# Patient Record
Sex: MALE | Race: WHITE | Employment: OTHER | URBAN - METROPOLITAN AREA
[De-identification: names, ages, dates, MRNs, and addresses within clinical notes are randomized per-mention and may not be internally consistent; named-entity substitution may affect disease eponyms.]

---

## 2017-04-07 ENCOUNTER — HOSPITAL ENCOUNTER (OUTPATIENT)
Facility: CLINIC | Age: 72
End: 2017-04-07
Attending: OPHTHALMOLOGY | Admitting: OPHTHALMOLOGY
Payer: MEDICARE

## 2017-04-18 RX ORDER — ATENOLOL AND CHLORTHALIDONE TABLET 100; 25 MG/1; MG/1
1 TABLET ORAL EVERY EVENING
COMMUNITY
End: 2020-01-22

## 2017-04-19 ENCOUNTER — SURGERY (OUTPATIENT)
Age: 72
End: 2017-04-19

## 2017-04-19 ENCOUNTER — APPOINTMENT (OUTPATIENT)
Dept: ADMISSION | Facility: CLINIC | Age: 72
End: 2017-04-19
Attending: OPHTHALMOLOGY
Payer: COMMERCIAL

## 2017-04-19 ENCOUNTER — HOSPITAL ENCOUNTER (OUTPATIENT)
Facility: CLINIC | Age: 72
Discharge: HOME | End: 2017-04-19
Attending: OPHTHALMOLOGY | Admitting: OPHTHALMOLOGY
Payer: MEDICARE

## 2017-04-19 ENCOUNTER — ANESTHESIA (OUTPATIENT)
Dept: SURGERY | Facility: CLINIC | Age: 72
End: 2017-04-19
Payer: MEDICARE

## 2017-04-19 ENCOUNTER — ANESTHESIA EVENT (OUTPATIENT)
Dept: SURGERY | Facility: CLINIC | Age: 72
End: 2017-04-19
Payer: MEDICARE

## 2017-04-19 VITALS
TEMPERATURE: 97.1 F | OXYGEN SATURATION: 97 % | WEIGHT: 221 LBS | DIASTOLIC BLOOD PRESSURE: 95 MMHG | SYSTOLIC BLOOD PRESSURE: 139 MMHG | RESPIRATION RATE: 16 BRPM | BODY MASS INDEX: 32.73 KG/M2 | HEIGHT: 69 IN

## 2017-04-19 LAB — GLUCOSE BLDC GLUCOMTR-MCNC: 116 MG/DL (ref 70–99)

## 2017-04-19 PROCEDURE — 25000125 ZZHC RX 250: Performed by: OPHTHALMOLOGY

## 2017-04-19 PROCEDURE — 36000135 ZZH KERATOTOMY ARCUATE W FEMTOSECOND LASER/IMAGING FOR ATIOL: Performed by: OPHTHALMOLOGY

## 2017-04-19 PROCEDURE — 25000128 H RX IP 250 OP 636: Performed by: NURSE ANESTHETIST, CERTIFIED REGISTERED

## 2017-04-19 PROCEDURE — 25000125 ZZHC RX 250: Performed by: NURSE ANESTHETIST, CERTIFIED REGISTERED

## 2017-04-19 PROCEDURE — 25000128 H RX IP 250 OP 636: Performed by: OPHTHALMOLOGY

## 2017-04-19 PROCEDURE — 36000101 ZZH EYE SURGERY LEVEL 3 1ST 30 MIN: Performed by: OPHTHALMOLOGY

## 2017-04-19 PROCEDURE — 71000028 ZZH EYE RECOVERY PHASE 2 EACH 15 MINS: Performed by: OPHTHALMOLOGY

## 2017-04-19 PROCEDURE — 25800025 ZZH RX 258: Performed by: ANESTHESIOLOGY

## 2017-04-19 PROCEDURE — 40000170 ZZH STATISTIC PRE-PROCEDURE ASSESSMENT II: Performed by: OPHTHALMOLOGY

## 2017-04-19 PROCEDURE — 27210794 ZZH OR GENERAL SUPPLY STERILE: Performed by: OPHTHALMOLOGY

## 2017-04-19 PROCEDURE — 37000008 ZZH ANESTHESIA TECHNICAL FEE, 1ST 30 MIN: Performed by: OPHTHALMOLOGY

## 2017-04-19 PROCEDURE — 25000132 ZZH RX MED GY IP 250 OP 250 PS 637: Mod: GY | Performed by: OPHTHALMOLOGY

## 2017-04-19 PROCEDURE — V2632 POST CHMBR INTRAOCULAR LENS: HCPCS | Performed by: OPHTHALMOLOGY

## 2017-04-19 PROCEDURE — 82962 GLUCOSE BLOOD TEST: CPT

## 2017-04-19 DEVICE — EYE IMP IOL AMO PCL TECNIS ZCB00 22.0: Type: IMPLANTABLE DEVICE | Site: EYE | Status: FUNCTIONAL

## 2017-04-19 RX ORDER — PROPARACAINE HYDROCHLORIDE 5 MG/ML
1 SOLUTION/ DROPS OPHTHALMIC ONCE
Status: COMPLETED | OUTPATIENT
Start: 2017-04-19 | End: 2017-04-19

## 2017-04-19 RX ORDER — ONDANSETRON 2 MG/ML
INJECTION INTRAMUSCULAR; INTRAVENOUS PRN
Status: DISCONTINUED | OUTPATIENT
Start: 2017-04-19 | End: 2017-04-19

## 2017-04-19 RX ORDER — LIDOCAINE HYDROCHLORIDE 10 MG/ML
INJECTION, SOLUTION EPIDURAL; INFILTRATION; INTRACAUDAL; PERINEURAL PRN
Status: DISCONTINUED | OUTPATIENT
Start: 2017-04-19 | End: 2017-04-19 | Stop reason: HOSPADM

## 2017-04-19 RX ORDER — BALANCED SALT SOLUTION 6.4; .75; .48; .3; 3.9; 1.7 MG/ML; MG/ML; MG/ML; MG/ML; MG/ML; MG/ML
SOLUTION OPHTHALMIC PRN
Status: DISCONTINUED | OUTPATIENT
Start: 2017-04-19 | End: 2017-04-19 | Stop reason: HOSPADM

## 2017-04-19 RX ORDER — PHENYLEPHRINE HYDROCHLORIDE 25 MG/ML
1 SOLUTION/ DROPS OPHTHALMIC
Status: COMPLETED | OUTPATIENT
Start: 2017-04-19 | End: 2017-04-19

## 2017-04-19 RX ORDER — MOXIFLOXACIN 5 MG/ML
SOLUTION/ DROPS OPHTHALMIC PRN
Status: DISCONTINUED | OUTPATIENT
Start: 2017-04-19 | End: 2017-04-19 | Stop reason: HOSPADM

## 2017-04-19 RX ORDER — TROPICAMIDE 10 MG/ML
1 SOLUTION/ DROPS OPHTHALMIC
Status: COMPLETED | OUTPATIENT
Start: 2017-04-19 | End: 2017-04-19

## 2017-04-19 RX ORDER — PREDNISOLONE ACETATE 1 %
SUSPENSION, DROPS(FINAL DOSAGE FORM)(ML) OPHTHALMIC (EYE) PRN
Status: DISCONTINUED | OUTPATIENT
Start: 2017-04-19 | End: 2017-04-19 | Stop reason: HOSPADM

## 2017-04-19 RX ORDER — SODIUM CHLORIDE, SODIUM LACTATE, POTASSIUM CHLORIDE, CALCIUM CHLORIDE 600; 310; 30; 20 MG/100ML; MG/100ML; MG/100ML; MG/100ML
500 INJECTION, SOLUTION INTRAVENOUS CONTINUOUS
Status: DISCONTINUED | OUTPATIENT
Start: 2017-04-19 | End: 2017-04-19 | Stop reason: HOSPADM

## 2017-04-19 RX ORDER — PROPARACAINE HYDROCHLORIDE 5 MG/ML
SOLUTION/ DROPS OPHTHALMIC PRN
Status: DISCONTINUED | OUTPATIENT
Start: 2017-04-19 | End: 2017-04-19 | Stop reason: HOSPADM

## 2017-04-19 RX ORDER — DICLOFENAC SODIUM 1 MG/ML
1 SOLUTION/ DROPS OPHTHALMIC
Status: COMPLETED | OUTPATIENT
Start: 2017-04-19 | End: 2017-04-19

## 2017-04-19 RX ORDER — LIDOCAINE 40 MG/G
CREAM TOPICAL
Status: DISCONTINUED | OUTPATIENT
Start: 2017-04-19 | End: 2017-04-19 | Stop reason: HOSPADM

## 2017-04-19 RX ORDER — MOXIFLOXACIN 5 MG/ML
1 SOLUTION/ DROPS OPHTHALMIC
Status: COMPLETED | OUTPATIENT
Start: 2017-04-19 | End: 2017-04-19

## 2017-04-19 RX ADMIN — ONDANSETRON 4 MG: 2 INJECTION INTRAMUSCULAR; INTRAVENOUS at 08:06

## 2017-04-19 RX ADMIN — MOXIFLOXACIN HYDROCHLORIDE 1 DROP: 5 SOLUTION/ DROPS OPHTHALMIC at 07:09

## 2017-04-19 RX ADMIN — EPINEPHRINE 500 ML: 1 INJECTION, SOLUTION, CONCENTRATE INTRAVENOUS at 08:10

## 2017-04-19 RX ADMIN — PHENYLEPHRINE HYDROCHLORIDE 1 DROP: 2.5 SOLUTION/ DROPS OPHTHALMIC at 07:09

## 2017-04-19 RX ADMIN — DICLOFENAC SODIUM 1 DROP: 1 SOLUTION/ DROPS OPHTHALMIC at 06:50

## 2017-04-19 RX ADMIN — BALANCED SALT SOLUTION 15 ML: 6.4; .75; .48; .3; 3.9; 1.7 SOLUTION OPHTHALMIC at 07:45

## 2017-04-19 RX ADMIN — SODIUM CHLORIDE, SODIUM LACTATE, POTASSIUM CHLORIDE, CALCIUM CHLORIDE: 600; 310; 30; 20 INJECTION, SOLUTION INTRAVENOUS at 07:57

## 2017-04-19 RX ADMIN — PHENYLEPHRINE HYDROCHLORIDE 1 DROP: 2.5 SOLUTION/ DROPS OPHTHALMIC at 06:49

## 2017-04-19 RX ADMIN — MIDAZOLAM HYDROCHLORIDE 1 MG: 1 INJECTION, SOLUTION INTRAMUSCULAR; INTRAVENOUS at 07:57

## 2017-04-19 RX ADMIN — LIDOCAINE HYDROCHLORIDE 1 ML: 10 INJECTION, SOLUTION EPIDURAL; INFILTRATION; INTRACAUDAL; PERINEURAL at 08:11

## 2017-04-19 RX ADMIN — MOXIFLOXACIN HYDROCHLORIDE 1 DROP: 5 SOLUTION/ DROPS OPHTHALMIC at 07:01

## 2017-04-19 RX ADMIN — LIDOCAINE HYDROCHLORIDE 0.5 ML: 35 GEL OPHTHALMIC at 07:52

## 2017-04-19 RX ADMIN — TROPICAMIDE 1 DROP: 10 SOLUTION/ DROPS OPHTHALMIC at 06:50

## 2017-04-19 RX ADMIN — Medication 1 ML: at 08:10

## 2017-04-19 RX ADMIN — PROPARACAINE HYDROCHLORIDE 2 DROP: 5 SOLUTION/ DROPS OPHTHALMIC at 07:45

## 2017-04-19 RX ADMIN — TROPICAMIDE 1 DROP: 10 SOLUTION/ DROPS OPHTHALMIC at 07:01

## 2017-04-19 RX ADMIN — PHENYLEPHRINE HYDROCHLORIDE 1 DROP: 2.5 SOLUTION/ DROPS OPHTHALMIC at 07:01

## 2017-04-19 RX ADMIN — MIDAZOLAM HYDROCHLORIDE 1 MG: 1 INJECTION, SOLUTION INTRAMUSCULAR; INTRAVENOUS at 08:07

## 2017-04-19 RX ADMIN — MOXIFLOXACIN HYDROCHLORIDE 1 DROP: 5 SOLUTION/ DROPS OPHTHALMIC at 06:50

## 2017-04-19 RX ADMIN — DICLOFENAC SODIUM 1 DROP: 1 SOLUTION/ DROPS OPHTHALMIC at 07:01

## 2017-04-19 RX ADMIN — MOXIFLOXACIN HYDROCHLORIDE 1 DROP: 5 SOLUTION/ DROPS OPHTHALMIC at 08:11

## 2017-04-19 RX ADMIN — PROPARACAINE HYDROCHLORIDE 1 DROP: 5 SOLUTION/ DROPS OPHTHALMIC at 06:50

## 2017-04-19 RX ADMIN — DICLOFENAC SODIUM 1 DROP: 1 SOLUTION/ DROPS OPHTHALMIC at 07:09

## 2017-04-19 RX ADMIN — TROPICAMIDE 1 DROP: 10 SOLUTION/ DROPS OPHTHALMIC at 07:09

## 2017-04-19 RX ADMIN — BALANCED SALT SOLUTION 15 ML: 6.4; .75; .48; .3; 3.9; 1.7 SOLUTION OPHTHALMIC at 08:10

## 2017-04-19 RX ADMIN — Medication 2 DROP: at 08:11

## 2017-04-19 RX ADMIN — LIDOCAINE HYDROCHLORIDE 0.5 ML: 35 GEL OPHTHALMIC at 08:10

## 2017-04-19 RX ADMIN — DEXMEDETOMIDINE 8 MCG: 100 INJECTION, SOLUTION, CONCENTRATE INTRAVENOUS at 08:00

## 2017-04-19 ASSESSMENT — ENCOUNTER SYMPTOMS
SEIZURES: 0
ORTHOPNEA: 0

## 2017-04-19 NOTE — ANESTHESIA CARE TRANSFER NOTE
Patient: Ramon Winter    Procedure(s):   COMPLEX FEMTOLASER ASSISTED LEFT EYE PHACOEMULSIFICATION CLEAR CORNEA WITH STANDARD INTRAOCULAR LENS IMPLANT    - Wound Class: I-Clean    Diagnosis: CATARACT  Diagnosis Additional Information: No value filed.    Anesthesia Type:   MAC     Note:  Airway :Room Air  Patient transferred to:PACU  Comments: To recovery, vss      Vitals: (Last set prior to Anesthesia Care Transfer)    CRNA VITALS  4/19/2017 0749 - 4/19/2017 0823      4/19/2017             NIBP: 116/71    NIBP Mean: 89                Electronically Signed By: JINNY Banks CRNA  April 19, 2017  8:23 AM

## 2017-04-19 NOTE — IP AVS SNAPSHOT
MRN:[id number]                      After Visit Summary   4/19/2017    Ramon Winter    MRN: [id number]           Thank you!     Thank you for choosing [facility] for your care. Our goal is always to provide you with excellent care. Hearing back from our patients is one way we can continue to improve our services. Please take a few minutes to complete the written survey that you may receive in the mail after you visit with us. Thank you!        Patient Information     Date Of Birth          1945        About your hospital stay     You were admitted on:  April 19, 2017 You last received care in the:  [facility]    You were discharged on:  April 19, 2017       Who to Call     For medical emergencies, please call 911.  For non-urgent questions about your medical care, please call your primary care provider or clinic, [phone]  For questions related to your surgery, please call your surgery clinic        Attending Provider     Provider Riley Patterson MD Ophthalmology       Primary Care Provider Office Phone # Fax #    Joshua Gonsalez [phone]       [facility] [address] [facility] [address]        Further instructions from your care team       Dr. Riley Whatley  [facility]  [phone]  Post Operative Cataract Instructions    If you have a clear eye shield on, you should wear the eye shield or glasses to protect the eye on the day of surgery and begin eye drops today as directed.    The clear eye shield should be worn overnight and brought to the clinic at your post op visit.    Do not rub the operated eye.    Light sensitivity may be noticed. Sunglasses may be worn for comfort.    Some discomfort and irritation may be noticed. Acetaminophen (Tylenol) or Ibuprofen (Advil) may be taken for discomfort. If pain persists please call Dr. Whatley's office.    Keep the operated eye dry. You may wash your  "hair, bathe or shower, but keep the operated eye closed while doing so. No swimming pools, hot tubs, saunas, etc. for 10 days.    Use medication exactly as prescribed by your doctor. You may restart your regular home medications.     Bring all eye medications with you to the clinic on your first post operative visit.    Call Dr. Whatley's office if any of the following should occur:      Any sudden vision changes    Nausea or severe headache    Increase in pain not controlled      Or signs of infection (pus, increasing redness or tenderness)      [facility] Anesthesia [facility] Care [facility] Discharge  Instructions  Anesthesia (Eye Care Center)   Adult Discharge Instructions    For 24 hours after surgery    1. Get plenty of rest.  Make arrangements to have a responsible adult stay with you for at least 6 hours after you leave the hospital.  2. Do not drive or use heavy equipment for 24 hours.    3. Do not drink alcohol for 24 hours.  4. Do not sign legal documents or make important decisions for 24 hours.  5. Avoid strenuous or risky activities. You may feel lightheaded.  If so, sit for a few minutes before standing.  Have someone help you get up.   6. Conscious sedation patients may resume a regular diet..  7. Any questions of medical nature, call your physician.      11/8/16    Pending Results     No orders found from 4/17/2017 to 4/20/2017.            Admission Information     Date & Time Provider Department Dept. Phone    4/19/2017 Riley Whatley MD [facility] [phone]      Your Vitals Were     Blood Pressure Temperature Respirations Height Weight Pulse Oximetry    142/93 97.1  F (36.2  C) (Temporal) 16 1.753 m (5' 9\") 100.2 kg (221 lb) 99%    BMI (Body Mass Index)                   32.64 kg/m2           MyChart Information     [employer] lets you send messages to your doctor, view your test results, renew your prescriptions, schedule appointments and more. To sign up, go to " "[email or web address] . Click on \"Log in\" on the left side of the screen, which will take you to the Welcome page. Then click on \"Sign up Now\" on the right side of the page.     You will be asked to enter the access code listed below, as well as some personal information. Please follow the directions to create your username and password.     Your access code is: [email or web address]  Expires: 2017  8:27 AM     Your access code will  in 90 days. If you need help or a new code, please call your [facility] clinic or [phone].        Care EveryWhere ID     This is your Care EveryWhere ID. This could be used by other organizations to access your [facility] medical records  [id number]           Review of your medicines      UNREVIEWED medicines. Ask your doctor about these medicines        Dose / Directions    ALLOPURINOL PO        Refills:  0       ASPIRIN PO        Dose:  81 mg   Take 81 mg by mouth   Refills:  0       atenolol-chlorthalidone 100-25 MG per tablet   Commonly known as:  TENORETIC        Dose:  1 tablet   Take 1 tablet by mouth daily   Refills:  0       CRESTOR PO        Dose:  40 mg   Take 40 mg by mouth daily   Refills:  0       GLIPIZIDE PO        Dose:  5 mg   Take 5 mg by mouth every morning (before breakfast)   Refills:  0       LISINOPRIL PO        Dose:  10 mg   Take 10 mg by mouth daily   Refills:  0       METFORMIN HCL PO        Dose:  1000 mg   Take 1,000 mg by mouth 2 times daily (with meals)   Refills:  0       OMEPRAZOLE PO        Dose:  20 mg   Take 20 mg by mouth   Refills:  0       VENLAFAXINE HCL PO        Dose:  75 mg   Take 75 mg by mouth 2 times daily   Refills:  0                Protect others around you: Learn how to safely use, store and throw away your medicines at www.disposemymeds.org.             Medication List: This is a list of all your medications and when to take them. Check marks below indicate your daily home schedule. Keep this list as a reference.    "   Medications           Morning Afternoon Evening Bedtime As Needed    ALLOPURINOL PO                                ASPIRIN PO   Take 81 mg by mouth                                atenolol-chlorthalidone 100-25 MG per tablet   Commonly known as:  TENORETIC   Take 1 tablet by mouth daily                                CRESTOR PO   Take 40 mg by mouth daily                                GLIPIZIDE PO   Take 5 mg by mouth every morning (before breakfast)                                LISINOPRIL PO   Take 10 mg by mouth daily                                METFORMIN HCL PO   Take 1,000 mg by mouth 2 times daily (with meals)                                OMEPRAZOLE PO   Take 20 mg by mouth                                VENLAFAXINE HCL PO   Take 75 mg by mouth 2 times daily

## 2017-04-19 NOTE — ANESTHESIA POSTPROCEDURE EVALUATION
Patient: Ramon Winter    Procedure(s):   COMPLEX FEMTOLASER ASSISTED LEFT EYE PHACOEMULSIFICATION CLEAR CORNEA WITH STANDARD INTRAOCULAR LENS IMPLANT    - Wound Class: I-Clean    Diagnosis:CATARACT  Diagnosis Additional Information: No value filed.    Anesthesia Type:  MAC    Note:  Anesthesia Post Evaluation    Patient location during evaluation: PACU  Patient participation: Able to fully participate in evaluation  Level of consciousness: awake  Pain management: adequate  Airway patency: patent  Cardiovascular status: acceptable  Respiratory status: acceptable  Hydration status: acceptable  PONV: none     Anesthetic complications: None          Last vitals:  Vitals:    04/19/17 0656 04/19/17 0821 04/19/17 0832   BP: (!) 142/93 139/83 (!) 139/95   Resp: 16 16 16   Temp: 36.2  C (97.1  F)     SpO2: 99% 97% 97%         Electronically Signed By: Kirstin Bolivar  April 19, 2017  11:13 AM

## 2017-04-19 NOTE — IP AVS SNAPSHOT
[facility]    [address]    [place] [address]    Phone:  [phone]    Fax:  [phone]                                       After Visit Summary   4/19/2017    Ramon Winter    MRN: [id number]           After Visit Summary Signature Page     I have received my discharge instructions, and my questions have been answered. I have discussed any challenges I see with this plan with the nurse or doctor.    ..........................................................................................................................................  Patient/Patient Representative Signature      ..........................................................................................................................................  Patient Representative Print Name and Relationship to Patient    ..................................................               ................................................  Date                                            Time    ..........................................................................................................................................  Reviewed by Signature/Title    ...................................................              ..............................................  Date                                                            Time

## 2017-04-19 NOTE — OP NOTE
Pre-Operative Diagnosis: 1. Visually Significant Cataract, left eye           2. Corneal astigmatism, left eye           3. History of flomax use.    Post-Operative Diagnosis: Same    Procedure:    1. Complicated Femtosecond laser assisted Cataract Extraction with Intraocular Lens placement, left eye.   2. Femtosecond laser assisted Arcuate keratotomy X 1, left eye              3. 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    Anesthesia: MAC, Topical.    Estimated blood loss: None.    Implant: DIOR model [id number] 22.0 Diopter lens.    Complication: None.    Description of Procedure:  After informed consent was obtained, and operative site was marked, the patient was brought to the laser suite where femtosecond laser-assisted capsulotomy, wound creation, arcuate incision as determined by the DIOR LRI calculator, and lens fragmentation were performed without complication.  The patient was then was brought to the operative room and prepped and draped in the standard ophthalmic fashion.  A paracentesis was opened.  Lidocaine 1% non preserved was injected into the anterior chamber.  The anterior chamber was filled with viscoelastic. The beveled incision was opened bluntly.  The pupil at this point was quite small, and I decided to use a 6.25 mm malyugin ring.  This was carefully inserted after inflating the sulcus with viscoelastic without difficulty.  A forceps was used to remove the capsulorhexis lenticule without attachments.  The lens was hydrodissected from the capsular bag until it rotated freely.  The lens was phacoemulsified in the stop-and-chop method.  The cortex was aspirated out of the eye.  The above-named lens was injected into the capsular bag after it was re-inflated with viscoelastic.  The malyugin ring was carefully removed without complication. The viscoelastic was aspirated out of the eye.  The wounds were hydrated and found to be water-tight.  Moxifloxacin was instilled  into the anterior chamber.  A drop of gatifloxacin 0.5%, prednisolone acetate 1%, and Ketorolac were placed in the eye.  The eye was shielded.  The arcuate incision was not opened and will be as necessary in the clinic postoperatively depending on astigmatism    The patient tolerated the procedure well, and left the operative in stable and satisfactory condition, and will follow up tomorrow in the eye clinic.

## 2017-04-19 NOTE — DISCHARGE INSTRUCTIONS
Dr. Riley Whatley  [facility]  [phone]  Post Operative Cataract Instructions    If you have a clear eye shield on, you should wear the eye shield or glasses to protect the eye on the day of surgery and begin eye drops today as directed.    The clear eye shield should be worn overnight and brought to the clinic at your post op visit.    Do not rub the operated eye.    Light sensitivity may be noticed. Sunglasses may be worn for comfort.    Some discomfort and irritation may be noticed. Acetaminophen (Tylenol) or Ibuprofen (Advil) may be taken for discomfort. If pain persists please call Dr. Whatley's office.    Keep the operated eye dry. You may wash your hair, bathe or shower, but keep the operated eye closed while doing so. No swimming pools, hot tubs, saunas, etc. for 10 days.    Use medication exactly as prescribed by your doctor. You may restart your regular home medications.     Bring all eye medications with you to the clinic on your first post operative visit.    Call Dr. Whatley's office if any of the following should occur:      Any sudden vision changes    Nausea or severe headache    Increase in pain not controlled      Or signs of infection (pus, increasing redness or tenderness)      [facility] Anesthesia [facility] Care Center Discharge  Instructions  Anesthesia (Eye Care Center)   Adult Discharge Instructions    For 24 hours after surgery    1. Get plenty of rest.  Make arrangements to have a responsible adult stay with you for at least 6 hours after you leave the hospital.  2. Do not drive or use heavy equipment for 24 hours.    3. Do not drink alcohol for 24 hours.  4. Do not sign legal documents or make important decisions for 24 hours.  5. Avoid strenuous or risky activities. You may feel lightheaded.  If so, sit for a few minutes before standing.  Have someone help you get up.   6. Conscious sedation patients may resume a regular diet..  7. Any questions of medical nature, call  your physician.      11/8/16

## 2017-04-19 NOTE — ANESTHESIA PREPROCEDURE EVALUATION
"Procedure: Procedure(s):  PHACOEMULSIFICATION CLEAR CORNEA WITH STANDARD INTRAOCULAR LENS IMPLANT  Preop diagnosis: CATARACT  Allergies   Allergen Reactions     Lisinopril      Cough       Patient Active Problem List   Diagnosis     Rotator cuff (capsule) sprain     Pain in joint, shoulder region     Other postprocedural status(V45.89)     Past Medical History:   Diagnosis Date     Diabetes (H)     dm 2     GERD (gastroesophageal reflux disease)      High cholesterol      Hyperlipidemia      Hypertension      Osteoarthritis      Other chronic pain     In knees and back     Past Surgical History:   Procedure Laterality Date     ENT SURGERY      tonsilectomy     ORTHOPEDIC SURGERY      right knee     ORTHOPEDIC SURGERY      right shoulder       No current facility-administered medications on file prior to encounter.   Current Outpatient Prescriptions on File Prior to Encounter:  METFORMIN HCL PO Take 1,000 mg by mouth 2 times daily (with meals)    LISINOPRIL PO Take 10 mg by mouth daily    ALLOPURINOL PO    Rosuvastatin Calcium (CRESTOR PO) Take 40 mg by mouth daily    GLIPIZIDE PO Take 5 mg by mouth every morning (before breakfast)    OMEPRAZOLE PO Take 20 mg by mouth    ASPIRIN PO Take 81 mg by mouth   VENLAFAXINE HCL PO Take 75 mg by mouth 2 times daily      BP (!) 142/93  Temp 36.2  C (97.1  F) (Temporal)  Resp 16  Ht 1.753 m (5' 9\")  Wt 100.2 kg (221 lb)  SpO2 99%  BMI 32.64 kg/m2    FS glucose 116  EKG - SB, nl axis     Anesthesia Evaluation     . Pt has had prior anesthetic.     No history of anesthetic complications          ROS/MED HX    ENT/Pulmonary:      (-) sleep apnea and recent URI   Neurologic: Comment: Restless leg syndrome     (-) seizures, CVA and migraines   Cardiovascular:     (+) Dyslipidemia, hypertension----. : . . . :. .      (-) CHF and orthopnea/PND   METS/Exercise Tolerance:     Hematologic:  - neg hematologic  ROS       Musculoskeletal: Comment: gout  (+) arthritis, , , -     "   GI/Hepatic:     (+) GERD       Renal/Genitourinary:     (+) chronic renal disease, type: CRI, Nephrolithiasis , BPH,       Endo:     (+) type II DM .      Psychiatric:  - neg psychiatric ROS       Infectious Disease:         Malignancy:         Other:    (+) H/O Chronic Pain,                   Physical Exam  Normal systems: cardiovascular, pulmonary and dental    Airway   Mallampati: II  TM distance: >3 FB  Neck ROM: full    Dental     Cardiovascular   Rhythm and rate: regular and normal      Pulmonary    breath sounds clear to auscultation                    Anesthesia Plan      History & Physical Review  History and physical reviewed and following examination; no interval change.    ASA Status:  2 .    NPO Status:  > 8 hours    Plan for MAC Reason for MAC:  Procedure to face, neck, head or breast         Postoperative Care  Postoperative pain management:  IV analgesics.      Consents  Anesthetic plan, risks, benefits and alternatives discussed with:  Patient..                          .

## 2017-08-02 ENCOUNTER — HOSPITAL ENCOUNTER (OUTPATIENT)
Facility: CLINIC | Age: 72
Discharge: HOME | End: 2017-08-02
Attending: INTERNAL MEDICINE | Admitting: INTERNAL MEDICINE
Payer: MEDICARE

## 2017-08-02 VITALS
WEIGHT: 221 LBS | OXYGEN SATURATION: 96 % | SYSTOLIC BLOOD PRESSURE: 114 MMHG | HEIGHT: 69 IN | RESPIRATION RATE: 12 BRPM | DIASTOLIC BLOOD PRESSURE: 70 MMHG | BODY MASS INDEX: 32.73 KG/M2

## 2017-08-02 LAB — UPPER GI ENDOSCOPY: NORMAL

## 2017-08-02 PROCEDURE — 82962 GLUCOSE BLOOD TEST: CPT

## 2017-08-02 PROCEDURE — 88305 TISSUE EXAM BY PATHOLOGIST: CPT | Performed by: INTERNAL MEDICINE

## 2017-08-02 PROCEDURE — 25000125 ZZHC RX 250: Performed by: INTERNAL MEDICINE

## 2017-08-02 PROCEDURE — 88305 TISSUE EXAM BY PATHOLOGIST: CPT | Mod: 26 | Performed by: INTERNAL MEDICINE

## 2017-08-02 PROCEDURE — 88377 M/PHMTRC ALYS ISHQUANT/SEMIQ: CPT

## 2017-08-02 PROCEDURE — 00000159 ZZHCL STATISTIC H-SEND OUTS PREP: Performed by: INTERNAL MEDICINE

## 2017-08-02 PROCEDURE — 43239 EGD BIOPSY SINGLE/MULTIPLE: CPT | Performed by: INTERNAL MEDICINE

## 2017-08-02 PROCEDURE — G0500 MOD SEDAT ENDO SERVICE >5YRS: HCPCS | Performed by: INTERNAL MEDICINE

## 2017-08-02 PROCEDURE — 25000128 H RX IP 250 OP 636: Performed by: INTERNAL MEDICINE

## 2017-08-02 PROCEDURE — 00000158 ZZHCL STATISTIC H-FISH PROCESS B/S: Performed by: INTERNAL MEDICINE

## 2017-08-02 RX ORDER — FENTANYL CITRATE 50 UG/ML
INJECTION, SOLUTION INTRAMUSCULAR; INTRAVENOUS PRN
Status: DISCONTINUED | OUTPATIENT
Start: 2017-08-02 | End: 2017-08-02 | Stop reason: HOSPADM

## 2017-08-02 RX ORDER — ONDANSETRON 4 MG/1
4 TABLET, ORALLY DISINTEGRATING ORAL EVERY 6 HOURS PRN
Status: DISCONTINUED | OUTPATIENT
Start: 2017-08-02 | End: 2017-08-02 | Stop reason: HOSPADM

## 2017-08-02 RX ORDER — LIDOCAINE 40 MG/G
CREAM TOPICAL
Status: DISCONTINUED | OUTPATIENT
Start: 2017-08-02 | End: 2017-08-02 | Stop reason: HOSPADM

## 2017-08-02 RX ORDER — ONDANSETRON 2 MG/ML
4 INJECTION INTRAMUSCULAR; INTRAVENOUS EVERY 6 HOURS PRN
Status: DISCONTINUED | OUTPATIENT
Start: 2017-08-02 | End: 2017-08-02 | Stop reason: HOSPADM

## 2017-08-02 RX ORDER — ONDANSETRON 2 MG/ML
4 INJECTION INTRAMUSCULAR; INTRAVENOUS
Status: DISCONTINUED | OUTPATIENT
Start: 2017-08-02 | End: 2017-08-02 | Stop reason: HOSPADM

## 2017-08-02 RX ORDER — NALOXONE HYDROCHLORIDE 0.4 MG/ML
.1-.4 INJECTION, SOLUTION INTRAMUSCULAR; INTRAVENOUS; SUBCUTANEOUS
Status: DISCONTINUED | OUTPATIENT
Start: 2017-08-02 | End: 2017-08-02 | Stop reason: HOSPADM

## 2017-08-02 RX ORDER — FLUMAZENIL 0.1 MG/ML
0.2 INJECTION, SOLUTION INTRAVENOUS
Status: DISCONTINUED | OUTPATIENT
Start: 2017-08-02 | End: 2017-08-02 | Stop reason: HOSPADM

## 2017-08-02 NOTE — H&P
Pre-Endoscopy History and Physical     Ramon Winter MRN# [id number]   YOB: 1945 Age: 71 year old     Date of Procedure: 8/2/2017  Primary care provider: Joshua Gonsalez  Type of Endoscopy: Gastroscopy with possible biopsy, possible dilation  Reason for Procedure: dysphagia  Type of Anesthesia Anticipated: Conscious Sedation    HPI:    Ramon is a 71 year old male who will be undergoing the above procedure.      A history and physical has been performed. The patient's medications and allergies have been reviewed. The risks and benefits of the procedure and the sedation options and risks were discussed with the patient.  All questions were answered and informed consent was obtained.      He denies a personal or family history of anesthesia complications or bleeding disorders.     Patient Active Problem List   Diagnosis     Rotator cuff (capsule) sprain     Pain in joint, shoulder region     Post-proc states NEC        Past Medical History:   Diagnosis Date     Diabetes (H)     dm 2     GERD (gastroesophageal reflux disease)      High cholesterol      Hyperlipidemia      Hypertension      Osteoarthritis      Other chronic pain     In knees and back        Past Surgical History:   Procedure Laterality Date     ENT SURGERY      tonsilectomy     KERATOTOMY ARCUATE WITH FEMTOSECOND LASER/IMAGING FOR ATIOL Left 4/19/2017    Procedure: KERATOTOMY ARCUATE WITH FEMTOSECOND LASER/IMAGING FOR ATIOL;  LEFT EYE FEMTOSECOND LASER CAPSULOTOMY, LENS FRAGMENTATION, ARCUATE INCISIONS, CATARACT INCISIONS  ;  Surgeon: Riley Whatley MD;  Location: [facility]     ORTHOPEDIC SURGERY      right knee     ORTHOPEDIC SURGERY      right shoulder     PHACOEMULSIFICATION CLEAR CORNEA WITH STANDARD INTRAOCULAR LENS IMPLANT Left 4/19/2017    Procedure: PHACOEMULSIFICATION CLEAR CORNEA WITH STANDARD INTRAOCULAR LENS IMPLANT;   COMPLEX FEMTOLASER ASSISTED LEFT EYE PHACOEMULSIFICATION CLEAR CORNEA WITH STANDARD INTRAOCULAR LENS  "IMPLANT   ;  Surgeon: Riley Whatley MD;  Location[facility]       Social History   Substance Use Topics     Smoking status: Former Smoker     Types: Cigarettes     Smokeless tobacco: Never Used     Alcohol use No       History reviewed. No pertinent family history.    Prior to Admission medications    Medication Sig Start Date End Date Taking? Authorizing Provider   atenolol-chlorthalidone (TENORETIC) 100-25 MG per tablet Take 1 tablet by mouth daily   Yes Reported, Patient   METFORMIN HCL PO Take 1,000 mg by mouth 2 times daily (with meals)    Yes Reported, Patient   LISINOPRIL PO Take 10 mg by mouth daily    Yes Reported, Patient   ALLOPURINOL PO    Yes Reported, Patient   Rosuvastatin Calcium (CRESTOR PO) Take 40 mg by mouth daily    Yes Reported, Patient   GLIPIZIDE PO Take 5 mg by mouth every morning (before breakfast)    Yes Reported, Patient   VENLAFAXINE HCL PO Take 75 mg by mouth 2 times daily    Yes Reported, Patient   OMEPRAZOLE PO Take 20 mg by mouth    Yes Reported, Patient   ASPIRIN PO Take 81 mg by mouth   Yes Reported, Patient       Allergies   Allergen Reactions     Lisinopril      Cough          REVIEW OF SYSTEMS:   5 point ROS negative except as noted above in HPI, including Gen., Resp., CV, GI &  system review.    PHYSICAL EXAM:   There were no vitals taken for this visit. Estimated body mass index is 32.64 kg/(m^2) as calculated from the following:    Height as of 4/19/17: 1.753 m (5' 9\").    Weight as of 4/19/17: 100.2 kg (221 lb).   GENERAL APPEARANCE: alert, and oriented  MENTAL STATUS: alert  AIRWAY EXAM: Mallampatti Class I (visualization of the soft palate, fauces, uvula, anterior and posterior pillars)  RESP: lungs clear to auscultation - no rales, rhonchi or wheezes  CV: regular rates and rhythm  DIAGNOSTICS:    Not indicated    IMPRESSION   ASA Class 2 - Mild systemic disease    PLAN:   Plan for Gastroscopy with possible biopsy, possible dilation. We discussed the risks, benefits and " alternatives and the patient wished to proceed.    The above has been forwarded to the consulting provider.      Signed Electronically by: Jeffery Keene  August 2, 2017

## 2017-08-02 NOTE — LETTER
Thank you for choosing [facility]. You are scheduled for the following service.   Date:   8-2-17      Procedure: UPPER ENDOSCOPY-EGD  Doctor: Jassi           Arrival Time:  200    *check in at Emergency/Endoscopy desk*  Procedure Time: 230     Location:   [facility]        Endoscopy Department, First Floor (Enter through ER Doors) *         [address] [place], [place] [address]      [phone] or [phone] () to reschedule        PRE-PROCEDURE CHECKLIST    If you have diabetes, ask your regular doctor for diet and medication restrictions.  If you take any antiplatelet or anticoagulant medications (such as Coumadin, Lovenox, Plavix, etc.) and have not already discussed this, please call your primary physician for advice on holding this medication.  If you take Aspirin, you may continue to do so.  If you are or may be pregnant, please discuss the risks and benefits of this procedure with your doctor.  You must arrange for a ride for the day of your exam. If you fail to arrange transportation with a responsible adult, your procedure will need to be cancelled and rescheduled. Taxi, bus and medical transport are not acceptable unless you have a responsible adult that you know & trust with you. Please arrange for this  to be able to pick you up in our department, approximately one hour after your scheduled procedure, if they are not able to stay with you.      Canceling or rescheduling   If you must cancel or reschedule your appointment, please call [phone] as soon as possible.      Upper Endoscopy or Esophagogastroduodenoscopy (EGD) is a test performed to evaluate symptoms of persistent abdominal pain, nausea, vomiting and difficulty swallowing. It may also be used to treat various conditions of the upper gastrointestinal tract, such as bleeding, narrowing or abnormal growths.     What happens during an upper endoscopy?  On the day of your  procedure, plan to spend up to one and a half hour after your arrival at the endoscopy center. The exam itself takes about 5 to 10 minutes.    Before the exam:  - You will change into a gown.   - Your medical history and medication list will be reviewed with you, unless it has already been done over the phone.   - A nurse will insert an intravenous (IV) line into your hand or arm.  - The doctor will talk to you and give you a consent form to sign.    During the exam:  - Medicine will be given through the IV line to help you relax and feel comfortable.   - Your heart rate and oxygen levels will be monitored. If your blood pressure is low, you may be given fluids through the IV line.   - The doctor will insert a flexible, hollow tube, called an endoscope, into your mouth and will advance it slowly through the esophagus, stomach and duodenum (the first part of your small intestine).   - You may have a feeling of pressure or fullness.   - If you have difficulty swallowing, and the doctor finds a narrowing in your esophagus, it may be possible for the area to be expanded-dilated during the exam.   - If abnormal tissue is found, the doctor may remove it through the endoscope (biopsy it) for closer examination. The tissue removal is painless.    After the exam:  - Any tissue samples removed during the exam will be sent to a lab for evaluation. It may take 5 to 7 working days for you to be notified of the results  - The doctor will prepare a full report for the physician who referred you for the upper endoscopy.   - The doctor will talk with you about the initial results of your exam.   - You may feel bloated after the procedure. That is normal and should not last long.   - Your throat may feel sore for a short time.   - Following the exam, you may resume your normal diet. Avoid alcohol until the next day.   - You may resume your regular activities the day after the procedure.   - Medication given during the exam will  prohibit you from driving for the rest of the day.  - A nurse will provide you with complete discharge instructions before you leave the endoscopy center. Be sure to ask the nurse for specific instructions if you take blood thinners such as Aspirin , Coumadin , Lovenox , Plavix , etc.       PREPARATION    To ensure a successful exam, please follow all instructions carefully.      The night before your exam:    STOP eating solid foods at 11:45 pm.     Clear liquids are okay to drink (examples: Gatorade , apple juice, clear broth, etc.).     DO NOT drink red liquids or alcoholic beverages.    The day of your exam:    STOP drinking clear liquids 4 hours before your exam.     You may take your usual medications with 4 oz. of water, but it needs to be at least 4 hours prior to your procedure.    When you leave for the procedure:    Bring a list of all of your current medications, including any allergy or over-the-counter medications, unless you have already reviewed that with an Endoscopy RN over the phone.     Bring a photo ID as well as up-to-date insurance information, such as your insurance card and any referral forms that might be required by your payer.       DIRECTIONS TO THE ENDOSCOPY DEPARTMENT    From the north ([facility])  Take 35W south, exit on [address] . Get into the left hand matt, turn left (east), go one-half mile to [address] and turn left. Go north to the first stoplight, take a right on [address] and follow it to the Emergency entrance.  From the south ([facility])  Take 35N to the 35E split and exit on [address] . On [address] , turn left (west) to [address]. Turn right (north) on [address]. Go north to the first stoplight, take a right on [address] and follow it to the Emergency entrance.  From the east via 35E ([facility])  Take 35E south to [place] [id number] exit. Turn right on [address] . Go west to [address]. Turn right (north) on [address]. Go to the first stoplight, take a right and follow on [address] to the Emergency entrance.  From the east via [address] ([place])  Take [address] to [address]. Turn left (south) on [address] to [address]. Turn left (east) on [address] and follow it to the Emergency entrance.  From the west via [address] ([place], [place])  Take [address] east to [address]. Turn right (south) on [address] to [address]. Turn left (east) on [address] and follow it to the Emergency entrance.

## 2017-08-03 LAB — GLUCOSE BLDC GLUCOMTR-MCNC: 142 MG/DL (ref 70–99)

## 2017-08-12 LAB — COPATH REPORT: NORMAL

## 2017-08-15 LAB — COPATH REPORT: NORMAL

## 2017-08-16 NOTE — H&P (VIEW-ONLY)
Pre-Endoscopy History and Physical     Ramon Winter MRN# [id number]   YOB: 1945 Age: 71 year old     Date of Procedure: 8/2/2017  Primary care provider: Joshua Gonsalez  Type of Endoscopy: Gastroscopy with possible biopsy, possible dilation  Reason for Procedure: dysphagia  Type of Anesthesia Anticipated: Conscious Sedation    HPI:    Ramon is a 71 year old male who will be undergoing the above procedure.      A history and physical has been performed. The patient's medications and allergies have been reviewed. The risks and benefits of the procedure and the sedation options and risks were discussed with the patient.  All questions were answered and informed consent was obtained.      He denies a personal or family history of anesthesia complications or bleeding disorders.     Patient Active Problem List   Diagnosis     Rotator cuff (capsule) sprain     Pain in joint, shoulder region     Post-proc states NEC        Past Medical History:   Diagnosis Date     Diabetes (H)     dm 2     GERD (gastroesophageal reflux disease)      High cholesterol      Hyperlipidemia      Hypertension      Osteoarthritis      Other chronic pain     In knees and back        Past Surgical History:   Procedure Laterality Date     ENT SURGERY      tonsilectomy     KERATOTOMY ARCUATE WITH FEMTOSECOND LASER/IMAGING FOR ATIOL Left 4/19/2017    Procedure: KERATOTOMY ARCUATE WITH FEMTOSECOND LASER/IMAGING FOR ATIOL;  LEFT EYE FEMTOSECOND LASER CAPSULOTOMY, LENS FRAGMENTATION, ARCUATE INCISIONS, CATARACT INCISIONS  ;  Surgeon: Riley Whatley MD;  Location: [facility]     ORTHOPEDIC SURGERY      right knee     ORTHOPEDIC SURGERY      right shoulder     PHACOEMULSIFICATION CLEAR CORNEA WITH STANDARD INTRAOCULAR LENS IMPLANT Left 4/19/2017    Procedure: PHACOEMULSIFICATION CLEAR CORNEA WITH STANDARD INTRAOCULAR LENS IMPLANT;   COMPLEX FEMTOLASER ASSISTED LEFT EYE PHACOEMULSIFICATION CLEAR CORNEA WITH STANDARD INTRAOCULAR LENS  "IMPLANT   ;  Surgeon: Riley Whatley MD;  Location: [facility]       Social History   Substance Use Topics     Smoking status: Former Smoker     Types: Cigarettes     Smokeless tobacco: Never Used     Alcohol use No       History reviewed. No pertinent family history.    Prior to Admission medications    Medication Sig Start Date End Date Taking? Authorizing Provider   atenolol-chlorthalidone (TENORETIC) 100-25 MG per tablet Take 1 tablet by mouth daily   Yes Reported, Patient   METFORMIN HCL PO Take 1,000 mg by mouth 2 times daily (with meals)    Yes Reported, Patient   LISINOPRIL PO Take 10 mg by mouth daily    Yes Reported, Patient   ALLOPURINOL PO    Yes Reported, Patient   Rosuvastatin Calcium (CRESTOR PO) Take 40 mg by mouth daily    Yes Reported, Patient   GLIPIZIDE PO Take 5 mg by mouth every morning (before breakfast)    Yes Reported, Patient   VENLAFAXINE HCL PO Take 75 mg by mouth 2 times daily    Yes Reported, Patient   OMEPRAZOLE PO Take 20 mg by mouth    Yes Reported, Patient   ASPIRIN PO Take 81 mg by mouth   Yes Reported, Patient       Allergies   Allergen Reactions     Lisinopril      Cough          REVIEW OF SYSTEMS:   5 point ROS negative except as noted above in HPI, including Gen., Resp., CV, GI &  system review.    PHYSICAL EXAM:   There were no vitals taken for this visit. Estimated body mass index is 32.64 kg/(m^2) as calculated from the following:    Height as of 4/19/17: 1.753 m (5' 9\").    Weight as of 4/19/17: 100.2 kg (221 lb).   GENERAL APPEARANCE: alert, and oriented  MENTAL STATUS: alert  AIRWAY EXAM: Mallampatti Class I (visualization of the soft palate, fauces, uvula, anterior and posterior pillars)  RESP: lungs clear to auscultation - no rales, rhonchi or wheezes  CV: regular rates and rhythm  DIAGNOSTICS:    Not indicated    IMPRESSION   ASA Class 2 - Mild systemic disease    PLAN:   Plan for Gastroscopy with possible biopsy, possible dilation. We discussed the risks, benefits and " alternatives and the patient wished to proceed.    The above has been forwarded to the consulting provider.      Signed Electronically by: Jeffery Keene  August 2, 2017

## 2017-08-17 ENCOUNTER — ANESTHESIA EVENT (OUTPATIENT)
Dept: SURGERY | Facility: CLINIC | Age: 72
End: 2017-08-17
Payer: MEDICARE

## 2017-08-17 ENCOUNTER — ANESTHESIA (OUTPATIENT)
Dept: SURGERY | Facility: CLINIC | Age: 72
End: 2017-08-17
Payer: MEDICARE

## 2017-08-17 ENCOUNTER — HOSPITAL ENCOUNTER (OUTPATIENT)
Facility: CLINIC | Age: 72
Discharge: HOME | End: 2017-08-17
Attending: INTERNAL MEDICINE | Admitting: INTERNAL MEDICINE
Payer: MEDICARE

## 2017-08-17 ENCOUNTER — SURGERY (OUTPATIENT)
Age: 72
End: 2017-08-17

## 2017-08-17 VITALS
TEMPERATURE: 97 F | OXYGEN SATURATION: 96 % | DIASTOLIC BLOOD PRESSURE: 85 MMHG | SYSTOLIC BLOOD PRESSURE: 118 MMHG | WEIGHT: 210.2 LBS | RESPIRATION RATE: 12 BRPM | BODY MASS INDEX: 31.04 KG/M2

## 2017-08-17 LAB
CREAT SERPL-MCNC: 1.86 MG/DL (ref 0.66–1.25)
GFR SERPL CREATININE-BSD FRML MDRD: 36 ML/MIN/1.7M2
GLUCOSE BLDC GLUCOMTR-MCNC: 115 MG/DL (ref 70–99)
GLUCOSE BLDC GLUCOMTR-MCNC: 132 MG/DL (ref 70–99)
GLUCOSE SERPL-MCNC: 142 MG/DL (ref 70–99)
POTASSIUM SERPL-SCNC: 4.3 MMOL/L (ref 3.4–5.3)
UPPER EUS: NORMAL

## 2017-08-17 PROCEDURE — 82947 ASSAY GLUCOSE BLOOD QUANT: CPT | Mod: 91 | Performed by: ANESTHESIOLOGY

## 2017-08-17 PROCEDURE — 88305 TISSUE EXAM BY PATHOLOGIST: CPT | Mod: 26 | Performed by: INTERNAL MEDICINE

## 2017-08-17 PROCEDURE — 37000009 ZZH ANESTHESIA TECHNICAL FEE, EACH ADDTL 15 MIN: Performed by: INTERNAL MEDICINE

## 2017-08-17 PROCEDURE — 71000027 ZZH RECOVERY PHASE 2 EACH 15 MINS: Performed by: INTERNAL MEDICINE

## 2017-08-17 PROCEDURE — 36000056 ZZH SURGERY LEVEL 3 1ST 30 MIN: Performed by: INTERNAL MEDICINE

## 2017-08-17 PROCEDURE — 88173 CYTOPATH EVAL FNA REPORT: CPT | Performed by: INTERNAL MEDICINE

## 2017-08-17 PROCEDURE — 37000008 ZZH ANESTHESIA TECHNICAL FEE, 1ST 30 MIN: Performed by: INTERNAL MEDICINE

## 2017-08-17 PROCEDURE — 88172 CYTP DX EVAL FNA 1ST EA SITE: CPT | Mod: 26 | Performed by: INTERNAL MEDICINE

## 2017-08-17 PROCEDURE — 84132 ASSAY OF SERUM POTASSIUM: CPT | Performed by: ANESTHESIOLOGY

## 2017-08-17 PROCEDURE — 25000128 H RX IP 250 OP 636: Performed by: ANESTHESIOLOGY

## 2017-08-17 PROCEDURE — 40000306 ZZH STATISTIC PRE PROC ASSESS II: Performed by: INTERNAL MEDICINE

## 2017-08-17 PROCEDURE — 82962 GLUCOSE BLOOD TEST: CPT | Mod: 91

## 2017-08-17 PROCEDURE — 88172 CYTP DX EVAL FNA 1ST EA SITE: CPT | Performed by: INTERNAL MEDICINE

## 2017-08-17 PROCEDURE — 00000155 ZZHCL STATISTIC H-CELL BLOCK W/STAIN: Performed by: INTERNAL MEDICINE

## 2017-08-17 PROCEDURE — 25000128 H RX IP 250 OP 636: Performed by: NURSE ANESTHETIST, CERTIFIED REGISTERED

## 2017-08-17 PROCEDURE — 27210794 ZZH OR GENERAL SUPPLY STERILE: Performed by: INTERNAL MEDICINE

## 2017-08-17 PROCEDURE — 40000799 ZZH STATISTIC EUS (OR PROCEDURE): Performed by: INTERNAL MEDICINE

## 2017-08-17 PROCEDURE — 36000058 ZZH SURGERY LEVEL 3 EA 15 ADDTL MIN: Performed by: INTERNAL MEDICINE

## 2017-08-17 PROCEDURE — 82565 ASSAY OF CREATININE: CPT | Performed by: ANESTHESIOLOGY

## 2017-08-17 PROCEDURE — 88305 TISSUE EXAM BY PATHOLOGIST: CPT | Performed by: INTERNAL MEDICINE

## 2017-08-17 PROCEDURE — 88173 CYTOPATH EVAL FNA REPORT: CPT | Mod: 26 | Performed by: INTERNAL MEDICINE

## 2017-08-17 PROCEDURE — 36415 COLL VENOUS BLD VENIPUNCTURE: CPT | Performed by: ANESTHESIOLOGY

## 2017-08-17 RX ORDER — ONDANSETRON 4 MG/1
4 TABLET, ORALLY DISINTEGRATING ORAL EVERY 6 HOURS PRN
Status: CANCELLED | OUTPATIENT
Start: 2017-08-17

## 2017-08-17 RX ORDER — SODIUM CHLORIDE, SODIUM LACTATE, POTASSIUM CHLORIDE, CALCIUM CHLORIDE 600; 310; 30; 20 MG/100ML; MG/100ML; MG/100ML; MG/100ML
INJECTION, SOLUTION INTRAVENOUS CONTINUOUS
Status: DISCONTINUED | OUTPATIENT
Start: 2017-08-17 | End: 2017-08-17 | Stop reason: HOSPADM

## 2017-08-17 RX ORDER — HYDRALAZINE HYDROCHLORIDE 20 MG/ML
2.5-5 INJECTION INTRAMUSCULAR; INTRAVENOUS EVERY 10 MIN PRN
Status: CANCELLED | OUTPATIENT
Start: 2017-08-17

## 2017-08-17 RX ORDER — ONDANSETRON 2 MG/ML
4 INJECTION INTRAMUSCULAR; INTRAVENOUS EVERY 30 MIN PRN
Status: CANCELLED | OUTPATIENT
Start: 2017-08-17

## 2017-08-17 RX ORDER — LIDOCAINE 40 MG/G
CREAM TOPICAL
Status: DISCONTINUED | OUTPATIENT
Start: 2017-08-17 | End: 2017-08-17 | Stop reason: HOSPADM

## 2017-08-17 RX ORDER — FENTANYL CITRATE 50 UG/ML
INJECTION, SOLUTION INTRAMUSCULAR; INTRAVENOUS PRN
Status: DISCONTINUED | OUTPATIENT
Start: 2017-08-17 | End: 2017-08-17

## 2017-08-17 RX ORDER — NALOXONE HYDROCHLORIDE 0.4 MG/ML
.1-.4 INJECTION, SOLUTION INTRAMUSCULAR; INTRAVENOUS; SUBCUTANEOUS
Status: CANCELLED | OUTPATIENT
Start: 2017-08-17 | End: 2017-08-18

## 2017-08-17 RX ORDER — MEPERIDINE HYDROCHLORIDE 25 MG/ML
12.5 INJECTION INTRAMUSCULAR; INTRAVENOUS; SUBCUTANEOUS
Status: CANCELLED | OUTPATIENT
Start: 2017-08-17

## 2017-08-17 RX ORDER — FENTANYL CITRATE 50 UG/ML
25-50 INJECTION, SOLUTION INTRAMUSCULAR; INTRAVENOUS EVERY 5 MIN PRN
Status: CANCELLED | OUTPATIENT
Start: 2017-08-17

## 2017-08-17 RX ORDER — ONDANSETRON 4 MG/1
4 TABLET, ORALLY DISINTEGRATING ORAL EVERY 30 MIN PRN
Status: CANCELLED | OUTPATIENT
Start: 2017-08-17

## 2017-08-17 RX ORDER — PROPOFOL 10 MG/ML
INJECTION, EMULSION INTRAVENOUS PRN
Status: DISCONTINUED | OUTPATIENT
Start: 2017-08-17 | End: 2017-08-17

## 2017-08-17 RX ORDER — ONDANSETRON 2 MG/ML
4 INJECTION INTRAMUSCULAR; INTRAVENOUS EVERY 6 HOURS PRN
Status: CANCELLED | OUTPATIENT
Start: 2017-08-17

## 2017-08-17 RX ORDER — SODIUM CHLORIDE, SODIUM LACTATE, POTASSIUM CHLORIDE, CALCIUM CHLORIDE 600; 310; 30; 20 MG/100ML; MG/100ML; MG/100ML; MG/100ML
INJECTION, SOLUTION INTRAVENOUS CONTINUOUS
Status: CANCELLED | OUTPATIENT
Start: 2017-08-17

## 2017-08-17 RX ORDER — FLUMAZENIL 0.1 MG/ML
0.2 INJECTION, SOLUTION INTRAVENOUS
Status: CANCELLED | OUTPATIENT
Start: 2017-08-17 | End: 2017-08-18

## 2017-08-17 RX ADMIN — PROPOFOL 20 MG: 10 INJECTION, EMULSION INTRAVENOUS at 14:42

## 2017-08-17 RX ADMIN — PROPOFOL 30 MG: 10 INJECTION, EMULSION INTRAVENOUS at 13:52

## 2017-08-17 RX ADMIN — PROPOFOL 20 MG: 10 INJECTION, EMULSION INTRAVENOUS at 14:35

## 2017-08-17 RX ADMIN — PROPOFOL 20 MG: 10 INJECTION, EMULSION INTRAVENOUS at 14:17

## 2017-08-17 RX ADMIN — PROPOFOL 30 MG: 10 INJECTION, EMULSION INTRAVENOUS at 14:41

## 2017-08-17 RX ADMIN — PROPOFOL 20 MG: 10 INJECTION, EMULSION INTRAVENOUS at 14:30

## 2017-08-17 RX ADMIN — PROPOFOL 20 MG: 10 INJECTION, EMULSION INTRAVENOUS at 14:23

## 2017-08-17 RX ADMIN — PROPOFOL 20 MG: 10 INJECTION, EMULSION INTRAVENOUS at 13:57

## 2017-08-17 RX ADMIN — PROPOFOL 20 MG: 10 INJECTION, EMULSION INTRAVENOUS at 14:32

## 2017-08-17 RX ADMIN — PROPOFOL 50 MG: 10 INJECTION, EMULSION INTRAVENOUS at 14:19

## 2017-08-17 RX ADMIN — PROPOFOL 30 MG: 10 INJECTION, EMULSION INTRAVENOUS at 14:08

## 2017-08-17 RX ADMIN — PROPOFOL 30 MG: 10 INJECTION, EMULSION INTRAVENOUS at 13:59

## 2017-08-17 RX ADMIN — PROPOFOL 20 MG: 10 INJECTION, EMULSION INTRAVENOUS at 14:27

## 2017-08-17 RX ADMIN — SODIUM CHLORIDE, POTASSIUM CHLORIDE, SODIUM LACTATE AND CALCIUM CHLORIDE: 600; 310; 30; 20 INJECTION, SOLUTION INTRAVENOUS at 14:51

## 2017-08-17 RX ADMIN — PROPOFOL 20 MG: 10 INJECTION, EMULSION INTRAVENOUS at 14:39

## 2017-08-17 RX ADMIN — PROPOFOL 30 MG: 10 INJECTION, EMULSION INTRAVENOUS at 14:37

## 2017-08-17 RX ADMIN — MIDAZOLAM HYDROCHLORIDE 2 MG: 1 INJECTION, SOLUTION INTRAMUSCULAR; INTRAVENOUS at 13:47

## 2017-08-17 RX ADMIN — SODIUM CHLORIDE, POTASSIUM CHLORIDE, SODIUM LACTATE AND CALCIUM CHLORIDE: 600; 310; 30; 20 INJECTION, SOLUTION INTRAVENOUS at 13:21

## 2017-08-17 RX ADMIN — PROPOFOL 20 MG: 10 INJECTION, EMULSION INTRAVENOUS at 14:10

## 2017-08-17 RX ADMIN — PROPOFOL 20 MG: 10 INJECTION, EMULSION INTRAVENOUS at 14:01

## 2017-08-17 RX ADMIN — PROPOFOL 30 MG: 10 INJECTION, EMULSION INTRAVENOUS at 14:04

## 2017-08-17 RX ADMIN — FENTANYL CITRATE 50 MCG: 50 INJECTION, SOLUTION INTRAMUSCULAR; INTRAVENOUS at 13:55

## 2017-08-17 RX ADMIN — PROPOFOL 20 MG: 10 INJECTION, EMULSION INTRAVENOUS at 13:54

## 2017-08-17 NOTE — ANESTHESIA POSTPROCEDURE EVALUATION
Patient: Ramon Winter    Procedure(s):  ENDOSCOPIC ULTRASOUND, ESOPHAGOSCOPY, GASTROSCOPY, DUODENOSCOPY (EGD) with fine needle aspiration - Wound Class: II-Clean Contaminated    Diagnosis:ge junction mass  Diagnosis Additional Information: Procedure(s):  ENDOSCOPIC ULTRASOUND, ESOPHAGOSCOPY, GASTROSCOPY, DUODENOSCOPY (EGD) with fine needle aspiration - Wound Class: II-Clean Contaminated     Diagnosis: ge junction mass    Anesthesia Type:  MAC    Note:  Anesthesia Post Evaluation    Patient location during evaluation: Phase 2  Patient participation: Able to fully participate in evaluation  Level of consciousness: awake  Pain management: adequate  Airway patency: patent  Cardiovascular status: acceptable  Respiratory status: acceptable  Hydration status: acceptable  PONV: controlled     Anesthetic complications: None          Last vitals:  Vitals:    08/17/17 1252 08/17/17 1504   BP: (!) 131/92 97/56   Resp: 14    Temp:  97  F (36.1  C)   SpO2: 98% 93%         Electronically Signed By: Shamir Chauhan MD  August 17, 2017  3:22 PM

## 2017-08-17 NOTE — ANESTHESIA CARE TRANSFER NOTE
Patient: Ramon Winter    Procedure(s):  ENDOSCOPIC ULTRASOUND, ESOPHAGOSCOPY, GASTROSCOPY, DUODENOSCOPY (EGD) with fine needle aspiration - Wound Class: II-Clean Contaminated    Diagnosis: ge junction mass  Diagnosis Additional Information: No value filed.    Anesthesia Type:   MAC     Note:  Airway :Room Air  Patient transferred to:Phase II  Comments: Pt VSS, comfortable, awake, tx to phase 2, monitors on and report to RN      Vitals: (Last set prior to Anesthesia Care Transfer)    CRNA VITALS  8/17/2017 1428 - 8/17/2017 1503      8/17/2017             Pulse: 56    SpO2: 98 %                Electronically Signed By: JINNY Clark CRNA  August 17, 2017  3:03 PM

## 2017-08-17 NOTE — ANESTHESIA PREPROCEDURE EVALUATION
Anesthesia Evaluation     .             ROS/MED HX    ENT/Pulmonary:       Neurologic:       Cardiovascular:     (+) Dyslipidemia, hypertension----. : . . . :. .       METS/Exercise Tolerance:     Hematologic:         Musculoskeletal:   (+) arthritis, , , other musculoskeletal (rotator cuff disorder)-       GI/Hepatic:     (+) GERD       Renal/Genitourinary:         Endo:     (+) type II DM .      Psychiatric:         Infectious Disease:         Malignancy:         Other:                     Physical Exam      Airway   Mallampati: II  TM distance: >3 FB  Neck ROM: full    Dental     Cardiovascular   Rhythm and rate: regular and normal      Pulmonary    breath sounds clear to auscultation                    Anesthesia Plan      History & Physical Review  History and physical reviewed and following examination; no interval change.    ASA Status:  3 .    NPO Status:  > 8 hours    Plan for MAC with Intravenous and Propofol induction. Maintenance will be TIVA.    PONV prophylaxis:  Ondansetron (or other 5HT-3)       Postoperative Care  Postoperative pain management:  IV analgesics, Oral pain medications and Multi-modal analgesia.      Consents  Anesthetic plan, risks, benefits and alternatives discussed with:  Patient..                          .

## 2017-08-21 LAB — COPATH REPORT: NORMAL

## 2017-09-22 ENCOUNTER — HOSPITAL ENCOUNTER (EMERGENCY)
Facility: CLINIC | Age: 72
Discharge: HOME | End: 2017-09-22
Attending: EMERGENCY MEDICINE | Admitting: EMERGENCY MEDICINE
Payer: MEDICARE

## 2017-09-22 ENCOUNTER — APPOINTMENT (OUTPATIENT)
Dept: CT IMAGING | Facility: CLINIC | Age: 72
End: 2017-09-22
Attending: EMERGENCY MEDICINE
Payer: MEDICARE

## 2017-09-22 VITALS
RESPIRATION RATE: 22 BRPM | HEART RATE: 75 BPM | SYSTOLIC BLOOD PRESSURE: 138 MMHG | TEMPERATURE: 97.6 F | OXYGEN SATURATION: 97 % | DIASTOLIC BLOOD PRESSURE: 82 MMHG

## 2017-09-22 DIAGNOSIS — D72.819 LEUKOPENIA, UNSPECIFIED TYPE: ICD-10-CM

## 2017-09-22 DIAGNOSIS — R06.00 DYSPNEA, UNSPECIFIED TYPE: ICD-10-CM

## 2017-09-22 DIAGNOSIS — R91.8 PULMONARY NODULES: ICD-10-CM

## 2017-09-22 DIAGNOSIS — R51.9 NONINTRACTABLE HEADACHE, UNSPECIFIED CHRONICITY PATTERN, UNSPECIFIED HEADACHE TYPE: ICD-10-CM

## 2017-09-22 DIAGNOSIS — R11.11 VOMITING WITHOUT NAUSEA, INTRACTABILITY OF VOMITING NOT SPECIFIED, UNSPECIFIED VOMITING TYPE: ICD-10-CM

## 2017-09-22 LAB
ALBUMIN UR-MCNC: NEGATIVE MG/DL
ANION GAP SERPL CALCULATED.3IONS-SCNC: 8 MMOL/L (ref 3–14)
APPEARANCE UR: ABNORMAL
BACTERIA #/AREA URNS HPF: ABNORMAL /HPF
BASOPHILS # BLD AUTO: 0 10E9/L (ref 0–0.2)
BASOPHILS NFR BLD AUTO: 1.2 %
BILIRUB UR QL STRIP: NEGATIVE
BUN SERPL-MCNC: 36 MG/DL (ref 7–30)
CALCIUM SERPL-MCNC: 9.2 MG/DL (ref 8.5–10.1)
CHLORIDE SERPL-SCNC: 102 MMOL/L (ref 94–109)
CO2 SERPL-SCNC: 24 MMOL/L (ref 20–32)
COLOR UR AUTO: YELLOW
CREAT SERPL-MCNC: 1.36 MG/DL (ref 0.66–1.25)
DIFFERENTIAL METHOD BLD: ABNORMAL
EOSINOPHIL # BLD AUTO: 0.1 10E9/L (ref 0–0.7)
EOSINOPHIL NFR BLD AUTO: 5.6 %
ERYTHROCYTE [DISTWIDTH] IN BLOOD BY AUTOMATED COUNT: 13.7 % (ref 10–15)
GFR SERPL CREATININE-BSD FRML MDRD: 52 ML/MIN/1.7M2
GLUCOSE SERPL-MCNC: 183 MG/DL (ref 70–99)
GLUCOSE UR STRIP-MCNC: NEGATIVE MG/DL
HCT VFR BLD AUTO: 38.2 % (ref 40–53)
HGB BLD-MCNC: 13.2 G/DL (ref 13.3–17.7)
HGB UR QL STRIP: NEGATIVE
IMM GRANULOCYTES # BLD: 0 10E9/L (ref 0–0.4)
IMM GRANULOCYTES NFR BLD: 2.5 %
KETONES UR STRIP-MCNC: NEGATIVE MG/DL
LEUKOCYTE ESTERASE UR QL STRIP: NEGATIVE
LYMPHOCYTES # BLD AUTO: 0.2 10E9/L (ref 0.8–5.3)
LYMPHOCYTES NFR BLD AUTO: 9.3 %
MAGNESIUM SERPL-MCNC: 1.6 MG/DL (ref 1.6–2.3)
MCH RBC QN AUTO: 32.6 PG (ref 26.5–33)
MCHC RBC AUTO-ENTMCNC: 34.6 G/DL (ref 31.5–36.5)
MCV RBC AUTO: 94 FL (ref 78–100)
MONOCYTES # BLD AUTO: 0.2 10E9/L (ref 0–1.3)
MONOCYTES NFR BLD AUTO: 10.5 %
MUCOUS THREADS #/AREA URNS LPF: PRESENT /LPF
NEUTROPHILS # BLD AUTO: 1.2 10E9/L (ref 1.6–8.3)
NEUTROPHILS NFR BLD AUTO: 70.9 %
NITRATE UR QL: NEGATIVE
NRBC # BLD AUTO: 0 10*3/UL
NRBC BLD AUTO-RTO: 0 /100
PH UR STRIP: 6 PH (ref 5–7)
PLATELET # BLD AUTO: 155 10E9/L (ref 150–450)
POTASSIUM SERPL-SCNC: 4.5 MMOL/L (ref 3.4–5.3)
RBC # BLD AUTO: 4.05 10E12/L (ref 4.4–5.9)
RBC #/AREA URNS AUTO: 1 /HPF (ref 0–2)
SODIUM SERPL-SCNC: 134 MMOL/L (ref 133–144)
SOURCE: ABNORMAL
SP GR UR STRIP: 1.02 (ref 1–1.03)
SPERM #/AREA URNS HPF: PRESENT /HPF
UROBILINOGEN UR STRIP-MCNC: 0 MG/DL (ref 0–2)
WBC # BLD AUTO: 1.6 10E9/L (ref 4–11)
WBC #/AREA URNS AUTO: 3 /HPF (ref 0–2)

## 2017-09-22 PROCEDURE — 80048 BASIC METABOLIC PNL TOTAL CA: CPT | Performed by: EMERGENCY MEDICINE

## 2017-09-22 PROCEDURE — 71260 CT THORAX DX C+: CPT

## 2017-09-22 PROCEDURE — 25000128 H RX IP 250 OP 636: Performed by: EMERGENCY MEDICINE

## 2017-09-22 PROCEDURE — 96361 HYDRATE IV INFUSION ADD-ON: CPT

## 2017-09-22 PROCEDURE — 93005 ELECTROCARDIOGRAM TRACING: CPT

## 2017-09-22 PROCEDURE — 99285 EMERGENCY DEPT VISIT HI MDM: CPT | Mod: 25

## 2017-09-22 PROCEDURE — 96374 THER/PROPH/DIAG INJ IV PUSH: CPT

## 2017-09-22 PROCEDURE — 81001 URINALYSIS AUTO W/SCOPE: CPT | Performed by: EMERGENCY MEDICINE

## 2017-09-22 PROCEDURE — 83735 ASSAY OF MAGNESIUM: CPT | Performed by: EMERGENCY MEDICINE

## 2017-09-22 PROCEDURE — 85025 COMPLETE CBC W/AUTO DIFF WBC: CPT | Performed by: EMERGENCY MEDICINE

## 2017-09-22 RX ORDER — ONDANSETRON 2 MG/ML
4 INJECTION INTRAMUSCULAR; INTRAVENOUS ONCE
Status: COMPLETED | OUTPATIENT
Start: 2017-09-22 | End: 2017-09-22

## 2017-09-22 RX ORDER — SODIUM CHLORIDE 9 MG/ML
1000 INJECTION, SOLUTION INTRAVENOUS CONTINUOUS
Status: DISCONTINUED | OUTPATIENT
Start: 2017-09-22 | End: 2017-09-23 | Stop reason: HOSPADM

## 2017-09-22 RX ORDER — IOPAMIDOL 755 MG/ML
500 INJECTION, SOLUTION INTRAVASCULAR ONCE
Status: COMPLETED | OUTPATIENT
Start: 2017-09-22 | End: 2017-09-22

## 2017-09-22 RX ORDER — BUTALBITAL, ACETAMINOPHEN AND CAFFEINE 50; 325; 40 MG/1; MG/1; MG/1
1 TABLET ORAL EVERY 4 HOURS PRN
Qty: 20 TABLET | Refills: 0 | Status: ON HOLD | OUTPATIENT
Start: 2017-09-22 | End: 2017-11-07

## 2017-09-22 RX ADMIN — SODIUM CHLORIDE 98 ML: 9 INJECTION, SOLUTION INTRAVENOUS at 20:33

## 2017-09-22 RX ADMIN — ONDANSETRON 4 MG: 2 INJECTION INTRAMUSCULAR; INTRAVENOUS at 19:43

## 2017-09-22 RX ADMIN — IOPAMIDOL 81 ML: 755 INJECTION, SOLUTION INTRAVENOUS at 20:33

## 2017-09-22 RX ADMIN — SODIUM CHLORIDE 1000 ML: 9 INJECTION, SOLUTION INTRAVENOUS at 18:58

## 2017-09-22 ASSESSMENT — ENCOUNTER SYMPTOMS
NAUSEA: 1
VOMITING: 1
SHORTNESS OF BREATH: 1
DIZZINESS: 1

## 2017-09-22 NOTE — ED PROVIDER NOTES
History     Chief Complaint:  Shortness of Breath      The history is provided by the patient.      Ramon Winter is a 71 year old male who presents with shortness of breath. 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 He gets chemo on Mondays and radiation every day at [facility]. The patient states that for the past 3 days he has been sleeping more than normal and when he stands he gets a sensation of dizziness and shortness of breath. He says that his symptoms go away with rest. He was prompted to the emergency department after speaking with his oncologist today. He had an episode of vomiting this morning and another about a half hour ago. His nausea has resolved. He denies any shortness of breath while resting and has no other complaints.     Allergies:  Lisinopril     Medications:    Tenoretic  Metformin   Lisinopril   Allopurinol   Glipzide   Venlafaxine  Omeprazole   Aspirin     Past Medical History:    DM  GERD  Hyperlipidemia  Hypertension  Ostearthritis   Chronic pain     Past Surgical History:    ENT surgery, tonsillectomy   EGD X 2  Keratotomy arcuate with femto second  Surgery right knee   Surgery right shoulder   Phacoemulsification clear cornea with standard intraocular lens implant     Family History:    History reviewed. No pertinent family history.      Social History:  Presents with no one    Tobacco use: Former smoker   Alcohol use: No  PCP: Joshua Gonsalez    Marital Status:       Review of Systems   Respiratory: Positive for shortness of breath.    Gastrointestinal: Positive for nausea (Resolved) and vomiting.   Neurological: Positive for dizziness.   All other systems reviewed and are negative.    Physical Exam     Patient Vitals for the past 24 hrs:   BP Temp Temp src Pulse Resp SpO2   09/22/17 2210 - - - - - 97 %   09/22/17 2207 138/82 - - - - -   09/22/17 1925 138/84 - - - - 99 %   09/22/17 1841 - - - - - 98 %   09/22/17 1840  119/84 - - - - -   09/22/17 1721 143/82 97.6  F (36.4  C) Oral 75 22 100 %        Physical Exam  Constitutional: Patient appears well-developed and well-nourished. There is no acute distress.   Head: No external signs of trauma noted.  Neck: No JVD noted  Eyes: Pupils are equal, round, and reactive to light.   Cardiovascular: Normal rate, regular rhythm and normal heart sounds.  Exam reveals no gallop and no friction rub.  No murmur heard. Normal peripheral pulses.  Pulmonary/Chest: Effort normal and breath sounds normal. No respiratory distress. Patient has no wheezes. Patient has no rales.   Abdominal: Soft. There is no tenderness.   Extremities: No edema noted  Neurological: Patient is alert and oriented to person, place, and time. No gross weakness or sensation loss.  Skin: Skin is warm and dry. There is no diaphoresis noted.       Emergency Department Course   ECG (17:28:25):  Rate 72 bpm. NM interval 144. QRS duration 104. QT/QTc 376/411. P-R-T axes -4 35 32. Normal sinus rhythm. Normal ECG. Agree with computer interpretation.  Interpreted at 1730 by Babatunde Murphy DO  .   Imaging:  CT Chest, IV contrast only, PE protocol:  IMPRESSION:  1. No CT evidence of pulmonary embolism.  2. Old granulomatous disease.  3. Several tiny indeterminate right pulmonary nodules. If the patient has risk factors for neoplastic disease, 12-month follow-up is recommended.  4. Hiatal hernia.  5. Coronary artery calcification.  6. Hepatic fatty infiltration--possible etiologies include consumption of alcohol or excessive carbohydrate intake, especially sugar/fructose.  Metabolic syndrome commonly occurs in combination  with nonalcoholic fatty liver disease. Although often reversible, nonalcoholic fatty liver disease can progress to steatohepatitis and cirrhosis.    FAUSTO ALVAREZ MD    Imaging independently reviewed and agree with radiologist interpretation.      Laboratory:  CBC: WBC 1.6 (L), HGB 13.2 (L), ow WNL ( )     BMP: Glucose 183 (H), BUN 36 (H), Creatinine 1.36 (H), GFR 52 (L), ow WNL   Magnesium: 1.6  UA: Bacteria many (A), Mucous present (A), SPERM present (A), o/w Negative     Interventions:  1858: NS 1L IV Bolus    1943: Zofran 4 mg IV     Emergency Department Course:  Past medical records, nursing notes, and vitals reviewed.  1828: I performed an exam of the patient and obtained history, as documented above.  IV inserted and blood drawn.   Above interventions provided.   The patient was sent for a CT while in the emergency department, findings above.   I personally reviewed the laboratory results with the Patient and answered all related questions prior to 2149: I spoke with Dr. Randolph of oncology regarding this patient.   2215: I rechecked the patient. Findings and plan explained to the Patient. Patient discharged home with instructions regarding supportive care, medications, and reasons to return. The importance of close follow-up was reviewed.       Impression & Plan    Medical Decision Making:  Ramon Winter is a 71 year old male in room 4. Patient presents to the ER for evaluation of dyspnea as well as nausea vomiting and lightheaded. Please see the HPI and exam for specifics. The patient's work up in the ER was negative. Aside from low white blood cell count. I discussed this with the on call oncologist who states the patient can still follow up in the outpatient setting. After this I discussed the findings with the patient and he states he has had small but increasingly uncomfortable headache since starting chemotherapy several weeks ago. I do not see any focal neurological deficits and I did offer neurological imaging but he would rather follow up in the outpatient setting for this. He was discharged in stable condition with his wife. Anticipatory guidance given prior to discharge.     Impression:    ICD-10-CM    1. Vomiting without nausea, intractability of vomiting not specified, unspecified vomiting  type R11.11    2. Leukopenia, unspecified type D72.819    3. Nonintractable headache, unspecified chronicity pattern, unspecified headache type R51    4. Dyspnea, unspecified type R06.00    5. Pulmonary nodules R91.8        Disposition:  Discharged to home.    Discharge Medications:  Discharge Medication List as of 9/22/2017 10:25 PM      START taking these medications    Details   butalbital-acetaminophen-caffeine (FIORICET/ESGIC) -40 MG per tablet Take 1 tablet by mouth every 4 hours as needed, Disp-20 tablet, R-0, Local PrintDo not exceed 4000 mg acetaminophen per day from all sources. Do not drive or operate machinery while taking this medication.               Cole Covington  9/22/2017   [facility] EMERGENCY DEPARTMENT  ICole, am serving as a scribe at 6:28 PM on 9/22/2017 to document services personally performed by Babatunde Murphy DO based on my observations and the provider's statements to me.       Babatunde Murphy DO  09/22/17 [id number]

## 2017-09-22 NOTE — ED AVS SNAPSHOT
[facility] Emergency Department    [address]    [facility] [address]    Phone:  [phone]    Fax:  [phone]                                       Ramon Winter   MRN: [id number]    Department:  [facility] Emergency Department   Date of Visit:  9/22/2017           Patient Information     Date Of Birth          1945        Your diagnoses for this visit were:     Vomiting without nausea, intractability of vomiting not specified, unspecified vomiting type     Leukopenia, unspecified type     Nonintractable headache, unspecified chronicity pattern, unspecified headache type     Dyspnea, unspecified type     Pulmonary nodules        You were seen by Babatunde Murphy DO.      Follow-up Information     Follow up with Joshua Gonsalez MD. Call in 2 days.    Specialty:  Family Practice    Why:  As needed    Contact information:    [facility]  [address]  [facility] [address] [phone]          Follow up with Your oncologist.        Follow up with [facility].    Specialty:  Pulmonology    Why:  After discussion with your oncologist    Contact information:    [address] [place] [address] [phone]    Additional information:    Located in the Clinics and [facility] Center at [address] [place] [address].   parking is very convenient and highly recommended.  is a $6 flat rate fee.  Both  and self parkers should enter the main arrival plaza from [facility]; parking attendants will direct you based on your parking preference.        Follow up with [facility].    Specialty:  EMERGENCY MEDICINE    Why:  If symptoms worsen    Contact information:    [address]  [facility] [id number] [phone]        Discharge Instructions         Pulmonary Nodules  Dizziness (Uncertain Cause)  Dizziness is a common  symptom. It may be described as lightheadedness, spinning, or feeling like you are going to faint. Dizziness can have many causes.  Be sure to tell the healthcare provider about:    All medicines you take, including prescription, over-the-counter, herbs, and supplements    Any other symptoms you have    Any health problems you are being treated for    Anything that causes the dizziness to get worse or better  Today's exam did not show an exact cause for your dizziness. Other tests may be needed. Follow up with your healthcare provider.  Home care    Dizziness that occurs with sudden standing may be a sign of mild dehydration. Drink extra fluids for the next few days.    If you recently started a new medicine, stopped a medicine, or had the dose of a current medicine changed, talk with the prescribing healthcare provider. Your medicine plan may need adjustment.    If dizziness lasts more than a few seconds, sit or lie down until it passes. This may help prevent injury in case you pass out.    Do not drive or use power tools or dangerous equipment until you have had no dizziness for at least 48 hours.  Follow-up care  Follow up with your healthcare provider for further evaluation within the next 7 days or as advised.  When to seek medical advice  Call your healthcare provider for any of the following:    Worsening of symptoms or new symptoms    Passing out or seizure    Repeated vomiting    Headache    Palpitations (the sense that your heart is fluttering or beating fast or hard)    Shortness of breath    Blood in vomit or stool (black or red color)    Weakness of an arm or leg or one side of the face    Vision or hearing changes    Trouble walking or speaking    Chest, arm, neck, back, or jaw pain  Date Last Reviewed: 8/23/2015 2000-2017 [employer]. [address] [place] [address], [place] [address]. All rights reserved. This information is not intended as a substitute for professional medical care. Always  "follow your healthcare professional's instructions.         * VOMITING [6yr-Adult]  Vomiting is a common symptom that may be due to different causes. These include gastroenteritis (\"stomach-flu\"), food poisoning and gastritis. There are other more serious causes of vomiting which may be hard to diagnose early in the illness. Therefore, it is important to watch for the warning signs listed below.  The main danger from repeated vomiting is \"dehydration\". This is due to excess loss of water and minerals from the body. When this occurs, body fluids must be replaced.`  HOME CARE:    If symptoms are severe, rest at home for the next 24 hours.    You may use acetaminophen (Tylenol) 650-1000 mg every 6 hours to control fever, unless another medicine was prescribed. [ NOTE : If you have chronic liver disease, talk with your doctor before using acetaminophen.] (Aspirin should never be used in anyone under 18 years of age who is ill with a fever. It may cause severe liver damage.)    Avoid tobacco and alcohol use, which may worsen your symptoms.    If medicines for vomiting were prescribed, take as directed.  DURING THE FIRST 12-24 HOURS follow the diet below. Try to take frequent small sips even if you vomit occasionally:    FRUIT JUICES: Apple, grape juice, clear fruit drinks, electrolyte replacement and sports drinks.    BEVERAGES: Sport drinks such as Gatorade, soft drinks without caffeine; mineral water (plain or flavored), decaffeinated tea and coffee.    SOUPS: Clear broth, consommé and bouillon    DESSERTS: Plain gelatin (Jell-O), popsicles and fruit juice bars.  DURING THE NEXT 24 HOURS you may add the following to the above:    Hot cereal, plain toast, bread, rolls, crackers    Plain noodles, rice, mashed potatoes, chicken noodle or rice soup    Unsweetened canned fruit (avoid pineapple), bananas    Avoid dairy products    Limit caffeine and chocolate. No spices or seasonings except salt.  DURING THE NEXT 24 " HOURS  Slowly go back to a normal diet, as you feel better and your symptoms lessen.  FOLLOW UP with your doctor as advised if you are not improving over the next 2-3 days.  GET PROMPT MEDICAL ATTENTION if any of the following occur:    Constant abdominal pain that stays in the same spot or gets worse    Continued vomiting (unable to keep liquids down) for 24 hours    Frequent diarrhea (more than 5 times a day); blood (red or black color) in diarrhea    No urine output for 12 hours or extreme thirst    Weakness, dizziness or fainting    Unusually drowsy or confused    Fever over 101.0  F (38.3  C) for more than 3 days    Yellow color of the eyes or skin    [id number] [employer] [facility], [address] [place] [address], [place] [address]. All rights reserved. This information is not intended as a substitute for professional medical care. Always follow your healthcare professional's instructions.    A pulmonary nodule is small area of abnormal tissue in the lung. It is usually found on an X-ray taken for other reasons. It is a single spot (lesion) up to about an inch in size, surrounded by normal lung tissue.  Most nodules are not cancerous (benign). However, a nodule could be an early stage of lung cancer. Or it may be a sign of cancer that has spread from another part of the body. When a nodule is found on a chest X-ray, further testing is needed to determine if it is benign or cancerous (malignant). To give your healthcare provider more information about the nodule, you may have one or more of these tests:    Comparison of a new X-ray to earlier X-rays    Chest CT scan    Bronchoscopy (a procedure that allows the healthcare provider to see the air passages inside the lung)    Needle biopsy  Test results    If your nodule is benign, continued follow-up over the next 5 years is usually advised.    If tests do not determine whether your nodule is benign or malignant, surgery may be advised.    If tests show that the nodule is  definitely malignant, surgery will probably be advised.  The best survival rates from lung cancer occur when the original tumor is small (less than 1 inch). Follow your healthcare provider's advice on the timing of further testing. Prompt treatment gives the best chance of curing lung cancer.  Prevention  Smoking remains one of the biggest risk factors for lung cancer. If you smoke, it is essential that you quit to lower your risk of lung cancer. Talk to your healthcare provider about things that can help you quit, including medicines and support groups. See the following websites for more information:    www.smokefree.gov    www.quitnet.com  Home care  Most people with a pulmonary nodule have no symptoms. So no special home care is required. You may return to your usual activities and diet.  Follow-up care  Follow up with your healthcare provider, or as advised.  More information about lung cancer is available from these resources:    American Lung Association: 323.327.9375, www.lung.org    National Cancer [place]: 794.413.2215, www.cancer.gov  When to seek medical advice  Call your healthcare provider right away if any of these occur:    Fever of 100.4 F (38 C) or higher    Unintended weight change  Call 911  Contact emergency services right away if any of these occur:    Coughing up blood    Chest pain or shortness of breath  Date Last Reviewed: 9/13/2015 2000-2017 [employer]. [address] [place] [address], [place] [address]. All rights reserved. This information is not intended as a substitute for professional medical care. Always follow your healthcare professional's instructions.        Shortness of Breath (Dyspnea)  Shortness of breath is the feeling that you can't catch your breath or get enough air. It is also known as dyspnea.  Dyspnea can be caused by many different conditions. They include:    Acute asthma attack.    Worsening of chronic lung diseases such as chronic bronchitis and  emphysema.    Heart failure. This is when weak heart muscle allows extra fluid to collect in the lungs.    Panic attacks or anxiety. Fear can cause rapid breathing (hyperventilation).    Pneumonia, or an infection in the lung tissue.    Exposure to toxic substances, fumes, smoke, or certain medicines.    Blood clot in the lung (pulmonary embolism). This is often from a piece of blood clot in a deep vein of the leg (deep vein thrombosis) that breaks off and travels to the lungs.    Heart attack or heart-related chest pain (angina).    Anemia.    Collapsed lung (pneumothorax).    Dehydration.    Pregnancy.  Based on your visit today, the exact cause of your shortness of breath is not certain. Your tests don t show any of the serious causes of dyspnea. You may need other tests to find out if you have a serious problem. It s important to watch for any new symptoms or symptoms that get worse. Follow up with your healthcare provider as directed.  Home care  Follow these tips to take care of yourself at home:    When your symptoms are better, go back to your usual activities.    If you smoke, you should stop. Join a quit-smoking program or ask your healthcare provider for help.    Eat a healthy diet and get plenty of sleep.    Get regular exercise. Talk with your healthcare provider before starting to exercise, especially if you have other medical problems.    Cut down on the amount of caffeine and stimulants you consume.  Follow-up care  Follow up with your healthcare provider, or as advised.  If tests were done, you will be told if your treatment needs to be changed. You can call as directed for the results.  (Note: If an X-ray was taken, a specialist will review it. You will be notified of any new findings that may affect your care.)  Call 911 or get immediate medical care  Shortness of breath may be a sign of a serious medical problem. For example, it may be a problem with your heart or lungs. Call 911 if you have  worsening shortness of breath or trouble breathing, especially with any of the symptoms below:    You are confused or it s difficult to wake you.    You faint or lose consciousness.    You have a fast heartbeat, or your heartbeat is irregular.    You are coughing up blood.    You have pain in your chest, arm, shoulder, neck, or upper back.    You break out in a sweat.  When to seek medical advice  Call your healthcare provider right away if any of these occur:    Slight shortness of breath or wheezing    Redness, pain or swelling in your leg, arm, or other body area    Swelling in both legs or ankles    Fast weight gain    Dizziness or weakness    Fever of 100.4 F (38 C) or higher, or as directed by your healthcare provider  Date Last Reviewed: 9/13/2015 2000-2017 [employer]. [address] [place] [address]. All rights reserved. This information is not intended as a substitute for professional medical care. Always follow your healthcare professional's instructions.          24 Hour Appointment Hotline       To make an appointment at any [facility], call [phone] ([phone]). If you don't have a family doctor or clinic, we will help you find one. [facility] are conveniently located to serve the needs of you and your family.             Review of your medicines      START taking        Dose / Directions Last dose taken    butalbital-acetaminophen-caffeine -40 MG per tablet   Commonly known as:  FIORICET/ESGIC   Dose:  1 tablet   Quantity:  20 tablet        Take 1 tablet by mouth every 4 hours as needed   Refills:  0          Our records show that you are taking the medicines listed below. If these are incorrect, please call your family doctor or clinic.        Dose / Directions Last dose taken    ALLOPURINOL PO        Refills:  0        ASPIRIN PO   Dose:  81 mg        Take 81 mg by mouth   Refills:  0        atenolol-chlorthalidone 100-25 MG per tablet    Commonly known as:  TENORETIC   Dose:  1 tablet        Take 1 tablet by mouth daily   Refills:  0        CRESTOR PO   Dose:  40 mg        Take 40 mg by mouth daily   Refills:  0        GLIPIZIDE PO   Dose:  5 mg        Take 5 mg by mouth every morning (before breakfast)   Refills:  0        LISINOPRIL PO   Dose:  10 mg        Take 10 mg by mouth daily   Refills:  0        METFORMIN HCL PO   Dose:  1000 mg        Take 1,000 mg by mouth 2 times daily (with meals)   Refills:  0        OMEPRAZOLE PO   Dose:  20 mg        Take 20 mg by mouth   Refills:  0        VENLAFAXINE HCL PO   Dose:  75 mg        Take 75 mg by mouth 2 times daily   Refills:  0                Prescriptions were sent or printed at these locations (1 Prescription)                   Other Prescriptions                Printed at Department/Unit printer (1 of 1)         butalbital-acetaminophen-caffeine (FIORICET/ESGIC) -40 MG per tablet                Procedures and tests performed during your visit     Basic metabolic panel    CBC with platelets differential    Chest CT, IV contrast only - PE protocol    EKG 12 lead    Magnesium    UA with Microscopic      Orders Needing Specimen Collection     None      Pending Results     Date and Time Order Name Status Description    9/22/2017 2210 EKG 12 lead Preliminary             Pending Culture Results     No orders found from 9/20/2017 to 9/23/2017.            Pending Results Instructions     If you had any lab results that were not finalized at the time of your Discharge, you can call the ED Lab Result RN at [phone]. You will be contacted by this team for any positive Lab results or changes in treatment. The nurses are available 7 days a week from 10A to 6:30P.  You can leave a message 24 hours per day and they will return your call.        Test Results From Your Hospital Stay        9/22/2017  6:28 PM      Component Results     Component Value Ref Range & Units Status    Color Urine Yellow   Final    Appearance Urine Slightly Cloudy  Final    Glucose Urine Negative NEG^Negative mg/dL Final    Bilirubin Urine Negative NEG^Negative Final    Ketones Urine Negative NEG^Negative mg/dL Final    Specific Gravity Urine 1.018 1.003 - 1.035 Final    Blood Urine Negative NEG^Negative Final    pH Urine 6.0 5.0 - 7.0 pH Final    Protein Albumin Urine Negative NEG^Negative mg/dL Final    Urobilinogen mg/dL 0.0 0.0 - 2.0 mg/dL Final    Nitrite Urine Negative NEG^Negative Final    Leukocyte Esterase Urine Negative NEG^Negative Final    Source Midstream Urine  Final    WBC Urine 3 (H) 0 - 2 /HPF Final    RBC Urine 1 0 - 2 /HPF Final    Bacteria Urine Many (A) NEG^Negative /HPF Final    Mucous Urine Present (A) NEG^Negative /LPF Final    sperm Present (A) NEG^Negative /HPF Final         9/22/2017  7:15 PM      Component Results     Component Value Ref Range & Units Status    WBC 1.6 (L) 4.0 - 11.0 10e9/L Final    RBC Count 4.05 (L) 4.4 - 5.9 10e12/L Final    Hemoglobin 13.2 (L) 13.3 - 17.7 g/dL Final    Hematocrit 38.2 (L) 40.0 - 53.0 % Final    MCV 94 78 - 100 fl Final    MCH 32.6 26.5 - 33.0 pg Final    MCHC 34.6 31.5 - 36.5 g/dL Final    RDW 13.7 10.0 - 15.0 % Final    Platelet Count 155 150 - 450 10e9/L Final    Diff Method Automated Method  Final    % Neutrophils 70.9 % Final    % Lymphocytes 9.3 % Final    % Monocytes 10.5 % Final    % Eosinophils 5.6 % Final    % Basophils 1.2 % Final    % Immature Granulocytes 2.5 % Final    Nucleated RBCs 0 0 /100 Final    Absolute Neutrophil 1.2 (L) 1.6 - 8.3 10e9/L Final    Absolute Lymphocytes 0.2 (L) 0.8 - 5.3 10e9/L Final    Absolute Monocytes 0.2 0.0 - 1.3 10e9/L Final    Absolute Eosinophils 0.1 0.0 - 0.7 10e9/L Final    Absolute Basophils 0.0 0.0 - 0.2 10e9/L Final    Abs Immature Granulocytes 0.0 0 - 0.4 10e9/L Final    Absolute Nucleated RBC 0.0  Final         9/22/2017  7:51 PM      Component Results     Component Value Ref Range & Units Status    Sodium 134 133 - 144  mmol/L Final    Results confirmed by repeat test    Potassium 4.5 3.4 - 5.3 mmol/L Final    Chloride 102 94 - 109 mmol/L Final    Carbon Dioxide 24 20 - 32 mmol/L Final    Anion Gap 8 3 - 14 mmol/L Final    Glucose 183 (H) 70 - 99 mg/dL Final    Urea Nitrogen 36 (H) 7 - 30 mg/dL Final    Creatinine 1.36 (H) 0.66 - 1.25 mg/dL Final    GFR Estimate 52 (L) >60 mL/min/1.7m2 Final    Non  GFR Calc    GFR Estimate If Black 62 >60 mL/min/1.7m2 Final    African American GFR Calc    Calcium 9.2 8.5 - 10.1 mg/dL Final         9/22/2017  7:51 PM      Component Results     Component Value Ref Range & Units Status    Magnesium 1.6 1.6 - 2.3 mg/dL Final         9/22/2017  9:27 PM      Narrative     CT CHEST PULMONARY EMBOLISM WITH CONTRAST 9/22/2017 8:51 PM     HISTORY: Dyspnea    CONTRAST DOSE: 81 mL Isovue-370    Radiation dose for this scan was reduced using automated exposure  control, adjustment of the mA and/or kV according to patient size, or  iterative reconstruction technique.    FINDINGS: There is a good contrast bolus within the pulmonary  arteries. No pulmonary arterial filling defects are demonstrated to  indicate pulmonary embolism. There is no evidence of aortic  dissection. Left coronary artery calcification is noted. There is a  moderate hiatal hernia. The mediastinum and eliezer are otherwise  unremarkable. Calcified granuloma is noted within the right lower  lobe. Several smaller indeterminate nodules are also noted in the  right upper lobe. The lungs are otherwise clear. No pleural effusion  or pneumothorax. Hepatic fatty infiltration is noted.        Impression     IMPRESSION:  1. No CT evidence of pulmonary embolism.  2. Old granulomatous disease.  3. Several tiny indeterminate right pulmonary nodules. If the patient  has risk factors for neoplastic disease, 12-month follow-up is  recommended.  4. Hiatal hernia.  5. Coronary artery calcification.  6. Hepatic fatty infiltration--possible  etiologies include consumption  of alcohol or excessive carbohydrate intake, especially  sugar/fructose.  Metabolic syndrome commonly occurs in combination  with nonalcoholic fatty liver disease. Although often reversible,  nonalcoholic fatty liver disease can progress to steatohepatitis and  cirrhosis.    FAUSTO ALVAREZ MD                Clinical Quality Measure: Blood Pressure Screening     Your blood pressure was checked while you were in the emergency department today. The last reading we obtained was  BP: 138/82 . Please read the guidelines below about what these numbers mean and what you should do about them.  If your systolic blood pressure (the top number) is less than 120 and your diastolic blood pressure (the bottom number) is less than 80, then your blood pressure is normal. There is nothing more that you need to do about it.  If your systolic blood pressure (the top number) is 120-139 or your diastolic blood pressure (the bottom number) is 80-89, your blood pressure may be higher than it should be. You should have your blood pressure rechecked within a year by a primary care provider.  If your systolic blood pressure (the top number) is 140 or greater or your diastolic blood pressure (the bottom number) is 90 or greater, you may have high blood pressure. High blood pressure is treatable, but if left untreated over time it can put you at risk for heart attack, stroke, or kidney failure. You should have your blood pressure rechecked by a primary care provider within the next 4 weeks.  If your provider in the emergency department today gave you specific instructions to follow-up with your doctor or provider even sooner than that, you should follow that instruction and not wait for up to 4 weeks for your follow-up visit.        Thank you for choosing [facility]       Thank you for choosing [facility] for your care. Our goal is always to provide you with excellent care. Hearing back from our patients is one way  "we can continue to improve our services. Please take a few minutes to complete the written survey that you may receive in the mail after you visit with us. Thank you!        [facility] Information     tibdit lets you send messages to your doctor, view your test results, renew your prescriptions, schedule appointments and more. To sign up, go to [email or web address] . Click on \"Log in\" on the left side of the screen, which will take you to the Welcome page. Then click on \"Sign up Now\" on the right side of the page.     You will be asked to enter the access code listed below, as well as some personal information. Please follow the directions to create your username and password.     Your access code is: [email or web address]  Expires: 2017 10:16 PM     Your access code will  in 90 days. If you need help or a new code, please call your [facility] clinic or [phone].        Care EveryWhere ID     This is your Care EveryWhere ID. This could be used by other organizations to access your [facility] medical records  [id number]        Equal Access to Services     HENRY SIBLEY : Hadrobert Russo, kentrell lowery, derrek leahy, kim wood. So [facility] [phone].    ATENCIÓN: Si habla español, tiene a rashid disposición servicios gratuitos de asistencia lingüística. Miriam al [phone].    We comply with applicable federal civil rights laws and [place] laws. We do not discriminate on the basis of race, color, national origin, age, disability sex, sexual orientation or gender identity.            After Visit Summary       This is your record. Keep this with you and show to your community pharmacist(s) and doctor(s) at your next visit.                  "

## 2017-09-22 NOTE — ED NOTES
Patient presents to ER for SOB, dizziness, and vomiting.  He reports it is all side effect from chemo that he has been on for 4 weeks and spoke with the oncology today was told to come to the ER. Nose is cyanotic. ABC's intact.

## 2017-09-23 NOTE — DISCHARGE INSTRUCTIONS
Pulmonary Nodules  Dizziness (Uncertain Cause)  Dizziness is a common symptom. It may be described as lightheadedness, spinning, or feeling like you are going to faint. Dizziness can have many causes.  Be sure to tell the healthcare provider about:    All medicines you take, including prescription, over-the-counter, herbs, and supplements    Any other symptoms you have    Any health problems you are being treated for    Anything that causes the dizziness to get worse or better  Today's exam did not show an exact cause for your dizziness. Other tests may be needed. Follow up with your healthcare provider.  Home care    Dizziness that occurs with sudden standing may be a sign of mild dehydration. Drink extra fluids for the next few days.    If you recently started a new medicine, stopped a medicine, or had the dose of a current medicine changed, talk with the prescribing healthcare provider. Your medicine plan may need adjustment.    If dizziness lasts more than a few seconds, sit or lie down until it passes. This may help prevent injury in case you pass out.    Do not drive or use power tools or dangerous equipment until you have had no dizziness for at least 48 hours.  Follow-up care  Follow up with your healthcare provider for further evaluation within the next 7 days or as advised.  When to seek medical advice  Call your healthcare provider for any of the following:    Worsening of symptoms or new symptoms    Passing out or seizure    Repeated vomiting    Headache    Palpitations (the sense that your heart is fluttering or beating fast or hard)    Shortness of breath    Blood in vomit or stool (black or red color)    Weakness of an arm or leg or one side of the face    Vision or hearing changes    Trouble walking or speaking    Chest, arm, neck, back, or jaw pain  Date Last Reviewed: 8/23/2015 2000-2017 [employer]. [address] [place] [address], [place] [address]. All rights reserved. This information  "is not intended as a substitute for professional medical care. Always follow your healthcare professional's instructions.         * VOMITING [6yr-Adult]  Vomiting is a common symptom that may be due to different causes. These include gastroenteritis (\"stomach-flu\"), food poisoning and gastritis. There are other more serious causes of vomiting which may be hard to diagnose early in the illness. Therefore, it is important to watch for the warning signs listed below.  The main danger from repeated vomiting is \"dehydration\". This is due to excess loss of water and minerals from the body. When this occurs, body fluids must be replaced.`  HOME CARE:    If symptoms are severe, rest at home for the next 24 hours.    You may use acetaminophen (Tylenol) 650-1000 mg every 6 hours to control fever, unless another medicine was prescribed. [ NOTE : If you have chronic liver disease, talk with your doctor before using acetaminophen.] (Aspirin should never be used in anyone under 18 years of age who is ill with a fever. It may cause severe liver damage.)    Avoid tobacco and alcohol use, which may worsen your symptoms.    If medicines for vomiting were prescribed, take as directed.  DURING THE FIRST 12-24 HOURS follow the diet below. Try to take frequent small sips even if you vomit occasionally:    FRUIT JUICES: Apple, grape juice, clear fruit drinks, electrolyte replacement and sports drinks.    BEVERAGES: Sport drinks such as Gatorade, soft drinks without caffeine; mineral water (plain or flavored), decaffeinated tea and coffee.    SOUPS: Clear broth, consommé and bouillon    DESSERTS: Plain gelatin (Jell-O), popsicles and fruit juice bars.  DURING THE NEXT 24 HOURS you may add the following to the above:    Hot cereal, plain toast, bread, rolls, crackers    Plain noodles, rice, mashed potatoes, chicken noodle or rice soup    Unsweetened canned fruit (avoid pineapple), bananas    Avoid dairy products    Limit caffeine and " chocolate. No spices or seasonings except salt.  DURING THE NEXT 24 HOURS  Slowly go back to a normal diet, as you feel better and your symptoms lessen.  FOLLOW UP with your doctor as advised if you are not improving over the next 2-3 days.  GET PROMPT MEDICAL ATTENTION if any of the following occur:    Constant abdominal pain that stays in the same spot or gets worse    Continued vomiting (unable to keep liquids down) for 24 hours    Frequent diarrhea (more than 5 times a day); blood (red or black color) in diarrhea    No urine output for 12 hours or extreme thirst    Weakness, dizziness or fainting    Unusually drowsy or confused    Fever over 101.0  F (38.3  C) for more than 3 days    Yellow color of the eyes or skin    4621-4290 [facility], [address] [place] [address], [place] [address]. All rights reserved. This information is not intended as a substitute for professional medical care. Always follow your healthcare professional's instructions.    A pulmonary nodule is small area of abnormal tissue in the lung. It is usually found on an X-ray taken for other reasons. It is a single spot (lesion) up to about an inch in size, surrounded by normal lung tissue.  Most nodules are not cancerous (benign). However, a nodule could be an early stage of lung cancer. Or it may be a sign of cancer that has spread from another part of the body. When a nodule is found on a chest X-ray, further testing is needed to determine if it is benign or cancerous (malignant). To give your healthcare provider more information about the nodule, you may have one or more of these tests:    Comparison of a new X-ray to earlier X-rays    Chest CT scan    Bronchoscopy (a procedure that allows the healthcare provider to see the air passages inside the lung)    Needle biopsy  Test results    If your nodule is benign, continued follow-up over the next 5 years is usually advised.    If tests do not determine whether your nodule is benign or  malignant, surgery may be advised.    If tests show that the nodule is definitely malignant, surgery will probably be advised.  The best survival rates from lung cancer occur when the original tumor is small (less than 1 inch). Follow your healthcare provider's advice on the timing of further testing. Prompt treatment gives the best chance of curing lung cancer.  Prevention  Smoking remains one of the biggest risk factors for lung cancer. If you smoke, it is essential that you quit to lower your risk of lung cancer. Talk to your healthcare provider about things that can help you quit, including medicines and support groups. See the following websites for more information:    www.smokefree.gov    www.quitnet.com  Home care  Most people with a pulmonary nodule have no symptoms. So no special home care is required. You may return to your usual activities and diet.  Follow-up care  Follow up with your healthcare provider, or as advised.  More information about lung cancer is available from these resources:    American Lung Association: 549.859.2766, www.lung.org    National Cancer [place]: 678.371.6103, www.cancer.gov  When to seek medical advice  Call your healthcare provider right away if any of these occur:    Fever of 100.4 F (38 C) or higher    Unintended weight change  Call 911  Contact emergency services right away if any of these occur:    Coughing up blood    Chest pain or shortness of breath  Date Last Reviewed: 9/13/2015 2000-2017 [employer]. [address] [place] [id number]. All rights reserved. This information is not intended as a substitute for professional medical care. Always follow your healthcare professional's instructions.        Shortness of Breath (Dyspnea)  Shortness of breath is the feeling that you can't catch your breath or get enough air. It is also known as dyspnea.  Dyspnea can be caused by many different conditions. They include:    Acute asthma  attack.    Worsening of chronic lung diseases such as chronic bronchitis and emphysema.    Heart failure. This is when weak heart muscle allows extra fluid to collect in the lungs.    Panic attacks or anxiety. Fear can cause rapid breathing (hyperventilation).    Pneumonia, or an infection in the lung tissue.    Exposure to toxic substances, fumes, smoke, or certain medicines.    Blood clot in the lung (pulmonary embolism). This is often from a piece of blood clot in a deep vein of the leg (deep vein thrombosis) that breaks off and travels to the lungs.    Heart attack or heart-related chest pain (angina).    Anemia.    Collapsed lung (pneumothorax).    Dehydration.    Pregnancy.  Based on your visit today, the exact cause of your shortness of breath is not certain. Your tests don t show any of the serious causes of dyspnea. You may need other tests to find out if you have a serious problem. It s important to watch for any new symptoms or symptoms that get worse. Follow up with your healthcare provider as directed.  Home care  Follow these tips to take care of yourself at home:    When your symptoms are better, go back to your usual activities.    If you smoke, you should stop. Join a quit-smoking program or ask your healthcare provider for help.    Eat a healthy diet and get plenty of sleep.    Get regular exercise. Talk with your healthcare provider before starting to exercise, especially if you have other medical problems.    Cut down on the amount of caffeine and stimulants you consume.  Follow-up care  Follow up with your healthcare provider, or as advised.  If tests were done, you will be told if your treatment needs to be changed. You can call as directed for the results.  (Note: If an X-ray was taken, a specialist will review it. You will be notified of any new findings that may affect your care.)  Call 911 or get immediate medical care  Shortness of breath may be a sign of a serious medical problem. For  example, it may be a problem with your heart or lungs. Call 911 if you have worsening shortness of breath or trouble breathing, especially with any of the symptoms below:    You are confused or it s difficult to wake you.    You faint or lose consciousness.    You have a fast heartbeat, or your heartbeat is irregular.    You are coughing up blood.    You have pain in your chest, arm, shoulder, neck, or upper back.    You break out in a sweat.  When to seek medical advice  Call your healthcare provider right away if any of these occur:    Slight shortness of breath or wheezing    Redness, pain or swelling in your leg, arm, or other body area    Swelling in both legs or ankles    Fast weight gain    Dizziness or weakness    Fever of 100.4 F (38 C) or higher, or as directed by your healthcare provider  Date Last Reviewed: 9/13/2015 2000-2017 [employer]. [address] [place] [address], [place] [address]. All rights reserved. This information is not intended as a substitute for professional medical care. Always follow your healthcare professional's instructions.

## 2017-09-24 LAB — INTERPRETATION ECG - MUSE: NORMAL

## 2017-10-30 ENCOUNTER — HOSPITAL ENCOUNTER (OUTPATIENT)
Dept: LAB | Facility: CLINIC | Age: 72
Discharge: HOME | End: 2017-10-30
Attending: THORACIC SURGERY (CARDIOTHORACIC VASCULAR SURGERY) | Admitting: THORACIC SURGERY (CARDIOTHORACIC VASCULAR SURGERY)
Payer: MEDICARE

## 2017-10-30 DIAGNOSIS — C15.9 ESOPHAGEAL CANCER (H): ICD-10-CM

## 2017-10-30 DIAGNOSIS — Z01.818 PRE-OP TESTING: Primary | ICD-10-CM

## 2017-10-30 DIAGNOSIS — Z01.83 ENCOUNTER FOR BLOOD TYPING: ICD-10-CM

## 2017-10-30 PROCEDURE — 86901 BLOOD TYPING SEROLOGIC RH(D): CPT | Performed by: THORACIC SURGERY (CARDIOTHORACIC VASCULAR SURGERY)

## 2017-10-30 PROCEDURE — 86850 RBC ANTIBODY SCREEN: CPT | Performed by: THORACIC SURGERY (CARDIOTHORACIC VASCULAR SURGERY)

## 2017-10-30 PROCEDURE — 36415 COLL VENOUS BLD VENIPUNCTURE: CPT | Performed by: THORACIC SURGERY (CARDIOTHORACIC VASCULAR SURGERY)

## 2017-10-30 PROCEDURE — 86900 BLOOD TYPING SEROLOGIC ABO: CPT | Performed by: THORACIC SURGERY (CARDIOTHORACIC VASCULAR SURGERY)

## 2017-11-07 ENCOUNTER — ANESTHESIA EVENT (OUTPATIENT)
Dept: SURGERY | Facility: CLINIC | Age: 72
DRG: 327 | End: 2017-11-07
Payer: MEDICARE

## 2017-11-07 ENCOUNTER — APPOINTMENT (OUTPATIENT)
Dept: GENERAL RADIOLOGY | Facility: CLINIC | Age: 72
DRG: 327 | End: 2017-11-07
Attending: THORACIC SURGERY (CARDIOTHORACIC VASCULAR SURGERY)
Payer: MEDICARE

## 2017-11-07 ENCOUNTER — ANESTHESIA (OUTPATIENT)
Dept: SURGERY | Facility: CLINIC | Age: 72
DRG: 327 | End: 2017-11-07
Payer: MEDICARE

## 2017-11-07 ENCOUNTER — HOSPITAL ENCOUNTER (INPATIENT)
Facility: CLINIC | Age: 72
LOS: 7 days | Discharge: HOME | DRG: 327 | End: 2017-11-14
Attending: THORACIC SURGERY (CARDIOTHORACIC VASCULAR SURGERY) | Admitting: THORACIC SURGERY (CARDIOTHORACIC VASCULAR SURGERY)
Payer: MEDICARE

## 2017-11-07 DIAGNOSIS — E87.6 HYPOKALEMIA: Primary | ICD-10-CM

## 2017-11-07 DIAGNOSIS — C15.5 PRIMARY ADENOCARCINOMA OF DISTAL THIRD OF ESOPHAGUS (H): ICD-10-CM

## 2017-11-07 LAB
ABO + RH BLD: NORMAL
ABO + RH BLD: NORMAL
ANION GAP SERPL CALCULATED.3IONS-SCNC: 6 MMOL/L (ref 3–14)
BLD GP AB SCN SERPL QL: NORMAL
BLOOD BANK CMNT PATIENT-IMP: NORMAL
BUN SERPL-MCNC: 28 MG/DL (ref 7–30)
CALCIUM SERPL-MCNC: 9.6 MG/DL (ref 8.5–10.1)
CHLORIDE SERPL-SCNC: 105 MMOL/L (ref 94–109)
CO2 SERPL-SCNC: 26 MMOL/L (ref 20–32)
CREAT SERPL-MCNC: 1.53 MG/DL (ref 0.66–1.25)
ERYTHROCYTE [DISTWIDTH] IN BLOOD BY AUTOMATED COUNT: 19.7 % (ref 10–15)
GFR SERPL CREATININE-BSD FRML MDRD: 45 ML/MIN/1.7M2
GLUCOSE BLDC GLUCOMTR-MCNC: 179 MG/DL (ref 70–99)
GLUCOSE BLDC GLUCOMTR-MCNC: 197 MG/DL (ref 70–99)
GLUCOSE SERPL-MCNC: 137 MG/DL (ref 70–99)
HCT VFR BLD AUTO: 31.5 % (ref 40–53)
HGB BLD-MCNC: 11.1 G/DL (ref 13.3–17.7)
INR PPP: 0.9 (ref 0.86–1.14)
MCH RBC QN AUTO: 34.4 PG (ref 26.5–33)
MCHC RBC AUTO-ENTMCNC: 35.2 G/DL (ref 31.5–36.5)
MCV RBC AUTO: 98 FL (ref 78–100)
PLATELET # BLD AUTO: 222 10E9/L (ref 150–450)
POTASSIUM SERPL-SCNC: 3.8 MMOL/L (ref 3.4–5.3)
RBC # BLD AUTO: 3.23 10E12/L (ref 4.4–5.9)
SODIUM SERPL-SCNC: 137 MMOL/L (ref 133–144)
SPECIMEN EXP DATE BLD: NORMAL
WBC # BLD AUTO: 5.2 10E9/L (ref 4–11)

## 2017-11-07 PROCEDURE — 25000125 ZZHC RX 250: Performed by: PHYSICIAN ASSISTANT

## 2017-11-07 PROCEDURE — 85027 COMPLETE CBC AUTOMATED: CPT | Performed by: THORACIC SURGERY (CARDIOTHORACIC VASCULAR SURGERY)

## 2017-11-07 PROCEDURE — 0DB60ZZ EXCISION OF STOMACH, OPEN APPROACH: ICD-10-PCS | Performed by: THORACIC SURGERY (CARDIOTHORACIC VASCULAR SURGERY)

## 2017-11-07 PROCEDURE — 25000128 H RX IP 250 OP 636: Performed by: PHYSICIAN ASSISTANT

## 2017-11-07 PROCEDURE — 25000128 H RX IP 250 OP 636: Performed by: THORACIC SURGERY (CARDIOTHORACIC VASCULAR SURGERY)

## 2017-11-07 PROCEDURE — 21400002 ZZH R&B CCU CICU CRITICAL

## 2017-11-07 PROCEDURE — 25000128 H RX IP 250 OP 636: Performed by: SURGERY

## 2017-11-07 PROCEDURE — 40000884 ZZH STATISTIC STEP DOWN HRS NIGHT

## 2017-11-07 PROCEDURE — 27211024 ZZHC OR SUPPLY OTHER OPNP: Performed by: THORACIC SURGERY (CARDIOTHORACIC VASCULAR SURGERY)

## 2017-11-07 PROCEDURE — 25000128 H RX IP 250 OP 636: Performed by: NURSE ANESTHETIST, CERTIFIED REGISTERED

## 2017-11-07 PROCEDURE — 27210794 ZZH OR GENERAL SUPPLY STERILE: Performed by: THORACIC SURGERY (CARDIOTHORACIC VASCULAR SURGERY)

## 2017-11-07 PROCEDURE — 25000125 ZZHC RX 250: Performed by: THORACIC SURGERY (CARDIOTHORACIC VASCULAR SURGERY)

## 2017-11-07 PROCEDURE — 88309 TISSUE EXAM BY PATHOLOGIST: CPT | Performed by: THORACIC SURGERY (CARDIOTHORACIC VASCULAR SURGERY)

## 2017-11-07 PROCEDURE — 85610 PROTHROMBIN TIME: CPT | Performed by: THORACIC SURGERY (CARDIOTHORACIC VASCULAR SURGERY)

## 2017-11-07 PROCEDURE — 0DHA0UZ INSERTION OF FEEDING DEVICE INTO JEJUNUM, OPEN APPROACH: ICD-10-PCS | Performed by: THORACIC SURGERY (CARDIOTHORACIC VASCULAR SURGERY)

## 2017-11-07 PROCEDURE — 37000009 ZZH ANESTHESIA TECHNICAL FEE, EACH ADDTL 15 MIN: Performed by: THORACIC SURGERY (CARDIOTHORACIC VASCULAR SURGERY)

## 2017-11-07 PROCEDURE — 37000008 ZZH ANESTHESIA TECHNICAL FEE, 1ST 30 MIN: Performed by: THORACIC SURGERY (CARDIOTHORACIC VASCULAR SURGERY)

## 2017-11-07 PROCEDURE — 27210995 ZZH RX 272: Performed by: THORACIC SURGERY (CARDIOTHORACIC VASCULAR SURGERY)

## 2017-11-07 PROCEDURE — 36000069 ZZH SURGERY LEVEL 5 EA 15 ADDTL MIN: Performed by: THORACIC SURGERY (CARDIOTHORACIC VASCULAR SURGERY)

## 2017-11-07 PROCEDURE — 0DB30ZZ EXCISION OF LOWER ESOPHAGUS, OPEN APPROACH: ICD-10-PCS | Performed by: THORACIC SURGERY (CARDIOTHORACIC VASCULAR SURGERY)

## 2017-11-07 PROCEDURE — S0020 INJECTION, BUPIVICAINE HYDRO: HCPCS | Performed by: THORACIC SURGERY (CARDIOTHORACIC VASCULAR SURGERY)

## 2017-11-07 PROCEDURE — 40000169 ZZH STATISTIC PRE-PROCEDURE ASSESSMENT I: Performed by: THORACIC SURGERY (CARDIOTHORACIC VASCULAR SURGERY)

## 2017-11-07 PROCEDURE — 00000146 ZZHCL STATISTIC GLUCOSE BY METER IP

## 2017-11-07 PROCEDURE — 71000012 ZZH RECOVERY PHASE 1 LEVEL 1 FIRST HR: Performed by: THORACIC SURGERY (CARDIOTHORACIC VASCULAR SURGERY)

## 2017-11-07 PROCEDURE — 25000125 ZZHC RX 250: Performed by: NURSE ANESTHETIST, CERTIFIED REGISTERED

## 2017-11-07 PROCEDURE — 71000013 ZZH RECOVERY PHASE 1 LEVEL 1 EA ADDTL HR: Performed by: THORACIC SURGERY (CARDIOTHORACIC VASCULAR SURGERY)

## 2017-11-07 PROCEDURE — 80048 BASIC METABOLIC PNL TOTAL CA: CPT | Performed by: THORACIC SURGERY (CARDIOTHORACIC VASCULAR SURGERY)

## 2017-11-07 PROCEDURE — 0BQT0ZZ REPAIR DIAPHRAGM, OPEN APPROACH: ICD-10-PCS | Performed by: THORACIC SURGERY (CARDIOTHORACIC VASCULAR SURGERY)

## 2017-11-07 PROCEDURE — 40000885 ZZH STATISTIC STEP DOWN HRS EVENING

## 2017-11-07 PROCEDURE — 25000131 ZZH RX MED GY IP 250 OP 636 PS 637: Mod: GY | Performed by: NURSE PRACTITIONER

## 2017-11-07 PROCEDURE — 36415 COLL VENOUS BLD VENIPUNCTURE: CPT | Performed by: THORACIC SURGERY (CARDIOTHORACIC VASCULAR SURGERY)

## 2017-11-07 PROCEDURE — 25000128 H RX IP 250 OP 636: Performed by: ANESTHESIOLOGY

## 2017-11-07 PROCEDURE — 99222 1ST HOSP IP/OBS MODERATE 55: CPT | Performed by: NURSE PRACTITIONER

## 2017-11-07 PROCEDURE — 99207 ZZC CONSULT E&M CHANGED TO INITIAL LEVEL: CPT | Performed by: NURSE PRACTITIONER

## 2017-11-07 PROCEDURE — 36000067 ZZH SURGERY LEVEL 5 1ST 30 MIN: Performed by: THORACIC SURGERY (CARDIOTHORACIC VASCULAR SURGERY)

## 2017-11-07 PROCEDURE — 40000986 XR CHEST PORT 1 VW

## 2017-11-07 PROCEDURE — 0D870ZZ DIVISION OF STOMACH, PYLORUS, OPEN APPROACH: ICD-10-PCS | Performed by: THORACIC SURGERY (CARDIOTHORACIC VASCULAR SURGERY)

## 2017-11-07 PROCEDURE — 88309 TISSUE EXAM BY PATHOLOGIST: CPT | Mod: 26 | Performed by: THORACIC SURGERY (CARDIOTHORACIC VASCULAR SURGERY)

## 2017-11-07 PROCEDURE — 25000566 ZZH SEVOFLURANE, EA 15 MIN: Performed by: THORACIC SURGERY (CARDIOTHORACIC VASCULAR SURGERY)

## 2017-11-07 RX ORDER — LABETALOL HYDROCHLORIDE 5 MG/ML
10 INJECTION, SOLUTION INTRAVENOUS
Status: DISCONTINUED | OUTPATIENT
Start: 2017-11-07 | End: 2017-11-14 | Stop reason: HOSPADM

## 2017-11-07 RX ORDER — POTASSIUM CHLORIDE 29.8 MG/ML
20 INJECTION INTRAVENOUS
Status: DISCONTINUED | OUTPATIENT
Start: 2017-11-07 | End: 2017-11-07

## 2017-11-07 RX ORDER — BUPIVACAINE HYDROCHLORIDE 5 MG/ML
INJECTION, SOLUTION PERINEURAL PRN
Status: DISCONTINUED | OUTPATIENT
Start: 2017-11-07 | End: 2017-11-07 | Stop reason: HOSPADM

## 2017-11-07 RX ORDER — POTASSIUM CHLORIDE 1500 MG/1
20-40 TABLET, EXTENDED RELEASE ORAL
Status: DISCONTINUED | OUTPATIENT
Start: 2017-11-07 | End: 2017-11-14 | Stop reason: HOSPADM

## 2017-11-07 RX ORDER — FENTANYL CITRATE 50 UG/ML
INJECTION, SOLUTION INTRAMUSCULAR; INTRAVENOUS PRN
Status: DISCONTINUED | OUTPATIENT
Start: 2017-11-07 | End: 2017-11-07

## 2017-11-07 RX ORDER — ONDANSETRON 2 MG/ML
4 INJECTION INTRAMUSCULAR; INTRAVENOUS EVERY 6 HOURS PRN
Status: DISCONTINUED | OUTPATIENT
Start: 2017-11-07 | End: 2017-11-14 | Stop reason: HOSPADM

## 2017-11-07 RX ORDER — SODIUM CHLORIDE, SODIUM LACTATE, POTASSIUM CHLORIDE, CALCIUM CHLORIDE 600; 310; 30; 20 MG/100ML; MG/100ML; MG/100ML; MG/100ML
INJECTION, SOLUTION INTRAVENOUS CONTINUOUS
Status: DISCONTINUED | OUTPATIENT
Start: 2017-11-07 | End: 2017-11-07

## 2017-11-07 RX ORDER — HYDRALAZINE HYDROCHLORIDE 20 MG/ML
10 INJECTION INTRAMUSCULAR; INTRAVENOUS EVERY 4 HOURS PRN
Status: DISCONTINUED | OUTPATIENT
Start: 2017-11-07 | End: 2017-11-14 | Stop reason: HOSPADM

## 2017-11-07 RX ORDER — GINSENG 100 MG
CAPSULE ORAL 3 TIMES DAILY
Status: DISCONTINUED | OUTPATIENT
Start: 2017-11-07 | End: 2017-11-13

## 2017-11-07 RX ORDER — ONDANSETRON 4 MG/1
4 TABLET, ORALLY DISINTEGRATING ORAL EVERY 30 MIN PRN
Status: DISCONTINUED | OUTPATIENT
Start: 2017-11-07 | End: 2017-11-07

## 2017-11-07 RX ORDER — HYDRALAZINE HYDROCHLORIDE 20 MG/ML
2.5-5 INJECTION INTRAMUSCULAR; INTRAVENOUS EVERY 10 MIN PRN
Status: DISCONTINUED | OUTPATIENT
Start: 2017-11-07 | End: 2017-11-07 | Stop reason: HOSPADM

## 2017-11-07 RX ORDER — ONDANSETRON 4 MG/1
4 TABLET, ORALLY DISINTEGRATING ORAL EVERY 30 MIN PRN
Status: DISCONTINUED | OUTPATIENT
Start: 2017-11-07 | End: 2017-11-07 | Stop reason: HOSPADM

## 2017-11-07 RX ORDER — FENTANYL CITRATE 0.05 MG/ML
25-50 INJECTION, SOLUTION INTRAMUSCULAR; INTRAVENOUS EVERY 5 MIN PRN
Status: DISCONTINUED | OUTPATIENT
Start: 2017-11-07 | End: 2017-11-07 | Stop reason: HOSPADM

## 2017-11-07 RX ORDER — MAGNESIUM HYDROXIDE 1200 MG/15ML
LIQUID ORAL PRN
Status: DISCONTINUED | OUTPATIENT
Start: 2017-11-07 | End: 2017-11-07 | Stop reason: HOSPADM

## 2017-11-07 RX ORDER — CEFAZOLIN SODIUM 2 G/100ML
2 INJECTION, SOLUTION INTRAVENOUS
Status: COMPLETED | OUTPATIENT
Start: 2017-11-07 | End: 2017-11-07

## 2017-11-07 RX ORDER — DEXTROSE MONOHYDRATE 25 G/50ML
25-50 INJECTION, SOLUTION INTRAVENOUS
Status: DISCONTINUED | OUTPATIENT
Start: 2017-11-07 | End: 2017-11-14 | Stop reason: HOSPADM

## 2017-11-07 RX ORDER — SODIUM CHLORIDE, SODIUM LACTATE, POTASSIUM CHLORIDE, CALCIUM CHLORIDE 600; 310; 30; 20 MG/100ML; MG/100ML; MG/100ML; MG/100ML
INJECTION, SOLUTION INTRAVENOUS CONTINUOUS
Status: DISCONTINUED | OUTPATIENT
Start: 2017-11-07 | End: 2017-11-07 | Stop reason: HOSPADM

## 2017-11-07 RX ORDER — LIDOCAINE HYDROCHLORIDE 20 MG/ML
INJECTION, SOLUTION INFILTRATION; PERINEURAL PRN
Status: DISCONTINUED | OUTPATIENT
Start: 2017-11-07 | End: 2017-11-07

## 2017-11-07 RX ORDER — VECURONIUM BROMIDE 1 MG/ML
INJECTION, POWDER, LYOPHILIZED, FOR SOLUTION INTRAVENOUS PRN
Status: DISCONTINUED | OUTPATIENT
Start: 2017-11-07 | End: 2017-11-07

## 2017-11-07 RX ORDER — GLYCOPYRROLATE 0.2 MG/ML
INJECTION, SOLUTION INTRAMUSCULAR; INTRAVENOUS PRN
Status: DISCONTINUED | OUTPATIENT
Start: 2017-11-07 | End: 2017-11-07

## 2017-11-07 RX ORDER — NITROGLYCERIN 0.4 MG/1
0.4 TABLET SUBLINGUAL EVERY 5 MIN PRN
Status: DISCONTINUED | OUTPATIENT
Start: 2017-11-07 | End: 2017-11-08

## 2017-11-07 RX ORDER — LIDOCAINE 40 MG/G
CREAM TOPICAL
Status: DISCONTINUED | OUTPATIENT
Start: 2017-11-07 | End: 2017-11-11

## 2017-11-07 RX ORDER — DIPHENHYDRAMINE HYDROCHLORIDE 50 MG/ML
12.5 INJECTION INTRAMUSCULAR; INTRAVENOUS EVERY 6 HOURS PRN
Status: DISCONTINUED | OUTPATIENT
Start: 2017-11-07 | End: 2017-11-07

## 2017-11-07 RX ORDER — DIPHENHYDRAMINE HYDROCHLORIDE 50 MG/ML
12.5 INJECTION INTRAMUSCULAR; INTRAVENOUS EVERY 6 HOURS PRN
Status: DISCONTINUED | OUTPATIENT
Start: 2017-11-07 | End: 2017-11-14 | Stop reason: HOSPADM

## 2017-11-07 RX ORDER — MEPERIDINE HYDROCHLORIDE 25 MG/ML
12.5 INJECTION INTRAMUSCULAR; INTRAVENOUS; SUBCUTANEOUS
Status: DISCONTINUED | OUTPATIENT
Start: 2017-11-07 | End: 2017-11-07

## 2017-11-07 RX ORDER — DIPHENHYDRAMINE HCL 12.5MG/5ML
12.5 LIQUID (ML) ORAL EVERY 6 HOURS PRN
Status: DISCONTINUED | OUTPATIENT
Start: 2017-11-07 | End: 2017-11-07

## 2017-11-07 RX ORDER — EPHEDRINE SULFATE 50 MG/ML
INJECTION, SOLUTION INTRAMUSCULAR; INTRAVENOUS; SUBCUTANEOUS PRN
Status: DISCONTINUED | OUTPATIENT
Start: 2017-11-07 | End: 2017-11-07

## 2017-11-07 RX ORDER — POTASSIUM CHLORIDE 7.45 MG/ML
10 INJECTION INTRAVENOUS
Status: DISCONTINUED | OUTPATIENT
Start: 2017-11-07 | End: 2017-11-14 | Stop reason: HOSPADM

## 2017-11-07 RX ORDER — FENTANYL CITRATE 0.05 MG/ML
25-50 INJECTION, SOLUTION INTRAMUSCULAR; INTRAVENOUS
Status: DISCONTINUED | OUTPATIENT
Start: 2017-11-07 | End: 2017-11-07 | Stop reason: HOSPADM

## 2017-11-07 RX ORDER — ONDANSETRON 4 MG/1
4 TABLET, ORALLY DISINTEGRATING ORAL EVERY 6 HOURS PRN
Status: DISCONTINUED | OUTPATIENT
Start: 2017-11-07 | End: 2017-11-14 | Stop reason: HOSPADM

## 2017-11-07 RX ORDER — PROCHLORPERAZINE MALEATE 5 MG
5 TABLET ORAL EVERY 6 HOURS PRN
Status: DISCONTINUED | OUTPATIENT
Start: 2017-11-07 | End: 2017-11-14 | Stop reason: HOSPADM

## 2017-11-07 RX ORDER — NEOSTIGMINE METHYLSULFATE 1 MG/ML
VIAL (ML) INJECTION PRN
Status: DISCONTINUED | OUTPATIENT
Start: 2017-11-07 | End: 2017-11-07

## 2017-11-07 RX ORDER — DIPHENHYDRAMINE HCL 12.5MG/5ML
12.5 LIQUID (ML) ORAL EVERY 6 HOURS PRN
Status: DISCONTINUED | OUTPATIENT
Start: 2017-11-07 | End: 2017-11-14 | Stop reason: HOSPADM

## 2017-11-07 RX ORDER — CEFAZOLIN SODIUM 1 G/3ML
1 INJECTION, POWDER, FOR SOLUTION INTRAMUSCULAR; INTRAVENOUS SEE ADMIN INSTRUCTIONS
Status: DISCONTINUED | OUTPATIENT
Start: 2017-11-07 | End: 2017-11-07 | Stop reason: HOSPADM

## 2017-11-07 RX ORDER — POTASSIUM CL/LIDO/0.9 % NACL 10MEQ/0.1L
10 INTRAVENOUS SOLUTION, PIGGYBACK (ML) INTRAVENOUS
Status: DISCONTINUED | OUTPATIENT
Start: 2017-11-07 | End: 2017-11-14 | Stop reason: HOSPADM

## 2017-11-07 RX ORDER — SODIUM CHLORIDE 9 MG/ML
INJECTION, SOLUTION INTRAVENOUS CONTINUOUS PRN
Status: DISCONTINUED | OUTPATIENT
Start: 2017-11-07 | End: 2017-11-07

## 2017-11-07 RX ORDER — HYDROMORPHONE HYDROCHLORIDE 1 MG/ML
.3-.5 INJECTION, SOLUTION INTRAMUSCULAR; INTRAVENOUS; SUBCUTANEOUS EVERY 5 MIN PRN
Status: DISCONTINUED | OUTPATIENT
Start: 2017-11-07 | End: 2017-11-07 | Stop reason: HOSPADM

## 2017-11-07 RX ORDER — POTASSIUM CHLORIDE 1.5 G/1.58G
20-40 POWDER, FOR SOLUTION ORAL
Status: DISCONTINUED | OUTPATIENT
Start: 2017-11-07 | End: 2017-11-14 | Stop reason: HOSPADM

## 2017-11-07 RX ORDER — PROPOFOL 10 MG/ML
INJECTION, EMULSION INTRAVENOUS PRN
Status: DISCONTINUED | OUTPATIENT
Start: 2017-11-07 | End: 2017-11-07

## 2017-11-07 RX ORDER — ONDANSETRON 2 MG/ML
4 INJECTION INTRAMUSCULAR; INTRAVENOUS EVERY 30 MIN PRN
Status: DISCONTINUED | OUTPATIENT
Start: 2017-11-07 | End: 2017-11-07 | Stop reason: HOSPADM

## 2017-11-07 RX ORDER — NALOXONE HYDROCHLORIDE 0.4 MG/ML
.1-.4 INJECTION, SOLUTION INTRAMUSCULAR; INTRAVENOUS; SUBCUTANEOUS
Status: DISCONTINUED | OUTPATIENT
Start: 2017-11-07 | End: 2017-11-14 | Stop reason: HOSPADM

## 2017-11-07 RX ORDER — MAGNESIUM SULFATE HEPTAHYDRATE 40 MG/ML
4 INJECTION, SOLUTION INTRAVENOUS EVERY 4 HOURS PRN
Status: DISCONTINUED | OUTPATIENT
Start: 2017-11-07 | End: 2017-11-14 | Stop reason: HOSPADM

## 2017-11-07 RX ORDER — NALOXONE HYDROCHLORIDE 0.4 MG/ML
.1-.4 INJECTION, SOLUTION INTRAMUSCULAR; INTRAVENOUS; SUBCUTANEOUS
Status: DISCONTINUED | OUTPATIENT
Start: 2017-11-07 | End: 2017-11-07

## 2017-11-07 RX ORDER — METOPROLOL TARTRATE 1 MG/ML
5 INJECTION, SOLUTION INTRAVENOUS EVERY 6 HOURS
Status: DISCONTINUED | OUTPATIENT
Start: 2017-11-08 | End: 2017-11-11

## 2017-11-07 RX ORDER — ONDANSETRON 2 MG/ML
4 INJECTION INTRAMUSCULAR; INTRAVENOUS EVERY 30 MIN PRN
Status: DISCONTINUED | OUTPATIENT
Start: 2017-11-07 | End: 2017-11-07

## 2017-11-07 RX ORDER — LIDOCAINE 40 MG/G
CREAM TOPICAL
Status: DISCONTINUED | OUTPATIENT
Start: 2017-11-07 | End: 2017-11-14 | Stop reason: HOSPADM

## 2017-11-07 RX ORDER — ONDANSETRON 2 MG/ML
INJECTION INTRAMUSCULAR; INTRAVENOUS PRN
Status: DISCONTINUED | OUTPATIENT
Start: 2017-11-07 | End: 2017-11-07

## 2017-11-07 RX ORDER — NICOTINE POLACRILEX 4 MG
15-30 LOZENGE BUCCAL
Status: DISCONTINUED | OUTPATIENT
Start: 2017-11-07 | End: 2017-11-14 | Stop reason: HOSPADM

## 2017-11-07 RX ORDER — SODIUM CHLORIDE, SODIUM LACTATE, POTASSIUM CHLORIDE, CALCIUM CHLORIDE 600; 310; 30; 20 MG/100ML; MG/100ML; MG/100ML; MG/100ML
INJECTION, SOLUTION INTRAVENOUS CONTINUOUS
Status: DISCONTINUED | OUTPATIENT
Start: 2017-11-07 | End: 2017-11-14

## 2017-11-07 RX ADMIN — PROPOFOL 180 MG: 10 INJECTION, EMULSION INTRAVENOUS at 11:37

## 2017-11-07 RX ADMIN — BACITRACIN: 500 OINTMENT TOPICAL at 21:58

## 2017-11-07 RX ADMIN — FENTANYL CITRATE 50 MCG: 50 INJECTION, SOLUTION INTRAMUSCULAR; INTRAVENOUS at 13:35

## 2017-11-07 RX ADMIN — GLYCOPYRROLATE 1 MG: 0.2 INJECTION, SOLUTION INTRAMUSCULAR; INTRAVENOUS at 15:45

## 2017-11-07 RX ADMIN — Medication 10 MG: at 11:50

## 2017-11-07 RX ADMIN — VECURONIUM BROMIDE 1 MG: 1 INJECTION, POWDER, LYOPHILIZED, FOR SOLUTION INTRAVENOUS at 14:30

## 2017-11-07 RX ADMIN — SUCCINYLCHOLINE CHLORIDE 100 MG: 20 INJECTION, SOLUTION INTRAMUSCULAR; INTRAVENOUS at 11:37

## 2017-11-07 RX ADMIN — VECURONIUM BROMIDE 1 MG: 1 INJECTION, POWDER, LYOPHILIZED, FOR SOLUTION INTRAVENOUS at 15:00

## 2017-11-07 RX ADMIN — VECURONIUM BROMIDE 2 MG: 1 INJECTION, POWDER, LYOPHILIZED, FOR SOLUTION INTRAVENOUS at 14:00

## 2017-11-07 RX ADMIN — VECURONIUM BROMIDE 2 MG: 1 INJECTION, POWDER, LYOPHILIZED, FOR SOLUTION INTRAVENOUS at 12:15

## 2017-11-07 RX ADMIN — NEOSTIGMINE METHYLSULFATE 5 MG: 1 INJECTION INTRAMUSCULAR; INTRAVENOUS; SUBCUTANEOUS at 15:45

## 2017-11-07 RX ADMIN — INSULIN ASPART 2 UNITS: 100 INJECTION, SOLUTION INTRAVENOUS; SUBCUTANEOUS at 21:05

## 2017-11-07 RX ADMIN — VECURONIUM BROMIDE 2 MG: 1 INJECTION, POWDER, LYOPHILIZED, FOR SOLUTION INTRAVENOUS at 13:00

## 2017-11-07 RX ADMIN — VECURONIUM BROMIDE 5 MG: 1 INJECTION, POWDER, LYOPHILIZED, FOR SOLUTION INTRAVENOUS at 11:45

## 2017-11-07 RX ADMIN — PHENYLEPHRINE HYDROCHLORIDE 100 MCG: 10 INJECTION INTRAVENOUS at 13:55

## 2017-11-07 RX ADMIN — FENTANYL CITRATE 50 MCG: 50 INJECTION, SOLUTION INTRAMUSCULAR; INTRAVENOUS at 12:02

## 2017-11-07 RX ADMIN — HYDROMORPHONE HYDROCHLORIDE 0.5 MG: 1 INJECTION, SOLUTION INTRAMUSCULAR; INTRAVENOUS; SUBCUTANEOUS at 13:03

## 2017-11-07 RX ADMIN — VECURONIUM BROMIDE 1 MG: 1 INJECTION, POWDER, LYOPHILIZED, FOR SOLUTION INTRAVENOUS at 12:30

## 2017-11-07 RX ADMIN — SODIUM CHLORIDE: 9 INJECTION, SOLUTION INTRAVENOUS at 14:09

## 2017-11-07 RX ADMIN — HYDROMORPHONE HYDROCHLORIDE 0.5 MG: 1 INJECTION, SOLUTION INTRAMUSCULAR; INTRAVENOUS; SUBCUTANEOUS at 13:38

## 2017-11-07 RX ADMIN — SODIUM CHLORIDE, POTASSIUM CHLORIDE, SODIUM LACTATE AND CALCIUM CHLORIDE: 600; 310; 30; 20 INJECTION, SOLUTION INTRAVENOUS at 10:57

## 2017-11-07 RX ADMIN — CEFAZOLIN SODIUM 2 G: 2 INJECTION, SOLUTION INTRAVENOUS at 11:45

## 2017-11-07 RX ADMIN — SODIUM CHLORIDE, POTASSIUM CHLORIDE, SODIUM LACTATE AND CALCIUM CHLORIDE: 600; 310; 30; 20 INJECTION, SOLUTION INTRAVENOUS at 20:24

## 2017-11-07 RX ADMIN — PHENYLEPHRINE HYDROCHLORIDE 0.25 MCG/KG/MIN: 10 INJECTION, SOLUTION INTRAMUSCULAR; INTRAVENOUS; SUBCUTANEOUS at 13:55

## 2017-11-07 RX ADMIN — HYDROMORPHONE HYDROCHLORIDE 0.5 MG: 1 INJECTION, SOLUTION INTRAMUSCULAR; INTRAVENOUS; SUBCUTANEOUS at 16:44

## 2017-11-07 RX ADMIN — Medication 5 MG: at 13:53

## 2017-11-07 RX ADMIN — VECURONIUM BROMIDE 2 MG: 1 INJECTION, POWDER, LYOPHILIZED, FOR SOLUTION INTRAVENOUS at 13:15

## 2017-11-07 RX ADMIN — Medication 5 MG: at 13:58

## 2017-11-07 RX ADMIN — LIDOCAINE HYDROCHLORIDE 80 MG: 20 INJECTION, SOLUTION INFILTRATION; PERINEURAL at 11:37

## 2017-11-07 RX ADMIN — SODIUM CHLORIDE: 9 INJECTION, SOLUTION INTRAVENOUS at 11:43

## 2017-11-07 RX ADMIN — HYDROMORPHONE HYDROCHLORIDE: 10 INJECTION, SOLUTION INTRAMUSCULAR; INTRAVENOUS; SUBCUTANEOUS at 18:59

## 2017-11-07 RX ADMIN — HYDROMORPHONE HYDROCHLORIDE 0.5 MG: 1 INJECTION, SOLUTION INTRAMUSCULAR; INTRAVENOUS; SUBCUTANEOUS at 16:31

## 2017-11-07 RX ADMIN — FENTANYL CITRATE 100 MCG: 50 INJECTION, SOLUTION INTRAMUSCULAR; INTRAVENOUS at 11:37

## 2017-11-07 RX ADMIN — VECURONIUM BROMIDE 2 MG: 1 INJECTION, POWDER, LYOPHILIZED, FOR SOLUTION INTRAVENOUS at 12:05

## 2017-11-07 RX ADMIN — MIDAZOLAM HYDROCHLORIDE 2 MG: 1 INJECTION, SOLUTION INTRAMUSCULAR; INTRAVENOUS at 11:28

## 2017-11-07 RX ADMIN — CEFAZOLIN SODIUM 1 G: 2 INJECTION, SOLUTION INTRAVENOUS at 13:45

## 2017-11-07 RX ADMIN — ONDANSETRON 4 MG: 2 INJECTION INTRAMUSCULAR; INTRAVENOUS at 15:19

## 2017-11-07 ASSESSMENT — PAIN DESCRIPTION - DESCRIPTORS
DESCRIPTORS: BURNING
DESCRIPTORS: BURNING
DESCRIPTORS: ACHING

## 2017-11-07 ASSESSMENT — ENCOUNTER SYMPTOMS: SEIZURES: 0

## 2017-11-07 ASSESSMENT — LIFESTYLE VARIABLES: TOBACCO_USE: 1

## 2017-11-07 NOTE — PROGRESS NOTES
Admission medication history interview status for the 11/7/2017  admission is complete. See EPIC admission navigator for prior to admission medications     Medication history source reliability:Good    Medication history interview source(s):Patient    Medication history resources (including written lists, pill bottles, clinic record):None    Primary pharmacy.[facility]    Additional medication history information not noted on PTA med list :None    Time spent in this activity: 45 minutes    Prior to Admission medications    Medication Sig Last Dose Taking? Auth Provider   GLIPIZIDE ER PO Take 5 mg by mouth daily 11/6/2017 at Unknown time Yes Reported, Patient   ZOLPIDEM TARTRATE PO Take 5-10 mg by mouth nightly as needed for sleep 11/6/2017 at prn Yes Reported, Patient   atenolol-chlorthalidone (TENORETIC) 100-25 MG per tablet Take 1 tablet by mouth daily 11/6/2017 at Unknown time Yes Reported, Patient   METFORMIN HCL PO Take 1,000 mg by mouth daily  11/6/2017 at Unknown time Yes Reported, Patient   LISINOPRIL PO Take 10 mg by mouth daily  11/6/2017 at Unknown time Yes Reported, Patient   ALLOPURINOL PO Take 300 mg by mouth daily  11/6/2017 at Unknown time Yes Reported, Patient   Rosuvastatin Calcium (CRESTOR PO) Take 40 mg by mouth daily  11/6/2017 at Unknown time Yes Reported, Patient   VENLAFAXINE HCL PO Take 75 mg by mouth daily (Patient taking differently than prescribed; Prescribed as 75mg twice a day) 11/6/2017 at Unknown time Yes Reported, Patient   OMEPRAZOLE PO Take 20 mg by mouth  11/6/2017 at Unknown time Yes Reported, Patient   ASPIRIN PO Take 81 mg by mouth daily as needed  more than a month Yes Reported, Patient

## 2017-11-07 NOTE — BRIEF OP NOTE
[facility] Brief Operative Note    Pre-operative diagnosis: ADENOCARCINOMA DISTAL ESOPHAGUS   Post-operative diagnosis Adenocarcinoma distal esophagus   Procedure: Procedure(s):  MARILEE BROOKS ESOPHAGOGASTRECTOMY, PLACEMENT OF FEEDING TUBE JEJUNOSOTMY, PYLOROMYOTOMY, LIGATION THORACIC DUCT - Wound Class: II-Clean Contaminated   Surgeon(s): Surgeon(s) and Role:     * Maximilian Jeter MD - Primary     * Elaine Su PA-C - Assisting   Estimated blood loss: 200 mL    Specimens:   ID Type Source Tests Collected by Time Destination   A : DISTAL 3/4 ESOPHAGUS, PROXIMAL 1/3 STOMACH Tissue Other SURGICAL PATHOLOGY EXAM Maximilian Jeter MD 11/7/2017  2:38 PM       Findings: Await final pathology

## 2017-11-07 NOTE — IP AVS SNAPSHOT
[facility] [facility]    [address] [place] [address]    Phone:  [phone]                                       After Visit Summary   11/7/2017    aRmon Winter    MRN: [id number]           After Visit Summary Signature Page     I have received my discharge instructions, and my questions have been answered. I have discussed any challenges I see with this plan with the nurse or doctor.    ..........................................................................................................................................  Patient/Patient Representative Signature      ..........................................................................................................................................  Patient Representative Print Name and Relationship to Patient    ..................................................               ................................................  Date                                            Time    ..........................................................................................................................................  Reviewed by Signature/Title    ...................................................              ..............................................  Date                                                            Time

## 2017-11-07 NOTE — ANESTHESIA PREPROCEDURE EVALUATION
Procedure: Procedure(s):  ESOPHAGOGASTRECTOMY  Preop diagnosis: ADENOCARCINOMA DISTAL ESOPHAGUS    No Active Allergies  Past Medical History:   Diagnosis Date     Actinic keratosis      Diabetes (H)     dm 2     Elevated C-reactive protein      Esophageal cancer (H)      GERD (gastroesophageal reflux disease)      Gout      Herpes      High cholesterol      Hyperlipidemia      Hypertension      Kidney stone      Low back pain      Osteoarthritis      Osteoarthritis      Other chronic pain     In knees and back     Restless legs syndrome      Past Surgical History:   Procedure Laterality Date     ENT SURGERY      tonsilectomy     ESOPHAGOSCOPY, GASTROSCOPY, DUODENOSCOPY (EGD), COMBINED N/A 8/2/2017    Procedure: COMBINED ESOPHAGOSCOPY, GASTROSCOPY, DUODENOSCOPY (EGD), BIOPSY SINGLE OR MULTIPLE;  ESOPHAGOSCOPY, GASTROSCOPY, DUODENOSCOPY (EGD) with biopsy;  Surgeon: Jeffery Keene MD;  Location:  GI     ESOPHAGOSCOPY, GASTROSCOPY, DUODENOSCOPY (EGD), COMBINED N/A 8/17/2017    Procedure: COMBINED ENDOSCOPIC ULTRASOUND, ESOPHAGOSCOPY, GASTROSCOPY, DUODENOSCOPY (EGD);  ENDOSCOPIC ULTRASOUND, ESOPHAGOSCOPY, GASTROSCOPY, DUODENOSCOPY (EGD) with fine needle aspiration;  Surgeon: Elizabeth Castro MD;  Location:  OR     KERATOTOMY ARCUATE WITH FEMTOSECOND LASER/IMAGING FOR ATIOL Left 4/19/2017    Procedure: KERATOTOMY ARCUATE WITH FEMTOSECOND LASER/IMAGING FOR ATIOL;  LEFT EYE FEMTOSECOND LASER CAPSULOTOMY, LENS FRAGMENTATION, ARCUATE INCISIONS, CATARACT INCISIONS  ;  Surgeon: Riley Whatley MD;  Location: [facility]     ORTHOPEDIC SURGERY      right knee     ORTHOPEDIC SURGERY      right shoulder     PHACOEMULSIFICATION CLEAR CORNEA WITH STANDARD INTRAOCULAR LENS IMPLANT Left 4/19/2017    Procedure: PHACOEMULSIFICATION CLEAR CORNEA WITH STANDARD INTRAOCULAR LENS IMPLANT;   COMPLEX FEMTOLASER ASSISTED LEFT EYE PHACOEMULSIFICATION CLEAR CORNEA WITH STANDARD INTRAOCULAR LENS IMPLANT   ;  Surgeon: Riley Whatley  MD;  Location: [facility]     Social History   Substance Use Topics     Smoking status: Former Smoker     Types: Cigarettes     Smokeless tobacco: Never Used     Alcohol use No     Prior to Admission medications    Medication Sig Start Date End Date Taking? Authorizing Provider   GLIPIZIDE ER PO Take 5 mg by mouth daily   Yes Reported, Patient   ZOLPIDEM TARTRATE PO Take 5-10 mg by mouth nightly as needed for sleep   Yes Reported, Patient   atenolol-chlorthalidone (TENORETIC) 100-25 MG per tablet Take 1 tablet by mouth daily   Yes Reported, Patient   METFORMIN HCL PO Take 1,000 mg by mouth daily    Yes Reported, Patient   LISINOPRIL PO Take 10 mg by mouth daily    Yes Reported, Patient   ALLOPURINOL PO Take 300 mg by mouth daily    Yes Reported, Patient   Rosuvastatin Calcium (CRESTOR PO) Take 40 mg by mouth daily    Yes Reported, Patient   VENLAFAXINE HCL PO Take 75 mg by mouth daily (Patient taking differently than prescribed; Prescribed as 75mg twice a day)   Yes Reported, Patient   OMEPRAZOLE PO Take 20 mg by mouth    Yes Reported, Patient   ASPIRIN PO Take 81 mg by mouth daily as needed    Yes Reported, Patient     Current Facility-Administered Medications Ordered in Epic   Medication Dose Route Frequency Last Rate Last Dose     ceFAZolin sodium-dextrose (ANCEF) infusion 2 g  2 g Intravenous Pre-Op/Pre-procedure x 1 dose         ceFAZolin (ANCEF) 1 g vial to attach to  ml bag for ADULT or 50 ml bag for PEDS  1 g Intravenous See Admin Instructions         lidocaine 1 % 1 mL  1 mL Other Q1H PRN         lactated ringers infusion   Intravenous Continuous         No current [facility]-ordered outpatient prescriptions on file.       lactated ringers       Wt Readings from Last 1 Encounters:   08/17/17 95.3 kg (210 lb 3.2 oz)     Temp Readings from Last 1 Encounters:   09/22/17 36.4  C (97.6  F) (Oral)     BP Readings from Last 6 Encounters:   09/22/17 138/82   08/17/17 118/85   08/02/17 114/70   04/19/17 (!) 139/95    01/10/16 (!) 146/94     Pulse Readings from Last 4 Encounters:   09/22/17 75   01/09/16 63     Resp Readings from Last 1 Encounters:   09/22/17 22     SpO2 Readings from Last 1 Encounters:   09/22/17 97%     Recent Labs   Lab Test  09/22/17   1854  08/17/17   1308   NA  134   --    POTASSIUM  4.5  4.3   CHLORIDE  102   --    CO2  24   --    ANIONGAP  8   --    GLC  183*  142*   BUN  36*   --    CR  1.36*  1.86*   ELIANA  9.2   --      No results for input(s): AST, ALT, ALKPHOS, BILITOTAL, LIPASE in the last 30238 hours.  Recent Labs   Lab Test  09/22/17   1854   WBC  1.6*   HGB  13.2*   PLT  155     Recent Labs   Lab Test  10/30/17   1510   ABO  O   RH  Pos     No results for input(s): INR, PTT in the last 84442 hours.   No results for input(s): TROPI in the last 40023 hours.  No results for input(s): PH, PCO2, PO2, HCO3 in the last 81595 hours.  No results for input(s): HCG in the last 57174 hours.  No results found for this or any previous visit (from the past 744 hour(s)).    RECENT LABS:   ECG:   ECHO:     Anesthesia Evaluation     .             ROS/MED HX    ENT/Pulmonary:     (+)tobacco use, Past use , . .   (-) sleep apnea   Neurologic:      (-) seizures, CVA, TIA and Neuropathy   Cardiovascular:     (+) Dyslipidemia, hypertension----. : . . . :. .       METS/Exercise Tolerance:     Hematologic:         Musculoskeletal:   (+) arthritis, , , -       GI/Hepatic:     (+) GERD Asymptomatic on medication,       Renal/Genitourinary:     (+) Nephrolithiasis ,       Endo:     (+) type II DM .      Psychiatric:         Infectious Disease:         Malignancy:   (+) Malignancy History of GI          Other:                     Physical Exam  Normal systems: cardiovascular and pulmonary    Airway   Mallampati: III  TM distance: >3 FB  Neck ROM: full    Dental   (+) caps and implants    Cardiovascular       Pulmonary                     Anesthesia Plan      History & Physical Review  History and physical reviewed and  following examination; no interval change.    ASA Status:  3 .    NPO Status:  > 8 hours    Plan for General, RSI and ETT with Intravenous and Propofol induction. Maintenance will be Inhalation.    PONV prophylaxis:  Ondansetron (or other 5HT-3) and Dexamethasone or Solumedrol  Additional equipment: Videolaryngoscope and 2nd IV Decadron and Zofran for PONV      Postoperative Care  Postoperative pain management:  IV analgesics and Oral pain medications.      Consents  Anesthetic plan, risks, benefits and alternatives discussed with:  Patient and Spouse..                          .

## 2017-11-07 NOTE — ANESTHESIA CARE TRANSFER NOTE
Patient: Ramon Winter    Procedure(s):  MARILEE BROOKS ESOPHAGOGASTRECTOMY, PLACEMENT OF FEEDING TUBE JEJUNOSOTMY, PYLOROMYOTOMY, LIGATION THORACIC DUCT - Wound Class: II-Clean Contaminated    Diagnosis: ADENOCARCINOMA DISTAL ESOPHAGUS  Diagnosis Additional Information: No value filed.    Anesthesia Type:   General, RSI, ETT     Note:  Airway :Face Mask  Patient transferred to:PACU  Comments: Transferred to PACU, spontaneous respirations, 10L oxygen via facemask.  All monitors and alarms on and functioning, VSS.  Patient awake, comfortable.  Report to PACU RN.Handoff Report: Identifed the Patient, Identified the Reponsible Provider, Reviewed the pertinent medical history, Discussed the surgical course, Reviewed Intra-OP anesthesia mangement and issues during anesthesia, Set expectations for post-procedure period and Allowed opportunity for questions and acknowledgement of understanding      Vitals: (Last set prior to Anesthesia Care Transfer)    CRNA VITALS  11/7/2017 1524 - 11/7/2017 1605      11/7/2017             Pulse: 105    SpO2: 100 %                Electronically Signed By: JINNY Jaimes CRNA  November 7, 2017  4:05 PM

## 2017-11-07 NOTE — IP AVS SNAPSHOT
MRN:[id number]                      After Visit Summary   11/7/2017    Ramon Winter    MRN: [id number]           Thank you!     Thank you for choosing [facility] for your care. Our goal is always to provide you with excellent care. Hearing back from our patients is one way we can continue to improve our services. Please take a few minutes to complete the written survey that you may receive in the mail after you visit with us. Thank you!        Patient Information     Date Of Birth          1945        Designated Caregiver       Most Recent Value    Caregiver    Will someone help with your care after discharge? yes    Name of designated caregiver Yessy    Phone number of caregiver [phone]    Caregiver address [address] [place] [address]      About your hospital stay     You were admitted on:  November 7, 2017 You last received care in the:  [facility]    You were discharged on:  November 14, 2017        Reason for your hospital stay       Esophagectomy for esophageal cancer with Dr. Jeter.                  Who to Call     For medical emergencies, please call 911.  For non-urgent questions about your medical care, please call your primary care provider or clinic, [phone]  For questions related to your surgery, please call your surgery clinic        Attending Provider     Provider Maximilian Sparks MD Thoracic Diseases       Primary Care Provider Office Phone # Fax #    Joshua Gonsalez [phone]      After Care Instructions     Diet       Follow this diet upon discharge:  Full liquid diet until you see Dr. Jeter on 11/22/17  At your 11/22 appointment, Dr. Jeter will advise you to advance to a soft mechanical diet                  Follow-up Appointments     Follow-up and recommended labs and tests       Follow up with primary care provider, Joshua Gonsalez, within 7 days for hospital follow- up.   "BMP in 1 week                  Further instructions from your care team       [facility]  Discharge Orders & Follow-up Care  Video-Assisted: Thoracoscopy - Thoracotomy    Call Suzy at Dr. Jeter  office at [phone] to make a return appointment with a chest x-ray on_Wednesday, November 22_.  Please also remind Suzy that you need your blood drawn for some labs before your visit. Your appointment will be with either Maddi Torres PA-C, or Dr. Jeter, or both.     Our office is located at:  [facility], [address] [place] [address], [address] [address].  Suzy will explain where to park when you call for an appointment.    A. Patient Care  Call Dr Jeter  office @ [phone] if:  ? Severe chills or a fever or 100.4 F.  temperature on two occasions  ? Increased incisional pain and/or redness  ? Presence of unusual incisional or chest tube site drainage  ? Coughing up bright red blood or greenish-yellow secretions  ? Significant increase in shortness of breath    Pain Relief  You have been given a prescription for narcotic pain medicine.  You may also take acetaminophen over the counter.  Recommended dosages are: 650 mg Acetaminophen every 4 hours as needed.  Many patients get good pain relief by \"staggering\" these medications.     No driving while on narcotics.     Activity  _XXX__ No heavy lifting greater than 10 pounds on the operative side for 4-6 weeks for a thoracotomy    Wound Care  You may shower tomorrow.  Wash the incision and chest tube site(s) daily with soap and water. No bathing or immersing incision underwater for approximately 2 weeks or until the chest tube sites are completely healed. Please remember to cover your feeding tube with plastic wrap or a plastic bag and tape to prevent it from getting wet.    Place a dry gauze dressing over the chest tube site(s) because it(they) will drain a few days.  Do not be alarmed if there is drainage of a large amount of " "fluid (should be pink or yellow-- or somewhat bloody) either spontaneously or with coughing or exertion. If this happens, just place a large dry gauze dressing over the chest tube site-- it will stop and scab over in about a week or so. Once the drainage stops, then leave the chest tube site(s) open to air.     White steri strips will be present on the incision(s). They will peel off within about 10 days-- otherwise, they will be removed at your post-op appointment.     B. Respiratory  _XXX__ Utilize Incentive spirometer 10 times in a row every few hours while awake for a few weeks after discharging home from the hospital    C. Diet  Please remain on a full liquid diet, as instructed by the dietitian, until you see Dr. Jeter on 11/22.    D. Flush  Please remember to flush your feeding tube once a day with 60 cc of tap water to prevent your tube from clogging        Pending Results     No orders found from 11/5/2017 to 11/8/2017.            Statement of Approval     Ordered          11/14/17 0957  I have reviewed and agree with all the recommendations and orders detailed in this document.  EFFECTIVE NOW     Approved and electronically signed by:  Elaine Su, PAVEL             Admission Information     Date & Time Provider Department Dept. Phone    11/7/2017 Maximilian Jeter MD [facility] [address] [facility] [phone]      Your Vitals Were     Blood Pressure Pulse Temperature Respirations Height Weight    116/84 (BP Location: Left arm) 81 97.6  F (36.4  C) (Oral) 16 1.753 m (5' 9\") 88.9 kg (196 lb)    Pulse Oximetry BMI (Body Mass Index)                94% 28.94 kg/m2          MyChart Information     [facility] lets you send messages to your doctor, view your test results, renew your prescriptions, schedule appointments and more. To sign up, go to [email or web address] . Click on \"Log in\" on the left side of the screen, which will take you to the Welcome page. Then click on \"Sign up Now\" " on the right side of the page.     You will be asked to enter the access code listed below, as well as some personal information. Please follow the directions to create your username and password.     Your access code is: [email or web address]  Expires: 2017  9:16 PM     Your access code will  in 90 days. If you need help or a new code, please call your [facility] clinic or [phone].        Care EveryWhere ID     This is your Care EveryWhere ID. This could be used by other organizations to access your [facility] medical records  [id number]        Equal Access to Services     [facility]: Hadrobert Russo, kentrell lowery, derrek leahy, kim marroquin . So [facility] [phone].    ATENCIÓN: Si habla español, tiene a rashid disposición servicios gratuitos de asistencia lingüística. Llame al [phone].    We comply with applicable federal civil rights laws and [place] laws. We do not discriminate on the basis of race, color, national origin, age, disability, sex, sexual orientation, or gender identity.               Review of your medicines      START taking        Dose / Directions    Acetaminophen 325 MG Caps        Dose:  650 mg   Take 650 mg by mouth 4 times daily as needed   Quantity:  100 capsule   Refills:  0       HYDROmorphone 2 MG tablet   Commonly known as:  DILAUDID        Dose:  2-4 mg   Take 1-2 tablets (2-4 mg) by mouth every 4 hours as needed for moderate to severe pain   Quantity:  50 tablet   Refills:  0       pantoprazole 40 MG EC tablet   Commonly known as:  PROTONIX        Dose:  40 mg   Take 1 tablet (40 mg) by mouth daily Take 30-60 minutes before a meal.   Quantity:  30 tablet   Refills:  1       potassium chloride 20 MEQ Packet   Commonly known as:  KLOR-CON   Used for:  Hypokalemia        Dose:  20 mEq   Take 20 mEq by mouth daily   Quantity:  30 tablet   Refills:  0         CONTINUE these medicines which have NOT CHANGED        Dose /  Directions    ALLOPURINOL PO        Dose:  300 mg   Take 300 mg by mouth daily   Refills:  0       ASPIRIN PO        Dose:  81 mg   Take 81 mg by mouth daily as needed   Refills:  0       atenolol-chlorthalidone 100-25 MG per tablet   Commonly known as:  TENORETIC        Dose:  1 tablet   Take 1 tablet by mouth daily   Refills:  0       CRESTOR PO        Dose:  40 mg   Take 40 mg by mouth daily   Refills:  0       GLIPIZIDE ER PO        Dose:  5 mg   Take 5 mg by mouth daily   Refills:  0       LISINOPRIL PO        Dose:  10 mg   Take 10 mg by mouth daily   Refills:  0       METFORMIN HCL PO        Dose:  1000 mg   Take 1,000 mg by mouth daily   Refills:  0       VENLAFAXINE HCL PO        Dose:  75 mg   Take 75 mg by mouth daily (Patient taking differently than prescribed; Prescribed as 75mg twice a day)   Refills:  0       ZOLPIDEM TARTRATE PO        Dose:  5-10 mg   Take 5-10 mg by mouth nightly as needed for sleep   Refills:  0         STOP taking     OMEPRAZOLE PO                Where to get your medicines      These medications were sent to [facility] - [address]  [address] [facility] [address], [place] [address]     Phone:  [phone]     pantoprazole 40 MG EC tablet    potassium chloride 20 MEQ Packet         Some of these will need a paper prescription and others can be bought over the counter. Ask your nurse if you have questions.     Bring a paper prescription for each of these medications     HYDROmorphone 2 MG tablet       You don't need a prescription for these medications     Acetaminophen 325 MG Caps                Protect others around you: Learn how to safely use, store and throw away your medicines at www.disposemymeds.org.             Medication List: This is a list of all your medications and when to take them. Check marks below indicate your daily home schedule. Keep this list as a reference.      Medications           Morning Afternoon Evening Bedtime As Needed     Acetaminophen 325 MG Caps   Take 650 mg by mouth 4 times daily as needed                                ALLOPURINOL PO   Take 300 mg by mouth daily                                ASPIRIN PO   Take 81 mg by mouth daily as needed                                atenolol-chlorthalidone 100-25 MG per tablet   Commonly known as:  TENORETIC   Take 1 tablet by mouth daily                                CRESTOR PO   Take 40 mg by mouth daily                                GLIPIZIDE ER PO   Take 5 mg by mouth daily                                HYDROmorphone 2 MG tablet   Commonly known as:  DILAUDID   Take 1-2 tablets (2-4 mg) by mouth every 4 hours as needed for moderate to severe pain   Last time this was given:  4 mg on 11/14/2017 12:34 PM                                LISINOPRIL PO   Take 10 mg by mouth daily   Last time this was given:  10 mg on 11/14/2017  9:50 AM                                METFORMIN HCL PO   Take 1,000 mg by mouth daily                                pantoprazole 40 MG EC tablet   Commonly known as:  PROTONIX   Take 1 tablet (40 mg) by mouth daily Take 30-60 minutes before a meal.                                potassium chloride 20 MEQ Packet   Commonly known as:  KLOR-CON   Take 20 mEq by mouth daily                                VENLAFAXINE HCL PO   Take 75 mg by mouth daily (Patient taking differently than prescribed; Prescribed as 75mg twice a day)                                ZOLPIDEM TARTRATE PO   Take 5-10 mg by mouth nightly as needed for sleep

## 2017-11-08 ENCOUNTER — APPOINTMENT (OUTPATIENT)
Dept: GENERAL RADIOLOGY | Facility: CLINIC | Age: 72
DRG: 327 | End: 2017-11-08
Attending: PHYSICIAN ASSISTANT
Payer: MEDICARE

## 2017-11-08 LAB
GLUCOSE BLDC GLUCOMTR-MCNC: 153 MG/DL (ref 70–99)
GLUCOSE BLDC GLUCOMTR-MCNC: 177 MG/DL (ref 70–99)
GLUCOSE BLDC GLUCOMTR-MCNC: 183 MG/DL (ref 70–99)
GLUCOSE BLDC GLUCOMTR-MCNC: 191 MG/DL (ref 70–99)
GLUCOSE BLDC GLUCOMTR-MCNC: 201 MG/DL (ref 70–99)
GLUCOSE BLDC GLUCOMTR-MCNC: 205 MG/DL (ref 70–99)
HBA1C MFR BLD: 6.9 % (ref 4.3–6)
MAGNESIUM SERPL-MCNC: 1.4 MG/DL (ref 1.6–2.3)
POTASSIUM SERPL-SCNC: 4.2 MMOL/L (ref 3.4–5.3)

## 2017-11-08 PROCEDURE — 21400002 ZZH R&B CCU CICU CRITICAL

## 2017-11-08 PROCEDURE — A9270 NON-COVERED ITEM OR SERVICE: HCPCS | Mod: GY | Performed by: PHYSICIAN ASSISTANT

## 2017-11-08 PROCEDURE — 00000146 ZZHCL STATISTIC GLUCOSE BY METER IP

## 2017-11-08 PROCEDURE — 84132 ASSAY OF SERUM POTASSIUM: CPT | Performed by: NURSE PRACTITIONER

## 2017-11-08 PROCEDURE — 99232 SBSQ HOSP IP/OBS MODERATE 35: CPT | Performed by: INTERNAL MEDICINE

## 2017-11-08 PROCEDURE — 25000132 ZZH RX MED GY IP 250 OP 250 PS 637: Mod: GY | Performed by: PHYSICIAN ASSISTANT

## 2017-11-08 PROCEDURE — 83735 ASSAY OF MAGNESIUM: CPT | Performed by: NURSE PRACTITIONER

## 2017-11-08 PROCEDURE — 25000131 ZZH RX MED GY IP 250 OP 636 PS 637: Mod: GY | Performed by: INTERNAL MEDICINE

## 2017-11-08 PROCEDURE — 25000128 H RX IP 250 OP 636: Performed by: PHYSICIAN ASSISTANT

## 2017-11-08 PROCEDURE — 36415 COLL VENOUS BLD VENIPUNCTURE: CPT | Performed by: NURSE PRACTITIONER

## 2017-11-08 PROCEDURE — 25000125 ZZHC RX 250: Performed by: PHYSICIAN ASSISTANT

## 2017-11-08 PROCEDURE — 71010 XR CHEST PORT 1 VW: CPT

## 2017-11-08 PROCEDURE — 83036 HEMOGLOBIN GLYCOSYLATED A1C: CPT | Performed by: NURSE PRACTITIONER

## 2017-11-08 RX ADMIN — METOPROLOL TARTRATE 5 MG: 5 INJECTION INTRAVENOUS at 14:21

## 2017-11-08 RX ADMIN — INSULIN ASPART 2 UNITS: 100 INJECTION, SOLUTION INTRAVENOUS; SUBCUTANEOUS at 00:05

## 2017-11-08 RX ADMIN — SODIUM CHLORIDE, POTASSIUM CHLORIDE, SODIUM LACTATE AND CALCIUM CHLORIDE: 600; 310; 30; 20 INJECTION, SOLUTION INTRAVENOUS at 04:19

## 2017-11-08 RX ADMIN — METOPROLOL TARTRATE 5 MG: 5 INJECTION INTRAVENOUS at 19:46

## 2017-11-08 RX ADMIN — PANTOPRAZOLE SODIUM 40 MG: 40 INJECTION, POWDER, FOR SOLUTION INTRAVENOUS at 06:55

## 2017-11-08 RX ADMIN — INSULIN GLARGINE 5 UNITS: 100 INJECTION, SOLUTION SUBCUTANEOUS at 21:03

## 2017-11-08 RX ADMIN — BACITRACIN: 500 OINTMENT TOPICAL at 08:34

## 2017-11-08 RX ADMIN — METOPROLOL TARTRATE 5 MG: 5 INJECTION INTRAVENOUS at 08:33

## 2017-11-08 RX ADMIN — ENOXAPARIN SODIUM 40 MG: 40 INJECTION SUBCUTANEOUS at 08:33

## 2017-11-08 RX ADMIN — INSULIN ASPART 2 UNITS: 100 INJECTION, SOLUTION INTRAVENOUS; SUBCUTANEOUS at 16:21

## 2017-11-08 RX ADMIN — SODIUM CHLORIDE, POTASSIUM CHLORIDE, SODIUM LACTATE AND CALCIUM CHLORIDE: 600; 310; 30; 20 INJECTION, SOLUTION INTRAVENOUS at 19:46

## 2017-11-08 RX ADMIN — INSULIN ASPART 1 UNITS: 100 INJECTION, SOLUTION INTRAVENOUS; SUBCUTANEOUS at 04:15

## 2017-11-08 RX ADMIN — INSULIN ASPART 1 UNITS: 100 INJECTION, SOLUTION INTRAVENOUS; SUBCUTANEOUS at 08:33

## 2017-11-08 RX ADMIN — ACETAMINOPHEN 650 MG: 325 SOLUTION ORAL at 14:21

## 2017-11-08 RX ADMIN — INSULIN ASPART 2 UNITS: 100 INJECTION, SOLUTION INTRAVENOUS; SUBCUTANEOUS at 21:03

## 2017-11-08 NOTE — CONSULTS
"DATE OF SERVICE:  11/07/2017.      REQUESTING PHYSICIAN:  BRIANNA James.     PRIMARY CARE PROVIDER: Joshua Gonsalez    ONCOLOGIST: Dr. Sebastian Lamb     REASON FOR CONSULTATION:  Medical comanagement.      HISTORY OF PRESENT ILLNESS:  Mr. 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 Maximilian Jeter.  Procedure was completed under general anesthesia with an estimated blood loss of 200 mL.      Presently patient is seen in the postoperative unit.  Mr. Winter noted that his \"food was getting hung up\" while he was trying to swallow approximately 3 months ago and was also experiencing painful hiccups.  The patient reports over the past 2-1/2 months he has had a 30-pound weight loss.  The patient was diagnosed with adenocarcinoma of the distal esophagus.  The patient received 5 treatments of chemotherapy that was completed approximately 5 weeks ago.  The patient reports that he has had increased dyspnea post chemotherapy associated with dizziness. The patient also reports that he has had intermittent chills over the past 2-1/2 months.  The patient reports no chest pain, nausea, vomiting, constipation or fevers.  The patient reports that he has had chronic diarrhea for the past 20+ years.  The patient is being admitted for further evaluation and management.      PAST MEDICAL HISTORY:   1.  Benign prostatic hyperplasia.   2.  Chronic kidney disease stage III.   3.  Actinic keratosis.   4.  Diabetes mellitus, type 2, without complication.   5.  Adenocarcinoma distal esophagus.   6. "  Gastroesophageal reflux disease.   7.  Gout.   8.  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   9.  Hyperlipidemia.   10.  Essential hypertension.   11.  Nephrolithiasis.   12.  Osteoarthritis.   13.  Restless legs syndrome.   14.  Anxiety.   15.  Low back and knee pain chronically.      PAST SURGICAL HISTORY:   1.  Tonsillectomy.   2.  Right knee ligament repair.   3.  Right shoulder arthroplasty.   4.  Left tympanic membrane repair.   5.  Bilateral cataracts.      SOCIAL HISTORY:   1.  Tobacco:  Former smoker, quit in 1988.  Prior was a 2-pack-per-day smoker for 20 years.   2.  Alcohol, quit in 1988.   3.  Illicit drugs:  None.   4.  Lives in a house with his wife.      FAMILY HISTORY:   1.  Father cancer.   2.  Aunt, cancer.   3.  Aunt, cancer.      ALLERGIES:   1.  Aspirin.   2.  Lisinopril, cough.  The patient reports that his lisinopril dose was halved to help with the cough.      MEDICATIONS:    Prior to Admission medications    Medication Sig Last Dose Taking? Auth Provider   GLIPIZIDE ER PO Take 5 mg by mouth daily 11/6/2017 at Unknown time Yes Reported, Patient   ZOLPIDEM TARTRATE PO Take 5-10 mg by mouth nightly as needed for sleep 11/6/2017 at prn Yes Reported, Patient   atenolol-chlorthalidone (TENORETIC) 100-25 MG per tablet Take 1 tablet by mouth daily 11/6/2017 at Unknown time Yes Reported, Patient   METFORMIN HCL PO Take 1,000 mg by mouth daily  11/6/2017 at Unknown time Yes Reported, Patient   LISINOPRIL PO Take 10 mg by mouth daily  11/6/2017 at Unknown time Yes Reported, Patient   ALLOPURINOL PO Take 300 mg by mouth daily  11/6/2017 at Unknown time Yes Reported, Patient   Rosuvastatin Calcium (CRESTOR PO) Take 40 mg by mouth daily  11/6/2017 at Unknown time Yes Reported, Patient   VENLAFAXINE HCL PO Take 75 mg by mouth daily (Patient taking differently than prescribed; Prescribed as 75mg twice a day) 11/6/2017 at Unknown time Yes Reported, Patient   OMEPRAZOLE PO Take 20 mg by mouth  11/6/2017 at Unknown time Yes  Reported, Patient   ASPIRIN PO Take 81 mg by mouth daily as needed  more than a month Yes Reported, Patient     REVIEW OF SYSTEMS:  A 10-point review of systems was completed.  All pertinent positives are noted in the HPI.  All other systems negative.      PHYSICAL EXAMINATION:   VITAL SIGNS:  Reviewed and are as follows:  Temperature 98.6, blood pressure 109/83, heart rate 85, respiratory rate 19, O2 sats 97% on 2 liters.   CONSTITUTIONAL:  The patient lying in bed, pleasant, awake, alert, cooperative, in no apparent distress and appears stated age.   HEENT:  Pupils equal, round and reactive to light.  Extraocular muscles intact.  Sclerae are clear.  Normocephalic, atraumatic.  Oropharynx with moist mucous membranes.  Nasogastric tube in place.   LUNGS:  No increased work of breathing.  Clear to auscultation bilaterally.  No crackles or wheezing noted.  Right chest tube present with no air leak noted.  On-Q pump noted at CT site.   CARDIOVASCULAR:  Normal apical pulse, regular rate and rhythm.  Normal S1 and S2.  No murmur noted.   ABDOMEN:  Faint bowel sounds.  Soft, nondistended, nontender.  No masses palpated.   EXTREMITIES:  Moves all 4 extremities.  Dorsalis pedis and radial pulses palpable bilaterally.  Lower extremities with no edema.   NEUROLOGIC:  Awake, alert, oriented.  Cranial nerves II-XII are grossly intact.  Sensory is intact.   SKIN:  Warm and dry.  No rash noted or erythema.      LABORATORY DATA:  Reviewed in Epic.      ASSESSMENT AND PLAN:  Mr. Ramon Winter is a 71-year-old gentleman with past medical history significant for diabetes mellitus type 2 without complication, adenocarcinoma of the distal esophagus, gastroesophageal reflux disease, gout, hyperlipidemia, essential hypertension, chronic kidney disease stage III, benign prostatic hyperplasia, and anxiety who presents today for an elective esophagogastrectomy and placement of jejunostomy feeding tube.  The patient is admitted for further  evaluation and management.     Status post esophagogastrectomy, placement of jejunostomy feeding tube.   Adenocarcinoma distal esophagus, status post chemotherapy.  -- Above diagnoses to be managed per primary service.   -- The patient on Dilaudid PCA and On-Q ball for pain management. -- Narcan ordered.   -- Patient n.p.o.  NG tube in place to LIS, do not adjust per primary recommendations.  HOB to remain at 30 degrees.  -- Hold prior to admission p.o. medications.   -- IV fluids, lactated Ringer's infusing at 125 mL per hour per primary recommendations.   -- PRN Zofran and Compazine ordered.   -- Right chest tube in place to be managed per primary service recommendations.   -- Conditional hemoglobin order in place.   -- Encourage incentive spirometry and cough and deep breathing exercises while patient awake.   -- Oneal in place to be discontinued per primary service recommendations.   -- Telemetry monitoring continuously.  -- VS with pulse oximetry every 2 hours.   -- Wean O2 to maintain O2 sats >92%.    Essential hypertension.   -- Hold prior to admission Tenoretic and lisinopril.   -- Primary Service scheduled metoprolol IV q.6 hours.  Hold parameters in place for metoprolol IV for heart rate less than 65 or systolic blood pressure less than 110.   -- PRN hydralazine and labetalol ordered.     Hyperlipidemia.   -Hold prior to admission Crestor.     Diabetes mellitus, type 2, without complication.   -- Hold prior to admission glipizide and metformin.   -- Medium intensity correction scale insulin ordered to be administered every 4 hours per protocol; POCT q4h and at 0200 while NPO   -- Hypoglycemia protocol ordered.   -- Patient n.p.o., continued on lactated Ringer's infusion at 125 mL per hour.  -- Hemoglobin A1C ordered.     Chronic kidney disease, stage III.  -- Creatinine on admission 1.53, GFR 45.  -- Repeat BMP in AM.  -- Avoid nephrotoxic agents if possible.  -- Continue IVF at this time.      Gastroesophageal reflux disease, stable.   -- Hold prior to admission omeprazole.   -- Protonix IV daily ordered.     Gout, stable.  -- Hold prior to admission allopurinol.   -- Continue to monitor.     Deep venous thrombosis prophylaxis.  -- To be managed per primary service, mechanical PCD's and Lovenox ordered.     CODE STATUS:  Full code.      We, the Hospitalist Service, thank you for this consult.  This patient was discussed with Dr. Royal Garcia of the Hospitalist Service, who agrees with the current plans as outlined above.         ROYAL GARCIA MD       As dictated by IVONNE ROUSSEAU NP            D: 2017 19:55   T: 2017 20:52   MT:       Name:     SHANE HOBBS   MRN:      [id number]        Account:       [id number]   :      1945           Consult Date:  2017      Document: [id number]       cc: CARLEY REED

## 2017-11-08 NOTE — OP NOTE
DATE OF OPERATION:  11/07/2017.      SURGEON:  Maximilian Jeter MD       ASSISTANT:  Elaine Su PA-C      PREOPERATIVE DIAGNOSIS:  Adenocarcinoma of the distal esophagus, status post induction chemotherapy and radiation therapy.      POSTOPERATIVE DIAGNOSIS:  Adenocarcinoma of the distal esophagus, status post induction chemotherapy and radiation therapy.      PROCEDURES:   1.  [place] esophagogastrectomy and resection of distal 3/4 of esophagus and proximal 1/3 of stomach with intrathoracic esophagogastric anastomosis, EEA 25 mm.   2.  Pyloromyotomy.   3.  Ligation of thoracic duct.   4.  Placement of feeding jejunostomy tube, Witzel type.      ANESTHESIA:  General with double-lumen endotracheal tube.      INDICATIONS:  Ramon Winter is a 71-year-old gentleman who was diagnosed with adenocarcinoma of the distal esophagus.  He underwent induction chemotherapy and radiation therapy.  The most recent PET scan shows a residual tumor which is less hypermetabolic than it was initially.  There is no evidence of distant disease.  Based on the findings, a resection is indicated for treatment.      DESCRIPTION OF PROCEDURE:  The patient was brought to the OR and placed in supine position.  Under general anesthesia with double-lumen endotracheal tube, the patient's chest and abdomen were prepared in the usual fashion using ChloraPrep.  An upper midline incision was made.  The peritoneal cavity was entered.  Exploration was negative.  There was a hiatal hernia.  The hiatus was dissected and the distal esophagus encircled with a Penrose drain.  In the distal esophagus, there is some fullness and thickening of the wall consistent with a known tumor.  Then, the lesser sac was entered, preserving the right gastroepiploic artery.  The greater curvature of the stomach was prepared, dividing all the short gastric vessels from the angle of His to the pylorus, carefully protecting the right gastroepiploic artery.  Then,  the left gastric vessels were identified, dissected and stapled with applications of [place] 60 mm vascular stapling device.  Then, the lesser curvature of the stomach was prepared, dividing the distal branches of the right gastric artery.  This completed the dissection of the entire distal esophagus and stomach down to the pylorus.  Pyloromyotomy was made.  The mucosa was intact.  Then, the loop of small bowel 15 cm distal to the ligament of Treitz was selected and a jejunostomy tube was placed outside the abdominal wall and placed in the selected loop of jejunum in a Witzel fashion.  Then, the abdominal incision was irrigated with normal saline, which was aspirated.  Incision was closed with running loop 0 PDS.  The subcutaneous tissue was closed with running 2-0 Vicryl and skin closed with Insorb staples.      Then, the patient was placed in the left lateral decubitus position.  The right chest was prepared and draped in the usual fashion using ChloraPrep.  A right posterolateral thoracotomy was made sparing the serratus anterior muscle.  The pleural space was entered in the fourth intercostal space, dividing the fifth rib posteriorly.  The hilum was dissected posteriorly.  The azygos vein was circumferentially stapled with applications of [place] 60 mm vascular stapling device.  Then, the entire esophagus was dissected circumferentially.  The esophagus was clamped just below the inlet and divided just above the level of the azygos vein.  Then, an end-to-end anastomosis was made with an EEA stapler.  Rings were intact.  The resection was completed, dividing the stomach with multiple applications of [place] 60 mm gold stapling device, forming a gastric tube.  The staple lines were reinforced with interrupted 3-0 silk suture.  Nasogastric tube was advanced.  The thoracic duct was then identified low in the chest and doubly clipped.  Hemostasis was verified and was excellent.  Pleural cavity was irrigated with  normal saline, which was aspirated.  Through a separate stab wound, a 24-M[place] Michel drain was placed and sutured to the skin with 2-0 silk suture.  On-Q catheters were placed and 30 mL Marcaine 0.5% without epinephrine was injected as intercostal block.  Thoracotomy incision was closed with pericostal suture of Vicryl #1, running Vicryl in muscular layer, running 2-0 Vicryl in the subcutaneous tissues, closed with Insorb staples.  Estimated blood loss 200 mL.  Needle and sponge counts are correct.      Elaine Su PA-C was the first assistant during the procedure.  Her role as first assistant was essential and necessary in accomplishing the steps of the procedures as described above, providing exposure and retraction.         MAXIMILIAN JETER MD             D: 2017 07:38   T: 2017 09:15   MT:       Name:     SHANE HOBBS   MRN:      [id number]        Account:        [id number]   :      1945           Procedure Date: 2017      Document: [id number]       cc: Maximilian Jeetr MD

## 2017-11-08 NOTE — PROGRESS NOTES
THORACIC SURGERY POD # 1    Doing well  AVSS on RA  Good U/O  Serous CT output  Abdomen soft  CXR OK    Satisfactory    NGT to lis  NPO  CT suction  Ambulate  resp care ++    COLT LORENZ MD [facility] ONCOLOGY THORACIC SURGERY  CELL:  [phone]  OFFICE: [phone]

## 2017-11-08 NOTE — PROGRESS NOTES
[facility]  Hospitalist Progress Note  Fritz Rubio MD    Assessment & Plan   Mr. Ramon Winter is a 71-year-old gentleman with past medical history significant for DM2, adenocarcinoma of the distal esophagus, gastroesophageal reflux disease, gout, hyperlipidemia, essential hypertension, chronic kidney disease stage III, benign prostatic hyperplasia, and anxiety who underwent elective esophagogastrectomy and placement of jejunostomy feeding tube on 11/07.  [facility]ospitalsit Service is consulted to assist with medical management.    Adenocarcinoma of distal esophagus, status post chemotherapy.  Status post esophagogastrectomy and placement of jejunostomy feeding tube 11/07, POD #1.   -The patient on Dilaudid PCA and On-Q ball for pain management.   -Patient n.p.o. Cont to hold prior to admission p.o. medications.   -NG tube in place to LIS,  -IV fluids, lactated Ringer's infusing at 125 mL per hour per primary recommendations.   -PRN Zofran and Compazine   -Telemetry   -Wean O2 as able  -Cont post-op surgical management per thoracic surgery team    Acute blood loss anemia:  Due to above.  ml.  Hgb 13.2 on 09/22.    Recent Labs  Lab 11/07/17  1011   HGB 11.1*     -Cont to monitor      Essential hypertension.   BP is controlled.  PTA: Tenoretic 100-25 mg, one po daily and lisinopril 10 mg po daily    -Cont to hold PTA oral BP meds while NPO  -Cont metoprolol IV q.6 hours.    -PRN hydralazine and labetalol ordered.   -Cont to monitor BP     Hyperlipidemia.   -Cont to hold prior to admission Crestor while NPO.      Diabetes mellitus, type 2, without complication.   PTA:  Glipizide 5 mg po daily and metformin 1000 mg po daily.   Hgb A1c 6.9% (11/08)  BG is controlled.    -Start Lantus 5 units sc daily  -Cont Novolog sliding scale  -Cont to monitor BG  -    Chronic kidney disease, stage III.  Baseline cr recently in the range of 1.3-1.9.  Cr 1.53 on 11/07.      Recent Labs  Lab 11/07/17  1011    CR 1.53*     -Cont to monitor renal function  -Avoid nephrotoxic agents if possible.     Gastroesophageal reflux disease, stable.   -Cont to hold prior to admission omeprazole while NPO.   -Protonix IV daily ordered.      Gout, stable.  -Cont to hold prior to admission allopurinol while NPO.      Deep venous thrombosis prophylaxis.  PCD/Lovenox     CODE STATUS:  Full code.     Disposition: Expected discharge per surgery, pending ongoing management and hospital course.     D/W RN.    Interval History   Surgical pain is relatively well controlled. He denies fever, chest pain, N/V/abdominal pain.  No event overnight.    Data reviewed today: I reviewed all new labs and imaging over the last 24 hours. I personally reviewed the telemetry monitor tracing showing NSR.    Physical Exam   Temp: 99.4  F (37.4  C) Temp src: Oral BP: 108/75   Heart Rate: 86 Resp: 20 SpO2: 93 % O2 Device: Nasal cannula Oxygen Delivery: 2 LPM  Vitals:    11/07/17 1051   Weight: 88.9 kg (196 lb)     Vital Signs with Ranges  Temp:  [96.3  F (35.7  C)-99.4  F (37.4  C)] 99.4  F (37.4  C)  Heart Rate:  [66-98] 86  Resp:  [11-24] 20  BP: (104-150)/() 108/75  SpO2:  [93 %-99 %] 93 %  I/O's Last 24 hours  I/O last 3 completed shifts:  In: 4674 [I.V.:4554; NG/GT:120]  Out: 1580 [Urine:950; Blood:200; Chest Tube:430]    Constitutional: Comfortable, no acute distress  HEENT: +conjunctival pallor.  Neurologic: Awake, alert, fully oriented  Neck: Supple, no elevated JVP  Respiratory: Bibasilar crackles/coarse breath sounds  Cardiovascular: Normal S1 and S2. Regular rhythm and rate, no murmur  GI: Abdomen soft, not tender, decreased bowel sounds diffusely  Extremities: No calf tenderness, no significant LE edema  Skin/integument: No acute rash, no cyanosis    Medications   All medications were reviewed.    - MEDICATION INSTRUCTIONS -       lactated ringers 125 mL/hr at 11/08/17 4949     disposable pump w/ anesthetic ([facility]) 4 mL/hr at 11/07/17 4770     -  MEDICATION INSTRUCTIONS -         sodium chloride (PF)  3 mL Intracatheter Q8H     bacitracin   Topical TID     sodium chloride (PF)  3 mL Intracatheter Q8H     enoxaparin  40 mg Subcutaneous Q24H     metoprolol  5 mg Intravenous Q6H     HYDROmorphone   Intravenous PCA     pantoprazole  40 mg Intravenous QAM AC     insulin aspart  1-6 Units Subcutaneous Q4H        Data     Recent Labs  Lab 11/08/17  0715 11/07/17  1011   WBC  --  5.2   HGB  --  11.1*   MCV  --  98   PLT  --  222   INR  --  0.90   NA  --  137   POTASSIUM 4.2 3.8   CHLORIDE  --  105   CO2  --  26   BUN  --  28   CR  --  1.53*   ANIONGAP  --  6   ELIANA  --  9.6   GLC  --  137*       Recent Results (from the past 24 hour(s))   XR Chest Port 1 View    Narrative    PORTABLE CHEST ONE VIEW   11/7/2017 4:20 PM    HISTORY: Postoperative evaluation.    COMPARISON: A preoperative CT of the chest on 9/22/2017.      Impression    IMPRESSION: A nasogastric tube is seen entering the stomach. The  distal tip is not clearly identified. There is a right-sided chest  tube or mediastinal drain. No pneumothorax is seen. There appears to  be a small amount of gas under the diaphragm which is likely  postoperative. There is atelectasis of both lower lungs. No other  abnormality is seen.     DANIELLE PITTMAN MD   XR Chest Port 1 View    Narrative    XR CHEST PORT 1 VW  11/8/2017 6:06 AM     HISTORY:  post-op esophagectomy;     COMPARISON: Film dated 11/7    FINDINGS:  NG tube is in place. Right chest tube is seen. No  pneumothorax is seen. Pneumoperitoneum is again noted. Mild  atelectasis right lung base.      Impression    IMPRESSION: Stable, no significant change. Pneumoperitoneum is again  noted consistent with the recent surgical procedure.    CHRISTINE PIERRE MD

## 2017-11-08 NOTE — PLAN OF CARE
Problem: Patient Care Overview  Goal: Plan of Care/Patient Progress Review  Outcome: Improving  Pt is A&O x 4, AVSS sats mid 90's on 2 liters. Sinus rhythm. Chest tube patent. Oneal patent. Pain control with PCA dilaudid. NG to LIS, zero out scant brown amt in tubing. Jejunostomy tube clamped. No nausea. Absent bowel sounds. Abdomen right upper back Incisions covered, no drainage noted. IS up to 1000. HOB 30 degrees.

## 2017-11-08 NOTE — PLAN OF CARE
Problem: Patient Care Overview  Goal: Plan of Care/Patient Progress Review  Outcome: Improving  A/Ox4, AVSS, strict NPO, A-1 with walker.Ambulated halls X3, up to chair most of day. Right upper back and midline abd incisions covered with steri strips, CDO/PONCHO. CT with serosanguinous output to -20 suction, no crepitus/AL. NG to LIS, minimal output. OnQ in place, sensors taped, clamps open. J-tube patent, flushed, clamped.gomez removed DTV, 1230 bladder scan 137. Pain controlled with pca/tylenol. LS diminished IS to 750, flutter valve done appropriately.

## 2017-11-09 ENCOUNTER — APPOINTMENT (OUTPATIENT)
Dept: GENERAL RADIOLOGY | Facility: CLINIC | Age: 72
DRG: 327 | End: 2017-11-09
Attending: PHYSICIAN ASSISTANT
Payer: MEDICARE

## 2017-11-09 LAB
ANION GAP SERPL CALCULATED.3IONS-SCNC: 8 MMOL/L (ref 3–14)
BUN SERPL-MCNC: 31 MG/DL (ref 7–30)
CALCIUM SERPL-MCNC: 8.7 MG/DL (ref 8.5–10.1)
CHLORIDE SERPL-SCNC: 105 MMOL/L (ref 94–109)
CO2 SERPL-SCNC: 27 MMOL/L (ref 20–32)
COPATH REPORT: NORMAL
CREAT SERPL-MCNC: 1.52 MG/DL (ref 0.66–1.25)
ERYTHROCYTE [DISTWIDTH] IN BLOOD BY AUTOMATED COUNT: 21 % (ref 10–15)
GFR SERPL CREATININE-BSD FRML MDRD: 45 ML/MIN/1.7M2
GLUCOSE BLDC GLUCOMTR-MCNC: 111 MG/DL (ref 70–99)
GLUCOSE BLDC GLUCOMTR-MCNC: 133 MG/DL (ref 70–99)
GLUCOSE BLDC GLUCOMTR-MCNC: 135 MG/DL (ref 70–99)
GLUCOSE BLDC GLUCOMTR-MCNC: 142 MG/DL (ref 70–99)
GLUCOSE BLDC GLUCOMTR-MCNC: 142 MG/DL (ref 70–99)
GLUCOSE BLDC GLUCOMTR-MCNC: 160 MG/DL (ref 70–99)
GLUCOSE BLDC GLUCOMTR-MCNC: 166 MG/DL (ref 70–99)
GLUCOSE SERPL-MCNC: 145 MG/DL (ref 70–99)
HCT VFR BLD AUTO: 27.2 % (ref 40–53)
HGB BLD-MCNC: 9.3 G/DL (ref 13.3–17.7)
MCH RBC QN AUTO: 34.7 PG (ref 26.5–33)
MCHC RBC AUTO-ENTMCNC: 34.2 G/DL (ref 31.5–36.5)
MCV RBC AUTO: 102 FL (ref 78–100)
PLATELET # BLD AUTO: 181 10E9/L (ref 150–450)
POTASSIUM SERPL-SCNC: 4.1 MMOL/L (ref 3.4–5.3)
RBC # BLD AUTO: 2.68 10E12/L (ref 4.4–5.9)
SODIUM SERPL-SCNC: 140 MMOL/L (ref 133–144)
WBC # BLD AUTO: 8.6 10E9/L (ref 4–11)

## 2017-11-09 PROCEDURE — 40000274 ZZH STATISTIC RCP CONSULT EA 30 MIN

## 2017-11-09 PROCEDURE — 25000128 H RX IP 250 OP 636: Performed by: PHYSICIAN ASSISTANT

## 2017-11-09 PROCEDURE — 94799 UNLISTED PULMONARY SVC/PX: CPT

## 2017-11-09 PROCEDURE — 99232 SBSQ HOSP IP/OBS MODERATE 35: CPT | Performed by: INTERNAL MEDICINE

## 2017-11-09 PROCEDURE — 00000146 ZZHCL STATISTIC GLUCOSE BY METER IP

## 2017-11-09 PROCEDURE — 40000275 ZZH STATISTIC RCP TIME EA 10 MIN

## 2017-11-09 PROCEDURE — 36415 COLL VENOUS BLD VENIPUNCTURE: CPT | Performed by: NURSE PRACTITIONER

## 2017-11-09 PROCEDURE — 85027 COMPLETE CBC AUTOMATED: CPT | Performed by: NURSE PRACTITIONER

## 2017-11-09 PROCEDURE — 21400002 ZZH R&B CCU CICU CRITICAL

## 2017-11-09 PROCEDURE — 71010 XR CHEST PORT 1 VW: CPT

## 2017-11-09 PROCEDURE — 80048 BASIC METABOLIC PNL TOTAL CA: CPT | Performed by: NURSE PRACTITIONER

## 2017-11-09 PROCEDURE — 25000125 ZZHC RX 250: Performed by: PHYSICIAN ASSISTANT

## 2017-11-09 PROCEDURE — 25000131 ZZH RX MED GY IP 250 OP 636 PS 637: Mod: GY | Performed by: INTERNAL MEDICINE

## 2017-11-09 RX ADMIN — SODIUM CHLORIDE, POTASSIUM CHLORIDE, SODIUM LACTATE AND CALCIUM CHLORIDE: 600; 310; 30; 20 INJECTION, SOLUTION INTRAVENOUS at 17:29

## 2017-11-09 RX ADMIN — METOPROLOL TARTRATE 5 MG: 5 INJECTION INTRAVENOUS at 01:57

## 2017-11-09 RX ADMIN — SODIUM CHLORIDE, POTASSIUM CHLORIDE, SODIUM LACTATE AND CALCIUM CHLORIDE: 600; 310; 30; 20 INJECTION, SOLUTION INTRAVENOUS at 03:42

## 2017-11-09 RX ADMIN — BACITRACIN: 500 OINTMENT TOPICAL at 21:13

## 2017-11-09 RX ADMIN — PANTOPRAZOLE SODIUM 40 MG: 40 INJECTION, POWDER, FOR SOLUTION INTRAVENOUS at 07:48

## 2017-11-09 RX ADMIN — INSULIN ASPART 1 UNITS: 100 INJECTION, SOLUTION INTRAVENOUS; SUBCUTANEOUS at 12:43

## 2017-11-09 RX ADMIN — ENOXAPARIN SODIUM 40 MG: 40 INJECTION SUBCUTANEOUS at 08:47

## 2017-11-09 RX ADMIN — INSULIN ASPART 1 UNITS: 100 INJECTION, SOLUTION INTRAVENOUS; SUBCUTANEOUS at 03:58

## 2017-11-09 RX ADMIN — BACITRACIN: 500 OINTMENT TOPICAL at 10:05

## 2017-11-09 RX ADMIN — INSULIN GLARGINE 5 UNITS: 100 INJECTION, SOLUTION SUBCUTANEOUS at 08:13

## 2017-11-09 RX ADMIN — INSULIN ASPART 1 UNITS: 100 INJECTION, SOLUTION INTRAVENOUS; SUBCUTANEOUS at 00:25

## 2017-11-09 RX ADMIN — METOPROLOL TARTRATE 5 MG: 5 INJECTION INTRAVENOUS at 14:14

## 2017-11-09 RX ADMIN — METOPROLOL TARTRATE 5 MG: 5 INJECTION INTRAVENOUS at 21:13

## 2017-11-09 RX ADMIN — SODIUM CHLORIDE, POTASSIUM CHLORIDE, SODIUM LACTATE AND CALCIUM CHLORIDE: 600; 310; 30; 20 INJECTION, SOLUTION INTRAVENOUS at 11:07

## 2017-11-09 RX ADMIN — METOPROLOL TARTRATE 5 MG: 5 INJECTION INTRAVENOUS at 08:47

## 2017-11-09 NOTE — PLAN OF CARE
Problem: Patient Care Overview  Goal: Plan of Care/Patient Progress Review  Outcome: No Change  Vs stable, ambulated 3 times on evenings with 1 and walker, CT intact with no AL or crepitus, On Q infusing with sensors taped and unclamped, NG at 53 cm to LIS, J tube flushed per order, pain controlled with pca, pt DTV bladder scan at 2200 was 341 pt comfortable, incisions intact with steri strips, encouraged IS and flutter valve.

## 2017-11-09 NOTE — PROGRESS NOTES
[facility]  Hospitalist Progress Note  Fritz Rubio MD    Assessment & Plan   Mr. Ramon Winter is a 71-year-old gentleman with past medical history significant for DM2, adenocarcinoma of the distal esophagus, gastroesophageal reflux disease, gout, hyperlipidemia, essential hypertension, chronic kidney disease stage III, benign prostatic hyperplasia, and anxiety who underwent elective esophagogastrectomy and placement of jejunostomy feeding tube on 11/07.  Hospitalsit Service is consulted to assist with medical management.    Adenocarcinoma of distal esophagus, status post chemotherapy.  Status post esophagogastrectomy and placement of jejunostomy feeding tube 11/07, POD #2.   -Cont post-op surgical management per thoracic surgery team  -Patient n.p.o. Cont to hold prior to admission p.o. medications.   -Telemetry   -Wean O2 as able    Acute blood loss anemia:  Due to above.  ml.  Hgb 13.2 on 09/22.    Recent Labs  Lab 11/09/17  0618 11/07/17  1011   HGB 9.3* 11.1*     -Cont to monitor Hgb intermittently     Essential hypertension.   BP is presently controlled.  PTA: Tenoretic 100-25 mg, one po daily and lisinopril 10 mg po daily  BP is currently controlled.    -Cont to hold PTA oral BP meds while NPO  -Cont metoprolol IV q.6 hours.    -PRN hydralazine and labetalol ordered.   -Cont to monitor BP     Diabetes mellitus, type 2, without complication.   PTA:  Glipizide 5 mg po daily and metformin 1000 mg po daily.   Hgb A1c 6.9% (11/08)  BG is controlled.    -Cont to hold PTA oral hypoglycemic agents  -Cont Lantus 5 units sc daily, started 11/08  -Cont Novolog sliding scale  -Cont to monitor BG  -    Chronic kidney disease, stage III.  Baseline cr recently in the range of 1.3-1.9.  Cr stable around 1.5.      Recent Labs  Lab 11/09/17  0618 11/07/17  1011   CR 1.52* 1.53*     -Cont to monitor renal function periodically  -Avoid nephrotoxic agents if possible.     Hyperlipidemia.   -Cont  to hold prior to admission Crestor while NPO.      Gastroesophageal reflux disease, stable.   -Cont to hold prior to admission omeprazole while NPO.   -Protonix 40 mg IV daily.     Gout, stable.  -Cont to hold prior to admission allopurinol while NPO.      Deep venous thrombosis prophylaxis.  PCD/Lovenox     CODE STATUS:  Full code.     Disposition: Expected discharge per surgery, pending ongoing management and hospital course.     D/W RN and thoracic surgery.    Interval History   Surgical pain is manageable. IV fluid increased this am for low urine output. He denies fever, N/V/abdominal pain.    Data reviewed today: I reviewed all new labs and imaging over the last 24 hours. I personally reviewed the telemetry monitor tracing showing NSR.    Physical Exam   Temp: 99.9  F (37.7  C) Temp src: Oral BP: 144/85 Pulse: 90 Heart Rate: 91 Resp: 18 SpO2: 92 % O2 Device: None (Room air)    Vitals:    11/07/17 1051   Weight: 88.9 kg (196 lb)     Vital Signs with Ranges  Temp:  [97.8  F (36.6  C)-99.9  F (37.7  C)] 99.9  F (37.7  C)  Pulse:  [90-97] 90  Heart Rate:  [91-99] 91  Resp:  [16-18] 18  BP: (105-163)/(77-97) 144/85  SpO2:  [86 %-95 %] 92 %  I/O's Last 24 hours  I/O last 3 completed shifts:  In: 3143 [I.V.:2963; NG/GT:180]  Out: 1040 [Urine:400; Emesis/NG output:310; Chest Tube:330]    Constitutional: Comfortable, no acute distress  HEENT: +conjunctival pallor.  Neurologic: Awake, alert, fully oriented  Neck: Supple, no elevated JVP  Respiratory: Bibasilar crackles/coarse breath sounds, R>L  Cardiovascular: Normal S1 and S2. Regular rhythm and rate, no murmur  GI: Abdomen soft, not tender, decreased bowel sounds diffusely  Extremities: No calf tenderness, no significant LE edema  Skin/integument: No acute rash, no cyanosis    Medications   All medications were reviewed.    - MEDICATION INSTRUCTIONS -       lactated ringers 125 mL/hr at 11/09/17 0342     disposable pump w/ anesthetic (FRH) 4 mL/hr at 11/07/17 7343      - MEDICATION INSTRUCTIONS -         lidocaine 2 %  10 mL Urethral Once     insulin glargine  5 Units Subcutaneous QAM AC     sodium chloride (PF)  3 mL Intracatheter Q8H     bacitracin   Topical TID     sodium chloride (PF)  3 mL Intracatheter Q8H     enoxaparin  40 mg Subcutaneous Q24H     metoprolol  5 mg Intravenous Q6H     HYDROmorphone   Intravenous PCA     pantoprazole  40 mg Intravenous QAM AC     insulin aspart  1-6 Units Subcutaneous Q4H        Data     Recent Labs  Lab 11/09/17  0618 11/08/17  0715 11/07/17  1011   WBC 8.6  --  5.2   HGB 9.3*  --  11.1*   *  --  98     --  222   INR  --   --  0.90     --  137   POTASSIUM 4.1 4.2 3.8   CHLORIDE 105  --  105   CO2 27  --  26   BUN 31*  --  28   CR 1.52*  --  1.53*   ANIONGAP 8  --  6   ELIANA 8.7  --  9.6   *  --  137*       Recent Results (from the past 24 hour(s))   XR Chest Port 1 View    Narrative    XR CHEST PORT 1 VW  11/9/2017 5:35 AM     HISTORY:  post-op esophagectomy;     COMPARISON: Film dated 1/8/2017    FINDINGS:  NG tube is in place. Right chest tube is seen. Air is again  noted beneath the right diaphragm. This is consistent with the  patient's recent abdominal surgery. Mild basilar atelectasis.      Impression    IMPRESSION: Stable, no pneumothorax. No change in the  pneumoperitoneum.

## 2017-11-09 NOTE — PROGRESS NOTES
[facility]AT Note    Date:11/7/17  Admission Diagnosis: Status post esophagogastrectomy  Pulmonary History:None  Home Nebulizer/ MDI Use:None  Home Oxygen Use:None  Acuity Level (from RT Assessment flow sheet):3    Aerosol Therapy Initiated:None      Pulmonary Hygiene Initiated:Acapella QID/prn. Pt has a weak productive cough.      Volume Expansion Therapy Initiated:IS at beside.      Current Oxygen Requirement:RA  Current SpO2:93%    Re-evaluation date:11/12/17    See 'RT Assessments' flow sheet for patient assessment scoring and Acuity Level Details.  11/9/2017  Keeley Lau RRT

## 2017-11-09 NOTE — PLAN OF CARE
Problem: Patient Care Overview  Goal: Plan of Care/Patient Progress Review  VSS except O2 sats in low 90's on RA. IS, flutter valve. Diminished LS. Pain managed with PCA and onQ. OnQ sensors taped and clamp opened. Thoracotomy incision intact. Abdominal incision intact. Steri strips, open to room air. CT dressing was changed, CDI, to -20 suction, no AL, no crepitus, 100cc serosanguinous output. J tube flushed and clamped. NG secured, LIS with 150cc clear green output. Voiding. Up with x1 assist. Strict NPO. Hypo BS. Denies flatus.

## 2017-11-09 NOTE — PLAN OF CARE
Problem: Patient Care Overview  Goal: Plan of Care/Patient Progress Review  Outcome: No Change  VSS, except O2 sats in mid-high 80's. IS, flutter valve, walked x2, O2 sat up to 90%. Pain managed with PCA. Thoracotomy incision intact. Abdominal incision intact. CT site CDI, to suction, no AL, no crepitus, 110 cc serous yellow output. OnQ intact, sensors taped and clamps open. NG to LIS with 150 clear green brown output. J tube clamped, flushed 60 ml. Voiding. Up assist x1. Strict NPO. Hypo BS, denies flatus. LS diminished.

## 2017-11-09 NOTE — PROGRESS NOTES
"Thoracic Surgery POD #2:  /85 (BP Location: Right arm)  Pulse 90  Temp 99.9  F (37.7  C) (Oral)  Resp 18  Ht 1.753 m (5' 9\")  Wt 88.9 kg (196 lb)  SpO2 92%  BMI 28.94 kg/m2  UOP: borderline, 250 on night shift  CT:  330 cc past 24 hours  S:  Has discomfort but is managed with PCA. Hurts to cough. Walking, no flatus, no nausea, using flutter valve and IS. Discussed importance of pulmonary toilet.   O:  Abdomen: soft, incision benign, jejunostomy intact  Thoracotomy incison without erythema, dry, steris intact  On-Q: infusing  CT:  no air leak, no bleeding  NGT: yellowish output, 180 cc over 24 hours  P:  Add RT consult to work on pulmonary toilet-- has been 88-90% on room air  Strict NPO, NGT to LIS  CT to suction  Increase IVF from 125 to 150 cc/hour    Elaine Su PA-C with Dr. Maximilian Jeter  [facility]  Cell [phone]      "

## 2017-11-10 ENCOUNTER — APPOINTMENT (OUTPATIENT)
Dept: GENERAL RADIOLOGY | Facility: CLINIC | Age: 72
DRG: 327 | End: 2017-11-10
Attending: PHYSICIAN ASSISTANT
Payer: MEDICARE

## 2017-11-10 LAB
ANION GAP SERPL CALCULATED.3IONS-SCNC: 8 MMOL/L (ref 3–14)
BUN SERPL-MCNC: 21 MG/DL (ref 7–30)
CALCIUM SERPL-MCNC: 8.6 MG/DL (ref 8.5–10.1)
CHLORIDE SERPL-SCNC: 104 MMOL/L (ref 94–109)
CO2 SERPL-SCNC: 26 MMOL/L (ref 20–32)
CREAT SERPL-MCNC: 1.21 MG/DL (ref 0.66–1.25)
GFR SERPL CREATININE-BSD FRML MDRD: 59 ML/MIN/1.7M2
GLUCOSE BLDC GLUCOMTR-MCNC: 119 MG/DL (ref 70–99)
GLUCOSE BLDC GLUCOMTR-MCNC: 121 MG/DL (ref 70–99)
GLUCOSE BLDC GLUCOMTR-MCNC: 124 MG/DL (ref 70–99)
GLUCOSE BLDC GLUCOMTR-MCNC: 131 MG/DL (ref 70–99)
GLUCOSE BLDC GLUCOMTR-MCNC: 133 MG/DL (ref 70–99)
GLUCOSE SERPL-MCNC: 129 MG/DL (ref 70–99)
HGB BLD-MCNC: 8.7 G/DL (ref 13.3–17.7)
MAGNESIUM SERPL-MCNC: 1.5 MG/DL (ref 1.6–2.3)
MAGNESIUM SERPL-MCNC: 2.3 MG/DL (ref 1.6–2.3)
PLATELET # BLD AUTO: 170 10E9/L (ref 150–450)
POTASSIUM SERPL-SCNC: 3.4 MMOL/L (ref 3.4–5.3)
SODIUM SERPL-SCNC: 138 MMOL/L (ref 133–144)

## 2017-11-10 PROCEDURE — 25000131 ZZH RX MED GY IP 250 OP 636 PS 637: Mod: GY | Performed by: INTERNAL MEDICINE

## 2017-11-10 PROCEDURE — 36415 COLL VENOUS BLD VENIPUNCTURE: CPT | Performed by: INTERNAL MEDICINE

## 2017-11-10 PROCEDURE — 25000128 H RX IP 250 OP 636: Performed by: THORACIC SURGERY (CARDIOTHORACIC VASCULAR SURGERY)

## 2017-11-10 PROCEDURE — 00000146 ZZHCL STATISTIC GLUCOSE BY METER IP

## 2017-11-10 PROCEDURE — 80048 BASIC METABOLIC PNL TOTAL CA: CPT | Performed by: INTERNAL MEDICINE

## 2017-11-10 PROCEDURE — 71010 XR CHEST PORT 1 VW: CPT

## 2017-11-10 PROCEDURE — 85049 AUTOMATED PLATELET COUNT: CPT | Performed by: INTERNAL MEDICINE

## 2017-11-10 PROCEDURE — 83735 ASSAY OF MAGNESIUM: CPT | Performed by: INTERNAL MEDICINE

## 2017-11-10 PROCEDURE — 25000128 H RX IP 250 OP 636: Performed by: PHYSICIAN ASSISTANT

## 2017-11-10 PROCEDURE — 85018 HEMOGLOBIN: CPT | Performed by: INTERNAL MEDICINE

## 2017-11-10 PROCEDURE — 82565 ASSAY OF CREATININE: CPT | Performed by: INTERNAL MEDICINE

## 2017-11-10 PROCEDURE — 25000125 ZZHC RX 250: Performed by: PHYSICIAN ASSISTANT

## 2017-11-10 PROCEDURE — 12000007 ZZH R&B INTERMEDIATE

## 2017-11-10 PROCEDURE — 25000128 H RX IP 250 OP 636: Performed by: INTERNAL MEDICINE

## 2017-11-10 PROCEDURE — 99232 SBSQ HOSP IP/OBS MODERATE 35: CPT | Performed by: INTERNAL MEDICINE

## 2017-11-10 RX ADMIN — ENOXAPARIN SODIUM 40 MG: 40 INJECTION SUBCUTANEOUS at 09:08

## 2017-11-10 RX ADMIN — METOPROLOL TARTRATE 5 MG: 5 INJECTION INTRAVENOUS at 20:37

## 2017-11-10 RX ADMIN — METOPROLOL TARTRATE 5 MG: 5 INJECTION INTRAVENOUS at 09:08

## 2017-11-10 RX ADMIN — METOPROLOL TARTRATE 5 MG: 5 INJECTION INTRAVENOUS at 02:12

## 2017-11-10 RX ADMIN — INSULIN GLARGINE 5 UNITS: 100 INJECTION, SOLUTION SUBCUTANEOUS at 09:07

## 2017-11-10 RX ADMIN — SODIUM CHLORIDE, POTASSIUM CHLORIDE, SODIUM LACTATE AND CALCIUM CHLORIDE: 600; 310; 30; 20 INJECTION, SOLUTION INTRAVENOUS at 17:26

## 2017-11-10 RX ADMIN — BACITRACIN: 500 OINTMENT TOPICAL at 13:07

## 2017-11-10 RX ADMIN — PANTOPRAZOLE SODIUM 40 MG: 40 INJECTION, POWDER, FOR SOLUTION INTRAVENOUS at 07:04

## 2017-11-10 RX ADMIN — METOPROLOL TARTRATE 5 MG: 5 INJECTION INTRAVENOUS at 14:35

## 2017-11-10 RX ADMIN — MAGNESIUM SULFATE IN WATER 4 G: 40 INJECTION, SOLUTION INTRAVENOUS at 13:26

## 2017-11-10 RX ADMIN — BACITRACIN: 500 OINTMENT TOPICAL at 00:23

## 2017-11-10 RX ADMIN — HYDROMORPHONE HYDROCHLORIDE: 10 INJECTION, SOLUTION INTRAMUSCULAR; INTRAVENOUS; SUBCUTANEOUS at 07:01

## 2017-11-10 NOTE — PLAN OF CARE
Problem: Patient Care Overview  Goal: Plan of Care/Patient Progress Review  Outcome: No Change  Vs stable, ambulating with 1 and walker, voiding, pain controlled with pca, CT intact with no AL or crepitus, J tube clamped, NG to LIS, IS and flutter valve encouraged, on Q infusing with sensors taped and unclamped.

## 2017-11-10 NOTE — PLAN OF CARE
Problem: Patient Care Overview  Goal: Plan of Care/Patient Progress Review  Outcome: Improving  A&Ox4. VSS. Ex. Tachy. LS diminished, coarse in RLL. CT patent, no air leak, no crepitus. On-Q in place. IS 1000. J-tube patent and clamped. Flushed qshift. NG to LIS, with minimal output, remains in place at 51cm. BS hypoactive, not passing flatus. Pain moderately controlled with PCA, needs frequent reminders to push button. Dressing CDI. Remains Strict NPO. No coverage needed for BGs. Voiding adequately. A1 walker/gb.

## 2017-11-10 NOTE — PROGRESS NOTES
[facility]  Hospitalist Progress Note  Fritz Rubio MD    Assessment & Plan   Mr. Ramon Winter is a 71-year-old gentleman with past medical history significant for DM2, adenocarcinoma of the distal esophagus, gastroesophageal reflux disease, gout, hyperlipidemia, essential hypertension, chronic kidney disease stage III, benign prostatic hyperplasia, and anxiety who underwent elective esophagogastrectomy and placement of jejunostomy feeding tube on 11/07.  Hospitalsit Service is consulted to assist with medical management.    Adenocarcinoma of distal esophagus, status post chemotherapy.  Status post esophagogastrectomy and placement of jejunostomy feeding tube 11/07, POD #3.   -Cont post-op surgical management per thoracic surgery team  -Cont n.p.o.   -Cont IVF    Acute blood loss anemia:  Due to above.  ml.  Hgb 13.2 on 09/22.    Recent Labs  Lab 11/10/17  0708 11/09/17 0618 11/07/17  1011   HGB 8.7* 9.3* 11.1*     -Cont to monitor Hgb intermittently     Essential hypertension.   PTA: Tenoretic 100-25 mg, one po daily and lisinopril 10 mg po daily  BP is currently controlled.    -Cont to hold PTA oral BP meds while NPO  -Cont IV metoprolol 5 mg q.6 hours.    -PRN hydralazine and labetalol ordered.   -Cont to monitor BP     Diabetes mellitus, type 2, without complication.   PTA:  Glipizide 5 mg po daily and metformin 1000 mg po daily.   Hgb A1c 6.9% (11/08)  BG is controlled.    -Cont to hold PTA oral hypoglycemic agents  -Cont Lantus 5 units sc daily, started 11/08  -Cont Novolog sliding scale  -Cont to monitor BG  -    Chronic kidney disease, stage III.  Baseline cr recently in the range of 1.3-1.9.  Cr stable.    Recent Labs  Lab 11/10/17  0708 11/09/17  0618 11/07/17  1011   CR 1.21 1.52* 1.53*     -Cont to monitor renal function periodically  -Avoid NASIDs and nephrotoxic agents     Hyperlipidemia.   -Cont to hold prior to admission Crestor while NPO.      Gastroesophageal  reflux disease, stable.   -Cont to hold prior to admission omeprazole while NPO.   -Protonix 40 mg IV daily.     Gout, stable.  -Cont to hold prior to admission allopurinol while NPO.      Deep venous thrombosis prophylaxis.  PCD/Lovenox     CODE STATUS:  Full code.     Disposition: Expected discharge per surgery, pending ongoing management and hospital course.     D/W RN.    Interval History   Surgical pain is manageable. Urine output is picking up. He denies fever, N/V/abdominal pain. No event overnight.    Data reviewed today: I reviewed all new labs and imaging over the last 24 hours.    Physical Exam   Temp: 97.8  F (36.6  C) Temp src: Oral BP: 144/86 Pulse: 88 Heart Rate: 98 Resp: 16 SpO2: 93 % O2 Device: None (Room air)    Vitals:    11/07/17 1051   Weight: 88.9 kg (196 lb)     Vital Signs with Ranges  Temp:  [97.8  F (36.6  C)-98.8  F (37.1  C)] 97.8  F (36.6  C)  Pulse:  [88-96] 88  Heart Rate:  [] 98  Resp:  [16-18] 16  BP: (136-160)/(79-92) 144/86  SpO2:  [90 %-94 %] 93 %  I/O's Last 24 hours  I/O last 3 completed shifts:  In: 3498 [I.V.:3438; NG/GT:60]  Out: 2250 [Urine:1475; Emesis/NG output:400; Chest Tube:375]    Constitutional: Comfortable, no acute distress  HEENT: +conjunctival pallor.  Neurologic: Awake, alert, fully oriented  Neck: Supple, no elevated JVP  Respiratory: Bibasilar crackles/coarse breath sounds, R>L  Cardiovascular: Normal S1 and S2. Regular rhythm and rate, no murmur  GI: Abdomen soft, not tender, decreased bowel sounds diffusely  Extremities: No calf tenderness, no significant LE edema  Skin/integument: No acute rash, no cyanosis    Medications   All medications were reviewed.    - MEDICATION INSTRUCTIONS -       lactated ringers 150 mL/hr at 11/09/17 1729     disposable pump w/ anesthetic (FRH) 4 mL/hr at 11/07/17 8519     - MEDICATION INSTRUCTIONS -         lidocaine 2 %  10 mL Urethral Once     insulin glargine  5 Units Subcutaneous QAM AC     bacitracin   Topical TID      sodium chloride (PF)  3 mL Intracatheter Q8H     enoxaparin  40 mg Subcutaneous Q24H     metoprolol  5 mg Intravenous Q6H     HYDROmorphone   Intravenous PCA     pantoprazole  40 mg Intravenous QAM AC     insulin aspart  1-6 Units Subcutaneous Q4H        Data     Recent Labs  Lab 11/10/17  0708 11/09/17  0618 11/08/17  0715 11/07/17  1011   WBC  --  8.6  --  5.2   HGB 8.7* 9.3*  --  11.1*   MCV  --  102*  --  98    181  --  222   INR  --   --   --  0.90   NA  --  140  --  137   POTASSIUM  --  4.1 4.2 3.8   CHLORIDE  --  105  --  105   CO2  --  27  --  26   BUN  --  31*  --  28   CR  --  1.52*  --  1.53*   ANIONGAP  --  8  --  6   ELIANA  --  8.7  --  9.6   GLC  --  145*  --  137*       Recent Results (from the past 24 hour(s))   XR Chest Port 1 View    Narrative    CHEST ONE VIEW PORTABLE  11/10/2017 5:35 AM     HISTORY: Postop esophagectomy.    COMPARISON: 11/9/2017.      Impression    IMPRESSION: Right apical chest tube remains in place. No pneumothorax  identified. Minimal subcutaneous emphysema over the right chest wall.  Enteric tube appears similar to prior. Hazy right mid and lower lung  opacities are unchanged and may represent atelectasis versus  consolidation. Pneumoperitoneum under the right hemidiaphragm is again  noted. Cardiac silhouette remains enlarged.

## 2017-11-10 NOTE — PROGRESS NOTES
THORACIC SURGERY POD # 3    Doing well  AVSS on RA  Good U/O  Creat down to 1.21  NGT bilious  CT serous no air leak  Wounds ok  Abdomen soft    Hgb 8.7 stable    CXR ok    Decrease IVF to 100 ml/hr  resp care ++  Strict NPO  CT suction  Ambulate    COLT LORENZ MD [facility] ONCOLOGY THORACIC SURGERY  CELL:  [phone]  OFFICE: [phone]

## 2017-11-10 NOTE — PLAN OF CARE
Problem: Patient Care Overview  Goal: Plan of Care/Patient Progress Review  Outcome: No Change  NG to LIS. J-tube clamped. CT to suction, no air leak, no crepitous. Up with 1 assist. Continues strict NPO.

## 2017-11-11 ENCOUNTER — APPOINTMENT (OUTPATIENT)
Dept: GENERAL RADIOLOGY | Facility: CLINIC | Age: 72
DRG: 327 | End: 2017-11-11
Attending: PHYSICIAN ASSISTANT
Payer: MEDICARE

## 2017-11-11 LAB
ANION GAP SERPL CALCULATED.3IONS-SCNC: 10 MMOL/L (ref 3–14)
BUN SERPL-MCNC: 16 MG/DL (ref 7–30)
CALCIUM SERPL-MCNC: 8.5 MG/DL (ref 8.5–10.1)
CHLORIDE SERPL-SCNC: 100 MMOL/L (ref 94–109)
CO2 SERPL-SCNC: 26 MMOL/L (ref 20–32)
CREAT SERPL-MCNC: 1.13 MG/DL (ref 0.66–1.25)
ERYTHROCYTE [DISTWIDTH] IN BLOOD BY AUTOMATED COUNT: 18.8 % (ref 10–15)
GFR SERPL CREATININE-BSD FRML MDRD: 64 ML/MIN/1.7M2
GLUCOSE BLDC GLUCOMTR-MCNC: 113 MG/DL (ref 70–99)
GLUCOSE BLDC GLUCOMTR-MCNC: 114 MG/DL (ref 70–99)
GLUCOSE BLDC GLUCOMTR-MCNC: 91 MG/DL (ref 70–99)
GLUCOSE BLDC GLUCOMTR-MCNC: 93 MG/DL (ref 70–99)
GLUCOSE SERPL-MCNC: 118 MG/DL (ref 70–99)
HCT VFR BLD AUTO: 25.4 % (ref 40–53)
HGB BLD-MCNC: 8.7 G/DL (ref 13.3–17.7)
MCH RBC QN AUTO: 34.1 PG (ref 26.5–33)
MCHC RBC AUTO-ENTMCNC: 34.3 G/DL (ref 31.5–36.5)
MCV RBC AUTO: 100 FL (ref 78–100)
PLATELET # BLD AUTO: 186 10E9/L (ref 150–450)
POTASSIUM SERPL-SCNC: 3 MMOL/L (ref 3.4–5.3)
RBC # BLD AUTO: 2.55 10E12/L (ref 4.4–5.9)
SODIUM SERPL-SCNC: 136 MMOL/L (ref 133–144)
WBC # BLD AUTO: 5.9 10E9/L (ref 4–11)

## 2017-11-11 PROCEDURE — 12000007 ZZH R&B INTERMEDIATE

## 2017-11-11 PROCEDURE — 00000146 ZZHCL STATISTIC GLUCOSE BY METER IP

## 2017-11-11 PROCEDURE — 36415 COLL VENOUS BLD VENIPUNCTURE: CPT | Performed by: INTERNAL MEDICINE

## 2017-11-11 PROCEDURE — 99232 SBSQ HOSP IP/OBS MODERATE 35: CPT | Performed by: INTERNAL MEDICINE

## 2017-11-11 PROCEDURE — 25000128 H RX IP 250 OP 636: Performed by: INTERNAL MEDICINE

## 2017-11-11 PROCEDURE — 25000128 H RX IP 250 OP 636: Performed by: PHYSICIAN ASSISTANT

## 2017-11-11 PROCEDURE — 25000128 H RX IP 250 OP 636: Performed by: THORACIC SURGERY (CARDIOTHORACIC VASCULAR SURGERY)

## 2017-11-11 PROCEDURE — 25000131 ZZH RX MED GY IP 250 OP 636 PS 637: Mod: GY | Performed by: INTERNAL MEDICINE

## 2017-11-11 PROCEDURE — 85027 COMPLETE CBC AUTOMATED: CPT | Performed by: INTERNAL MEDICINE

## 2017-11-11 PROCEDURE — 80048 BASIC METABOLIC PNL TOTAL CA: CPT | Performed by: INTERNAL MEDICINE

## 2017-11-11 PROCEDURE — 25000132 ZZH RX MED GY IP 250 OP 250 PS 637: Mod: GY | Performed by: PHYSICIAN ASSISTANT

## 2017-11-11 PROCEDURE — 25000125 ZZHC RX 250: Performed by: PHYSICIAN ASSISTANT

## 2017-11-11 PROCEDURE — 71010 XR CHEST PORT 1 VW: CPT

## 2017-11-11 PROCEDURE — A9270 NON-COVERED ITEM OR SERVICE: HCPCS | Mod: GY | Performed by: PHYSICIAN ASSISTANT

## 2017-11-11 PROCEDURE — 25000125 ZZHC RX 250: Performed by: INTERNAL MEDICINE

## 2017-11-11 RX ORDER — METOPROLOL TARTRATE 1 MG/ML
7.5 INJECTION, SOLUTION INTRAVENOUS EVERY 6 HOURS
Status: DISCONTINUED | OUTPATIENT
Start: 2017-11-11 | End: 2017-11-12

## 2017-11-11 RX ORDER — LISINOPRIL 10 MG/1
10 TABLET ORAL DAILY
Status: DISCONTINUED | OUTPATIENT
Start: 2017-11-11 | End: 2017-11-11

## 2017-11-11 RX ADMIN — Medication 10 MEQ: at 11:59

## 2017-11-11 RX ADMIN — Medication 10 MEQ: at 14:40

## 2017-11-11 RX ADMIN — INSULIN GLARGINE 5 UNITS: 100 INJECTION, SOLUTION SUBCUTANEOUS at 09:10

## 2017-11-11 RX ADMIN — ACETAMINOPHEN 650 MG: 325 SOLUTION ORAL at 09:07

## 2017-11-11 RX ADMIN — ENOXAPARIN SODIUM 40 MG: 40 INJECTION SUBCUTANEOUS at 09:09

## 2017-11-11 RX ADMIN — PANTOPRAZOLE SODIUM 40 MG: 40 INJECTION, POWDER, FOR SOLUTION INTRAVENOUS at 06:35

## 2017-11-11 RX ADMIN — HYDROMORPHONE HYDROCHLORIDE: 10 INJECTION, SOLUTION INTRAMUSCULAR; INTRAVENOUS; SUBCUTANEOUS at 14:34

## 2017-11-11 RX ADMIN — METOPROLOL TARTRATE 5 MG: 5 INJECTION INTRAVENOUS at 01:38

## 2017-11-11 RX ADMIN — SODIUM CHLORIDE, POTASSIUM CHLORIDE, SODIUM LACTATE AND CALCIUM CHLORIDE: 600; 310; 30; 20 INJECTION, SOLUTION INTRAVENOUS at 19:42

## 2017-11-11 RX ADMIN — BACITRACIN: 500 OINTMENT TOPICAL at 09:09

## 2017-11-11 RX ADMIN — Medication 10 MEQ: at 10:56

## 2017-11-11 RX ADMIN — METOPROLOL TARTRATE 5 MG: 5 INJECTION INTRAVENOUS at 09:09

## 2017-11-11 RX ADMIN — METOPROLOL TARTRATE 5 MG: 5 INJECTION INTRAVENOUS at 14:35

## 2017-11-11 RX ADMIN — HYDROMORPHONE HYDROCHLORIDE: 10 INJECTION, SOLUTION INTRAMUSCULAR; INTRAVENOUS; SUBCUTANEOUS at 21:53

## 2017-11-11 RX ADMIN — METOPROLOL TARTRATE 7.5 MG: 5 INJECTION INTRAVENOUS at 20:13

## 2017-11-11 NOTE — PROGRESS NOTES
THORACIC SURGERY POD # 4    AVSS  On RA  Bilious NGT output  Removed  CT serous   CXR ok    NPO  CT suction  UGI on Monday    Discussed    COLT LORENZ MD [facility] ONCOLOGY THORACIC SURGERY  CELL:  [phone]  OFFICE: [phone]

## 2017-11-11 NOTE — PROGRESS NOTES
[facility] [place]    Hospitalist Progress Note    Date of Service (when I saw the patient): 11/11/2017    Assessment & Plan   Mr. Ramon Winter is a 71-year-old gentleman with past medical history significant for DM2, adenocarcinoma of the distal esophagus, gastroesophageal reflux disease, gout, hyperlipidemia, essential hypertension, chronic kidney disease stage III, benign prostatic hyperplasia, and anxiety who underwent elective esophagogastrectomy and placement of jejunostomy feeding tube on 11/07. Hospitalist Service is consulted to assist with medical management.     Adenocarcinoma of distal esophagus, status post chemotherapy.  Status post esophagogastrectomy and placement of jejunostomy feeding tube 11/07, POD #4.   -Cont post-op surgical management per thoracic surgery team  -Cont n.p.o., defer to primary diet vs tube feeds     Acute blood loss anemia:  Due to above.  ml.  Hgb 13.2 on 09/22.  -Cont to monitor Hgb intermittently, 8.7 and stable 11/11      Essential hypertension.   PTA: Tenoretic 100-25 mg po daily and lisinopril 10 mg po daily  BP is currently controlled.  -Cont to hold PTA Tenoretic and ACEI  -Not to use feeding tube per surgery  -increased IV metoprolol to 7.5mg q.6 hours with hold parameters due to rising BP's    -PRN hydralazine and labetalol ordered.   -Cont to monitor BP      Diabetes mellitus, type 2, without complication.   PTA:  Glipizide 5 mg po daily and metformin 1000 mg po daily.   Hgb A1c 6.9% (11/08)  BG is controlled.  -Cont to hold PTA oral hypoglycemic agents  -Cont Lantus 5 units sc daily, started 11/08  -Cont Novolog sliding scale  -Cont to monitor BG Q4hs while NPO    Chronic kidney disease, stage III.  Baseline cr recently in the range of 1.3-1.9.  Cr stable.  -Cont to monitor renal function periodically  -Avoid NASIDs and nephrotoxic agents  -K replacement protocol      Hyperlipidemia.   -Cont to hold prior to admission Crestor while NPO.       Gastroesophageal reflux disease, stable.   -Cont to hold prior to admission omeprazole while NPO.   -Protonix 40 mg IV daily.      Gout, stable.  -Cont to hold prior to admission allopurinol while NPO.     DVT Prophylaxis: Defer to primary service  Code Status: Full Code    Disposition: Expected discharge per primary service.    Pam Mckay MD    Interval History   No new complaints. Has pain around surgical sites, improved with PCA. No fevers/chills.     -Data reviewed today: I reviewed all new labs and imaging results over the last 24 hours. I personally reviewed no images or EKG's today.    Physical Exam   Temp: 97.8  F (36.6  C) Temp src: Oral BP: (!) 146/91   Heart Rate: 87 Resp: 16 SpO2: 94 % O2 Device: None (Room air)    Vitals:    11/07/17 1051   Weight: 88.9 kg (196 lb)     Vital Signs with Ranges  Temp:  [97.8  F (36.6  C)-99.2  F (37.3  C)] 97.8  F (36.6  C)  Heart Rate:  [87-98] 87  Resp:  [16-17] 16  BP: (136-148)/(80-91) 146/91  SpO2:  [92 %-94 %] 94 %  I/O last 3 completed shifts:  In: 2678 [I.V.:2498; NG/GT:180]  Out: 2830 [Urine:2150; Emesis/NG output:400; Chest Tube:280]    Constitutional: Awake, alert, cooperative, no apparent distress  Respiratory: Coarse breath sounds throughout, surgical scars noted anteriorly and posteriorly over thoracic wall, symmetric sounds  Cardiovascular: Regular rate and rhythm, normal S1 and S2, and no murmur noted  GI: Normal bowel sounds, soft, non-distended, non-tender  Skin/Integumen: No rashes, no cyanosis, no edema  Other: Oriented x 3. Follows commands.    Medications     - MEDICATION INSTRUCTIONS -       lactated ringers 100 mL/hr at 11/10/17 1726     disposable pump w/ anesthetic ([facility]H) 4 mL/hr at 11/07/17 2209     - MEDICATION INSTRUCTIONS -         lisinopril  10 mg Oral or Feeding Tube Daily     lidocaine 2 %  10 mL Urethral Once     insulin glargine  5 Units Subcutaneous QAM AC     bacitracin   Topical TID     sodium chloride (PF)  3 mL  Intracatheter Q8H     enoxaparin  40 mg Subcutaneous Q24H     metoprolol  5 mg Intravenous Q6H     HYDROmorphone   Intravenous PCA     pantoprazole  40 mg Intravenous QAM AC     insulin aspart  1-6 Units Subcutaneous Q4H       Data     Recent Labs  Lab 11/11/17  0719 11/10/17  0708 11/09/17  0618  11/07/17  1011   WBC 5.9  --  8.6  --  5.2   HGB 8.7* 8.7* 9.3*  --  11.1*     --  102*  --  98    170 181  --  222   INR  --   --   --   --  0.90    138 140  --  137   POTASSIUM 3.0* 3.4 4.1  < > 3.8   CHLORIDE 100 104 105  --  105   CO2 26 26 27  --  26   BUN 16 21 31*  --  28   CR 1.13 1.21 1.52*  --  1.53*   ANIONGAP 10 8 8  --  6   ELIANA 8.5 8.6 8.7  --  9.6   * 129* 145*  --  137*   < > = values in this interval not displayed.    Recent Results (from the past 24 hour(s))   XR Chest Port 1 View    Narrative    CHEST ONE VIEW PORTABLE 11/11/2017 5:40 AM     HISTORY: Postoperative esophagectomy.      COMPARISON: Chest x-ray 11/10/2017.     FINDINGS: Lungs are clear. No pneumothorax. Right chest tube is  present and unchanged. Nasogastric tube is also stable in position.  Left lung is well expanded and clear. Postsurgical changes are noted  in the right hemithorax. Fullness in the lower mediastinum is stable  and consistent with hiatal hernia.    NAN DOTSON MD

## 2017-11-11 NOTE — PLAN OF CARE
Problem: Surgery Nonspecified (Adult)  Goal: Signs and Symptoms of Listed Potential Problems Will be Absent, Minimized or Managed (Surgery Nonspecified)  Signs and symptoms of listed potential problems will be absent, minimized or managed by discharge/transition of care (reference Surgery Nonspecified (Adult) CPG).   Outcome: No Change  A/O. VSS. CMS intact. LS diminished. CT to -20mmhg, no air leak, no crepitus. IS to 1250. On-q infusing. NG to LIS. J-tube patent. PCA for pain. Up to chair x1. Ambulated in halls assist 1 gait belt and walker. Strict NPO

## 2017-11-11 NOTE — PLAN OF CARE
Problem: Patient Care Overview  Goal: Plan of Care/Patient Progress Review  Outcome: Improving  A/Ox4, VSS, A-1 with walker and GB. Strict NPO, UGI planned for Monday. NG dc'd today. Incisions to right scapula and abd midline covered with steri strips, CDI. CT to -20 suction, moderate serous output, no crepitus or AL. LS dim, IS to 1500, flutter encouraged. BS active minimal flatus. Adequate UOP. ambulated halls several times. Per surgery J-tube not to be used for med administration d/t risk for tube blockage.

## 2017-11-12 ENCOUNTER — APPOINTMENT (OUTPATIENT)
Dept: GENERAL RADIOLOGY | Facility: CLINIC | Age: 72
DRG: 327 | End: 2017-11-12
Attending: PHYSICIAN ASSISTANT
Payer: MEDICARE

## 2017-11-12 ENCOUNTER — APPOINTMENT (OUTPATIENT)
Dept: GENERAL RADIOLOGY | Facility: CLINIC | Age: 72
DRG: 327 | End: 2017-11-12
Attending: THORACIC SURGERY (CARDIOTHORACIC VASCULAR SURGERY)
Payer: MEDICARE

## 2017-11-12 LAB
ANION GAP SERPL CALCULATED.3IONS-SCNC: 9 MMOL/L (ref 3–14)
BUN SERPL-MCNC: 18 MG/DL (ref 7–30)
CALCIUM SERPL-MCNC: 8.7 MG/DL (ref 8.5–10.1)
CHLORIDE SERPL-SCNC: 103 MMOL/L (ref 94–109)
CO2 SERPL-SCNC: 26 MMOL/L (ref 20–32)
CREAT SERPL-MCNC: 1.16 MG/DL (ref 0.66–1.25)
GFR SERPL CREATININE-BSD FRML MDRD: 62 ML/MIN/1.7M2
GLUCOSE BLDC GLUCOMTR-MCNC: 103 MG/DL (ref 70–99)
GLUCOSE BLDC GLUCOMTR-MCNC: 104 MG/DL (ref 70–99)
GLUCOSE BLDC GLUCOMTR-MCNC: 115 MG/DL (ref 70–99)
GLUCOSE BLDC GLUCOMTR-MCNC: 119 MG/DL (ref 70–99)
GLUCOSE BLDC GLUCOMTR-MCNC: 84 MG/DL (ref 70–99)
GLUCOSE BLDC GLUCOMTR-MCNC: 85 MG/DL (ref 70–99)
GLUCOSE BLDC GLUCOMTR-MCNC: 98 MG/DL (ref 70–99)
GLUCOSE SERPL-MCNC: 91 MG/DL (ref 70–99)
HGB BLD-MCNC: 8.1 G/DL (ref 13.3–17.7)
POTASSIUM SERPL-SCNC: 3.1 MMOL/L (ref 3.4–5.3)
POTASSIUM SERPL-SCNC: 3.4 MMOL/L (ref 3.4–5.3)
SODIUM SERPL-SCNC: 138 MMOL/L (ref 133–144)

## 2017-11-12 PROCEDURE — 25000125 ZZHC RX 250: Performed by: PHYSICIAN ASSISTANT

## 2017-11-12 PROCEDURE — 99232 SBSQ HOSP IP/OBS MODERATE 35: CPT | Performed by: INTERNAL MEDICINE

## 2017-11-12 PROCEDURE — 00000146 ZZHCL STATISTIC GLUCOSE BY METER IP

## 2017-11-12 PROCEDURE — 25000128 H RX IP 250 OP 636: Performed by: INTERNAL MEDICINE

## 2017-11-12 PROCEDURE — 84132 ASSAY OF SERUM POTASSIUM: CPT | Performed by: INTERNAL MEDICINE

## 2017-11-12 PROCEDURE — 36415 COLL VENOUS BLD VENIPUNCTURE: CPT | Performed by: INTERNAL MEDICINE

## 2017-11-12 PROCEDURE — 40000986 XR UPPER GI WATER SOLUBLE

## 2017-11-12 PROCEDURE — 25000128 H RX IP 250 OP 636: Performed by: PHYSICIAN ASSISTANT

## 2017-11-12 PROCEDURE — 25000132 ZZH RX MED GY IP 250 OP 250 PS 637: Mod: GY | Performed by: PHYSICIAN ASSISTANT

## 2017-11-12 PROCEDURE — A9270 NON-COVERED ITEM OR SERVICE: HCPCS | Mod: GY | Performed by: PHYSICIAN ASSISTANT

## 2017-11-12 PROCEDURE — 85018 HEMOGLOBIN: CPT | Performed by: INTERNAL MEDICINE

## 2017-11-12 PROCEDURE — 25000125 ZZHC RX 250: Performed by: INTERNAL MEDICINE

## 2017-11-12 PROCEDURE — 80048 BASIC METABOLIC PNL TOTAL CA: CPT | Performed by: INTERNAL MEDICINE

## 2017-11-12 PROCEDURE — 12000007 ZZH R&B INTERMEDIATE

## 2017-11-12 PROCEDURE — 71010 XR CHEST PORT 1 VW: CPT

## 2017-11-12 PROCEDURE — 25000128 H RX IP 250 OP 636: Performed by: THORACIC SURGERY (CARDIOTHORACIC VASCULAR SURGERY)

## 2017-11-12 RX ORDER — METOPROLOL TARTRATE 1 MG/ML
10 INJECTION, SOLUTION INTRAVENOUS EVERY 6 HOURS
Status: DISCONTINUED | OUTPATIENT
Start: 2017-11-12 | End: 2017-11-14

## 2017-11-12 RX ADMIN — METOPROLOL TARTRATE 10 MG: 5 INJECTION INTRAVENOUS at 09:20

## 2017-11-12 RX ADMIN — METOPROLOL TARTRATE 7.5 MG: 5 INJECTION INTRAVENOUS at 02:22

## 2017-11-12 RX ADMIN — ENOXAPARIN SODIUM 40 MG: 40 INJECTION SUBCUTANEOUS at 09:21

## 2017-11-12 RX ADMIN — METOPROLOL TARTRATE 10 MG: 5 INJECTION INTRAVENOUS at 14:24

## 2017-11-12 RX ADMIN — BACITRACIN: 500 OINTMENT TOPICAL at 09:22

## 2017-11-12 RX ADMIN — Medication 10 MEQ: at 15:27

## 2017-11-12 RX ADMIN — ACETAMINOPHEN 650 MG: 325 SOLUTION ORAL at 09:20

## 2017-11-12 RX ADMIN — SODIUM CHLORIDE, POTASSIUM CHLORIDE, SODIUM LACTATE AND CALCIUM CHLORIDE: 600; 310; 30; 20 INJECTION, SOLUTION INTRAVENOUS at 05:50

## 2017-11-12 RX ADMIN — PANTOPRAZOLE SODIUM 40 MG: 40 INJECTION, POWDER, FOR SOLUTION INTRAVENOUS at 06:33

## 2017-11-12 RX ADMIN — Medication 10 MEQ: at 12:51

## 2017-11-12 RX ADMIN — METOPROLOL TARTRATE 10 MG: 5 INJECTION INTRAVENOUS at 20:58

## 2017-11-12 NOTE — PLAN OF CARE
Problem: Chest Tube Drainage Device (Adult)  Goal: Signs and Symptoms of Listed Potential Problems Will be Absent, Minimized or Managed (Chest Tube Drainage Device)  Signs and symptoms of listed potential problems will be absent, minimized or managed by discharge/transition of care (reference Chest Tube Drainage Device (Adult) CPG).   Outcome: Improving  A/O. VSS. LS diminished. Incision CDI. Chest tube to suction. Using IS, and acapella. On-q infusing. J-tube flushed per orders. BS hypoactive, passing some flatus. Voiding adequately. PCA for pain. Ambulated in halls assist of 1 and walker, up in chair. NPO.

## 2017-11-12 NOTE — PLAN OF CARE
Problem: Patient Care Overview  Goal: Plan of Care/Patient Progress Review  Outcome: No Change  A/O, VSS except slightly elevated BP's. Pain controlled with dilaudid PCA. LS diminished, IS 1500, green flutter valve done. BS hypoactive, passing flatus. Abdominal and R back incision CDI/PONCHO. CT to suction, dressing CDI. No crepitus or air leak. OnQ infusing sensors taped. GJ tube flushed and clamped. Strict NPO. Denies N/V. Adequate UOP.

## 2017-11-12 NOTE — PLAN OF CARE
Problem: Patient Care Overview  Goal: Plan of Care/Patient Progress Review  Outcome: Improving  A/Ox4, VSS ex htn in 150's to 160's, MD aware, new orders received. UGI completed today, sips of clears ok. Incisions CDI/PONCHO covered with steri strips. BS hypo, denies flatus. CT on right to -20 suction. Minimal serous output. On-Q in place. Pulses+2 CMS intact. Jtube flushed/patent.

## 2017-11-12 NOTE — PROGRESS NOTES
[facility]    Hospitalist Progress Note    Date of Service (when I saw the patient): 11/12/2017    Assessment & Plan   Mr. Ramon Winter is a 71-year-old gentleman with past medical history significant for DM2, adenocarcinoma of the distal esophagus, gastroesophageal reflux disease, gout, hyperlipidemia, essential hypertension, chronic kidney disease stage III, benign prostatic hyperplasia, and anxiety who underwent elective esophagogastrectomy and placement of jejunostomy feeding tube on 11/07. Hospitalist Service is consulted to assist with medical management.      Adenocarcinoma of distal esophagus, status post chemotherapy.  Status post esophagogastrectomy and placement of jejunostomy feeding tube 11/07, POD #4.   -Cont post-op surgical management per thoracic surgery team  -Cont n.p.o., defer to primary when to resume po intake  -Dilaudid PCA per primary service      Acute blood loss anemia:  Due to above.  ml.  Hgb 13.2 on 09/22.  - Cont to monitor Hgb intermittently, down to 8.1 today  - likely from chest tubes draining blood tinged fluid, no clinical bleeding in stools or urine, monitor      Essential hypertension.   PTA: Tenoretic 100-25 mg po daily and lisinopril 10 mg po daily  BP is currently controlled.  -Cont to hold PTA Tenoretic and ACEI  -Not to use feeding tube per surgery  -increased IV metoprolol to 10mg q.6 hours with hold parameters due to rising BP's    -PRN hydralazine and labetalol ordered.   -Cont to monitor BP      Diabetes mellitus, type 2, without complication.   PTA:  Glipizide 5 mg po daily and metformin 1000 mg po daily.   Hgb A1c 6.9% (11/08)  BG is controlled.  -Cont to hold PTA oral hypoglycemic agents  -Cont Lantus 5 units sc daily, started 11/08  -Cont Novolog sliding scale  -Cont to monitor BG Q4hs while NPO     Chronic kidney disease, stage III.  Baseline cr recently in the range of 1.3-1.9.  Cr stable.  -Cont to monitor renal function  periodically  -Avoid NSAIDs and nephrotoxic agents  -K replacement protocol      Hyperlipidemia.   -Cont to hold prior to admission Crestor while NPO.       Gastroesophageal reflux disease, stable.   -Cont to hold prior to admission omeprazole while NPO.   -Protonix 40 mg IV daily.      Gout, stable.  -Cont to hold prior to admission allopurinol while NPO.     DVT Prophylaxis: Defer to primary service  Code Status: Full Code    Disposition: Expected discharge per primary service.    Pam Mckay MD    Interval History   No new complaints. Ambulating well with staff. Denies blood in urine nor stools. Pain controlled with PCA pump.    -Data reviewed today: I reviewed all new labs and imaging results over the last 24 hours. I personally reviewed no images or EKG's today.    Physical Exam   Temp: 97.1  F (36.2  C) Temp src: Oral BP: (!) 136/95 Pulse: 88 Heart Rate: 81 Resp: 16 SpO2: 94 % O2 Device: None (Room air)    Vitals:    11/07/17 1051   Weight: 88.9 kg (196 lb)     Vital Signs with Ranges  Temp:  [97.1  F (36.2  C)-99.3  F (37.4  C)] 97.1  F (36.2  C)  Pulse:  [87-88] 88  Heart Rate:  [77-94] 81  Resp:  [16] 16  BP: (136-165)/(81-95) 136/95  SpO2:  [94 %-96 %] 94 %  I/O last 3 completed shifts:  In: 2388 [I.V.:2208; NG/GT:180]  Out: 1320 [Urine:1000; Emesis/NG output:50; Chest Tube:270]    Constitutional: Awake, alert, cooperative, no apparent distress  Respiratory: Clear to auscultation bilaterally except for chest tube rub, no crackles or wheezing  Cardiovascular: Regular rate and rhythm, normal S1 and S2, and no murmur noted  GI: Normal bowel sounds, soft, non-distended, non-tender  Skin/Integumen: No rashes, no cyanosis, no edema  Other: Oriented x 3, follows all commands.    Medications     - MEDICATION INSTRUCTIONS -       lactated ringers 100 mL/hr at 11/12/17 0550     disposable pump w/ anesthetic ([facility]) 4 mL/hr at 11/07/17 2209     - MEDICATION INSTRUCTIONS -         metoprolol  10 mg Intravenous  Q6H     lidocaine 2 %  10 mL Urethral Once     insulin glargine  5 Units Subcutaneous QAM AC     bacitracin   Topical TID     sodium chloride (PF)  3 mL Intracatheter Q8H     enoxaparin  40 mg Subcutaneous Q24H     HYDROmorphone   Intravenous PCA     pantoprazole  40 mg Intravenous QAM AC     insulin aspart  1-6 Units Subcutaneous Q4H       Data     Recent Labs  Lab 11/12/17  0750 11/11/17  0719 11/10/17  0708 11/09/17  0618  11/07/17  1011   WBC  --  5.9  --  8.6  --  5.2   HGB 8.1* 8.7* 8.7* 9.3*  --  11.1*   MCV  --  100  --  102*  --  98   PLT  --  186 170 181  --  222   INR  --   --   --   --   --  0.90    136 138 140  --  137   POTASSIUM 3.1* 3.0* 3.4 4.1  < > 3.8   CHLORIDE 103 100 104 105  --  105   CO2 26 26 26 27  --  26   BUN 18 16 21 31*  --  28   CR 1.16 1.13 1.21 1.52*  --  1.53*   ANIONGAP 9 10 8 8  --  6   ELIANA 8.7 8.5 8.6 8.7  --  9.6   GLC 91 118* 129* 145*  --  137*   < > = values in this interval not displayed.    Recent Results (from the past 24 hour(s))   XR Chest Port 1 View    Narrative    CHEST ONE VIEW PORTABLE 11/12/2017 5:04 AM     HISTORY: Postoperative esophagectomy      COMPARISON: Chest x-ray 11/11/2017.      Impression    IMPRESSION: Portable view of the chest is unchanged. Lungs remain  clear. Right chest tube is stable in position. No significant pleural  effusions. Possible tiny apical pneumothorax is noted. No left  pneumothorax. Heart remains enlarged. Aorta is tortuous with scattered  calcification. Right paramediastinal opacity is likely related to  esophagectomy and gastric pull-through.    NAN DOTSON MD   XR Upper GI Water Soluble    Narrative    UPPER GASTROINTESTINAL WATER SOLUBLE  11/12/2017 11:00 AM     HISTORY: Postoperative esophagectomy.    COMPARISON: Chest CT 9/22/2017.    FLUOROSCOPY TIME: 1.2 minutes.   SPOT IMAGES OR CINE RUNS: 8    FINDINGS: Nonionic contrast was given to the patient by mouth.  Contrast passed easily from the esophagus through the  esophagogastric  anastomosis. There is no evidence of leak at this proximal anastomosis  site. The contrast slowly passed from the stomach into the duodenum  after a period of approximately 5 minutes. There is no evidence of  leak near the pylorus.      Impression    IMPRESSION:  1. No evidence of leak at the proximal anastomosis or near the  pylorus.  2. Slightly delayed passage of contrast from the stomach into the  duodenum.    SORAYA HANEY MD

## 2017-11-12 NOTE — PROGRESS NOTES
THORACIC SURGERY POD # 5    doing well  AVSS on RA  Serous CT output    CXR ok    UGI tomorrow  NPO  CT suction    COLT LORENZ MD [facility] ONCOLOGY THORACIC SURGERY  CELL:  [phone]  OFFICE: [phone]

## 2017-11-13 ENCOUNTER — APPOINTMENT (OUTPATIENT)
Dept: GENERAL RADIOLOGY | Facility: CLINIC | Age: 72
DRG: 327 | End: 2017-11-13
Attending: PHYSICIAN ASSISTANT
Payer: MEDICARE

## 2017-11-13 LAB
CREAT SERPL-MCNC: 1.17 MG/DL (ref 0.66–1.25)
GFR SERPL CREATININE-BSD FRML MDRD: 61 ML/MIN/1.7M2
GLUCOSE BLDC GLUCOMTR-MCNC: 116 MG/DL (ref 70–99)
GLUCOSE BLDC GLUCOMTR-MCNC: 151 MG/DL (ref 70–99)
GLUCOSE BLDC GLUCOMTR-MCNC: 79 MG/DL (ref 70–99)
GLUCOSE BLDC GLUCOMTR-MCNC: 94 MG/DL (ref 70–99)
GLUCOSE BLDC GLUCOMTR-MCNC: 99 MG/DL (ref 70–99)
HGB BLD-MCNC: 8.6 G/DL (ref 13.3–17.7)
PLATELET # BLD AUTO: 209 10E9/L (ref 150–450)
POTASSIUM SERPL-SCNC: 3.2 MMOL/L (ref 3.4–5.3)
POTASSIUM SERPL-SCNC: 3.5 MMOL/L (ref 3.4–5.3)

## 2017-11-13 PROCEDURE — 25000128 H RX IP 250 OP 636: Performed by: PHYSICIAN ASSISTANT

## 2017-11-13 PROCEDURE — 25000125 ZZHC RX 250: Performed by: INTERNAL MEDICINE

## 2017-11-13 PROCEDURE — 36415 COLL VENOUS BLD VENIPUNCTURE: CPT | Performed by: PHYSICIAN ASSISTANT

## 2017-11-13 PROCEDURE — 84132 ASSAY OF SERUM POTASSIUM: CPT | Performed by: PHYSICIAN ASSISTANT

## 2017-11-13 PROCEDURE — 82565 ASSAY OF CREATININE: CPT | Performed by: PHYSICIAN ASSISTANT

## 2017-11-13 PROCEDURE — 99232 SBSQ HOSP IP/OBS MODERATE 35: CPT | Performed by: INTERNAL MEDICINE

## 2017-11-13 PROCEDURE — A9270 NON-COVERED ITEM OR SERVICE: HCPCS | Mod: GY | Performed by: PHYSICIAN ASSISTANT

## 2017-11-13 PROCEDURE — 36415 COLL VENOUS BLD VENIPUNCTURE: CPT | Performed by: THORACIC SURGERY (CARDIOTHORACIC VASCULAR SURGERY)

## 2017-11-13 PROCEDURE — 85018 HEMOGLOBIN: CPT | Performed by: PHYSICIAN ASSISTANT

## 2017-11-13 PROCEDURE — 25000131 ZZH RX MED GY IP 250 OP 636 PS 637: Mod: GY | Performed by: INTERNAL MEDICINE

## 2017-11-13 PROCEDURE — 85049 AUTOMATED PLATELET COUNT: CPT | Performed by: PHYSICIAN ASSISTANT

## 2017-11-13 PROCEDURE — 00000146 ZZHCL STATISTIC GLUCOSE BY METER IP

## 2017-11-13 PROCEDURE — 25000132 ZZH RX MED GY IP 250 OP 250 PS 637: Mod: GY | Performed by: PHYSICIAN ASSISTANT

## 2017-11-13 PROCEDURE — 84132 ASSAY OF SERUM POTASSIUM: CPT | Performed by: THORACIC SURGERY (CARDIOTHORACIC VASCULAR SURGERY)

## 2017-11-13 PROCEDURE — 25000128 H RX IP 250 OP 636: Performed by: THORACIC SURGERY (CARDIOTHORACIC VASCULAR SURGERY)

## 2017-11-13 PROCEDURE — 25000125 ZZHC RX 250: Performed by: PHYSICIAN ASSISTANT

## 2017-11-13 PROCEDURE — 12000007 ZZH R&B INTERMEDIATE

## 2017-11-13 PROCEDURE — 25000128 H RX IP 250 OP 636: Performed by: INTERNAL MEDICINE

## 2017-11-13 PROCEDURE — 71010 XR CHEST PORT 1 VW: CPT

## 2017-11-13 RX ORDER — HYDROMORPHONE HYDROCHLORIDE 2 MG/1
2-4 TABLET ORAL
Status: DISCONTINUED | OUTPATIENT
Start: 2017-11-13 | End: 2017-11-14 | Stop reason: HOSPADM

## 2017-11-13 RX ADMIN — Medication 10 MEQ: at 10:24

## 2017-11-13 RX ADMIN — Medication 10 MEQ: at 12:44

## 2017-11-13 RX ADMIN — METOPROLOL TARTRATE 10 MG: 5 INJECTION INTRAVENOUS at 09:24

## 2017-11-13 RX ADMIN — Medication 10 MEQ: at 11:30

## 2017-11-13 RX ADMIN — METOPROLOL TARTRATE 10 MG: 5 INJECTION INTRAVENOUS at 02:57

## 2017-11-13 RX ADMIN — INSULIN GLARGINE 5 UNITS: 100 INJECTION, SOLUTION SUBCUTANEOUS at 09:22

## 2017-11-13 RX ADMIN — ENOXAPARIN SODIUM 40 MG: 40 INJECTION SUBCUTANEOUS at 09:23

## 2017-11-13 RX ADMIN — Medication 10 MEQ: at 14:15

## 2017-11-13 RX ADMIN — METOPROLOL TARTRATE 10 MG: 5 INJECTION INTRAVENOUS at 16:16

## 2017-11-13 RX ADMIN — INSULIN ASPART 1 UNITS: 100 INJECTION, SOLUTION INTRAVENOUS; SUBCUTANEOUS at 12:40

## 2017-11-13 RX ADMIN — HYDROMORPHONE HYDROCHLORIDE 4 MG: 2 TABLET ORAL at 18:33

## 2017-11-13 RX ADMIN — PANTOPRAZOLE SODIUM 40 MG: 40 INJECTION, POWDER, FOR SOLUTION INTRAVENOUS at 07:00

## 2017-11-13 RX ADMIN — SODIUM CHLORIDE, POTASSIUM CHLORIDE, SODIUM LACTATE AND CALCIUM CHLORIDE: 600; 310; 30; 20 INJECTION, SOLUTION INTRAVENOUS at 07:01

## 2017-11-13 RX ADMIN — HYDROMORPHONE HYDROCHLORIDE 4 MG: 2 TABLET ORAL at 22:25

## 2017-11-13 RX ADMIN — HYDROMORPHONE HYDROCHLORIDE 2 MG: 2 TABLET ORAL at 15:26

## 2017-11-13 RX ADMIN — METOPROLOL TARTRATE 10 MG: 5 INJECTION INTRAVENOUS at 22:25

## 2017-11-13 NOTE — PROGRESS NOTES
[facility]    Hospitalist Progress Note    Assessment & Plan   Mr. Ramon Winter is a 71-year-old gentleman with past medical history significant for DM2, adenocarcinoma of the distal esophagus, gastroesophageal reflux disease, gout, hyperlipidemia, essential hypertension, chronic kidney disease stage III, benign prostatic hyperplasia, and anxiety who underwent elective esophagogastrectomy and placement of jejunostomy feeding tube on 11/07. Hospitalist Service is consulted to assist with medical management.      Adenocarcinoma of distal esophagus.  Status post esophagogastrectomy and placement of jejunostomy feeding tube 11/07.   -Cont post-op surgical management per thoracic surgery team  -Diet per primary service      Acute blood loss anemia:  Due to above.  ml.  Hgb 13.2 on 09/22.  -stable at 8.6      Essential hypertension.   PTA: Tenoretic 100-25 mg po daily and lisinopril 10 mg po daily  BP is currently controlled.  -resume atenolol and lisinopril in AM  -continue IV metoprolol to 10mg q.6 hours with hold parameters   -PRN hydralazine and labetalol available.   -Cont to monitor BP      Diabetes mellitus, type 2, without complication.   PTA:  Glipizide 5 mg po daily and metformin 1000 mg po daily.   Hgb A1c 6.9% (11/08)  BG is controlled.  -Cont to hold PTA oral hypoglycemic agents  -Cont Lantus 5 units sc daily, started 11/08  -Cont Novolog sliding scale      Chronic kidney disease, stage III.  Baseline cr recently in the range of 1.3-1.9.  Cr stable.  -Cont to monitor renal function periodically  -Avoid NSAIDs and nephrotoxic agents  -K replacement protocol      Hyperlipidemia.   -Cont to hold prior to admission Crestor    D/W: RN  DVT Prophylaxis: Enoxaparin (Lovenox) SQ  Code Status: Full Code    Disposition: Expected discharge per primary team.    Maik Hoyt MD    Interval History   Pain at incision site. BP slightly on the higher side. No dyspnea    -Data reviewed today: I  reviewed all new labs and imaging results over the last 24 hours. I personally reviewed no images or EKG's today.    Physical Exam   Temp: 97.8  F (36.6  C) Temp src: Oral BP: (!) 159/103 Pulse: 93 Heart Rate: 94 Resp: 16 SpO2: 96 % O2 Device: None (Room air)    Vitals:    11/07/17 1051   Weight: 88.9 kg (196 lb)     Vital Signs with Ranges  Temp:  [97.6  F (36.4  C)-98.6  F (37  C)] 97.8  F (36.6  C)  Pulse:  [85-93] 93  Heart Rate:  [84-94] 94  Resp:  [16] 16  BP: (139-162)/() 159/103  SpO2:  [95 %-98 %] 96 %  I/O last 3 completed shifts:  In: 3362 [P.O.:300; I.V.:2882; NG/GT:180]  Out: 910 [Urine:800; Chest Tube:110]    Constitutional: AAOX3, NAD, Appears comfortable  HEENT: Moist oral mucosa, no oral lesions, No pallor or icterus  Neck- Supple, Good ROM, No JVD  Respiratory:  CTA B/L, Normal WOB  Cardiovascular: RRR, No murmur  GI: Soft, G-tube +  Skin/Integument: Warm and dry, no rashes  MSK: No joint deformity or swelling, no edema  Neuro: CN- grossly intact    Medications     - MEDICATION INSTRUCTIONS -       lactated ringers 50 mL/hr at 11/13/17 1245     - MEDICATION INSTRUCTIONS -         [START ON 11/14/2017] pantoprazole  40 mg Oral Daily     metoprolol  10 mg Intravenous Q6H     lidocaine 2 %  10 mL Urethral Once     insulin glargine  5 Units Subcutaneous QAM AC     sodium chloride (PF)  3 mL Intracatheter Q8H     enoxaparin  40 mg Subcutaneous Q24H     insulin aspart  1-6 Units Subcutaneous Q4H       Data     Recent Labs  Lab 11/13/17  0745 11/12/17  1824 11/12/17  0750 11/11/17  0719 11/10/17  0708 11/09/17  0618  11/07/17  1011   WBC  --   --   --  5.9  --  8.6  --  5.2   HGB 8.6*  --  8.1* 8.7* 8.7* 9.3*  --  11.1*   MCV  --   --   --  100  --  102*  --  98     --   --  186 170 181  --  222   INR  --   --   --   --   --   --   --  0.90   NA  --   --  138 136 138 140  --  137   POTASSIUM 3.2* 3.4 3.1* 3.0* 3.4 4.1  < > 3.8   CHLORIDE  --   --  103 100 104 105  --  105   CO2  --   --  26  26 26 27  --  26   BUN  --   --  18 16 21 31*  --  28   CR 1.17  --  1.16 1.13 1.21 1.52*  --  1.53*   ANIONGAP  --   --  9 10 8 8  --  6   ELIANA  --   --  8.7 8.5 8.6 8.7  --  9.6   GLC  --   --  91 118* 129* 145*  --  137*   < > = values in this interval not displayed.    Recent Results (from the past 24 hour(s))   XR Chest Port 1 View    Narrative    CHEST PORTABLE ONE VIEW November 13, 2017 5:16 AM     HISTORY: Postop esophagectomy.     COMPARISON: 11/12/2017.      Impression    IMPRESSION: Right apical chest tube remains in place. Unchanged tiny  right apical pneumothorax. No new focal airspace opacities. Streaky  left basilar atelectasis is unchanged. Air beneath the right  hemidiaphragm is again noted. Cardiac silhouette is unchanged.    SORAYA HANEY MD

## 2017-11-13 NOTE — PLAN OF CARE
Problem: Chest Tube Drainage Device (Adult)  Goal: Signs and Symptoms of Listed Potential Problems Will be Absent, Minimized or Managed (Chest Tube Drainage Device)  Signs and symptoms of listed potential problems will be absent, minimized or managed by discharge/transition of care (reference Chest Tube Drainage Device (Adult) CPG).   Outcome: No Change  A/O. VSS except for HTN. LS diminished. Encouraging use of IS, and acapella. Incisions CDI. On-q infusing. BS hypo, +flatus. J tube flushed per orders. Ambulated in halls assist 1 and walker. Okay to have sips of clears, tolerating well.

## 2017-11-13 NOTE — PLAN OF CARE
Problem: Patient Care Overview  Goal: Plan of Care/Patient Progress Review  Outcome: Improving  VSS, LS w/ course crackles in RLL, otherwise dim, CT intact, no AL or crepitus, voiding adequately, pain well controlled w/ PCA, tolerating CL, up SBA

## 2017-11-13 NOTE — PLAN OF CARE
Problem: Patient Care Overview  Goal: Individualization & Mutuality  Chest tube and On Q pump dcd today. Occlusive dressing remains dry and intact. Pca dcd earlier. Oral dilaudid 2mg given at end of shift. States had bm today. K+ 3.2 and replaced with IV doses. Tolerating clear liquids in small amts. Up with walker and SBA.Nevin Torres RN

## 2017-11-13 NOTE — PROGRESS NOTES
"Thoracic Surgery POD #6:  BP (!) 162/100 (BP Location: Left arm)  Pulse 85  Temp 97.6  F (36.4  C) (Oral)  Resp 16  Ht 1.753 m (5' 9\")  Wt 88.9 kg (196 lb)  SpO2 95%  BMI 28.94 kg/m2   UGI 11/12: no leak, no obstruction, good contrast flow  CT:  Minimal output- serous- no air leak  S:  Doing well but \"butt is sore\" due to sitting so much. Urinating often- no hesitancy- +BMs, tolerating sips and ice chips well without dysphagia nor regurg. Pain controlled. No dyspnea. Discussed advancing to Lear Liquid diet today and possibility of home tomorrow on full liquids on soft mechanical.  O:  On-Q: empty- DCed without complication  Inc.:  Benign  CT:  No air leak, DCed without complication.  Occlusive dressing applied.  P:  Walk- po pain meds- Nutrition to see for dietary instruction- DC PCA- decrease IVF- anticipate home in 1-2 days    Elaine Su PA-C with Dr. [facility]  Cell [phone]        "

## 2017-11-14 ENCOUNTER — APPOINTMENT (OUTPATIENT)
Dept: GENERAL RADIOLOGY | Facility: CLINIC | Age: 72
DRG: 327 | End: 2017-11-14
Attending: PHYSICIAN ASSISTANT
Payer: MEDICARE

## 2017-11-14 VITALS
SYSTOLIC BLOOD PRESSURE: 116 MMHG | DIASTOLIC BLOOD PRESSURE: 84 MMHG | BODY MASS INDEX: 29.03 KG/M2 | RESPIRATION RATE: 16 BRPM | TEMPERATURE: 97.6 F | HEIGHT: 69 IN | WEIGHT: 196 LBS | OXYGEN SATURATION: 94 % | HEART RATE: 81 BPM

## 2017-11-14 LAB
GLUCOSE BLDC GLUCOMTR-MCNC: 124 MG/DL (ref 70–99)
GLUCOSE BLDC GLUCOMTR-MCNC: 90 MG/DL (ref 70–99)
GLUCOSE BLDC GLUCOMTR-MCNC: 98 MG/DL (ref 70–99)
GLUCOSE BLDC GLUCOMTR-MCNC: 99 MG/DL (ref 70–99)
POTASSIUM SERPL-SCNC: 3.3 MMOL/L (ref 3.4–5.3)
POTASSIUM SERPL-SCNC: 3.8 MMOL/L (ref 3.4–5.3)

## 2017-11-14 PROCEDURE — 00000146 ZZHCL STATISTIC GLUCOSE BY METER IP

## 2017-11-14 PROCEDURE — 84132 ASSAY OF SERUM POTASSIUM: CPT | Performed by: THORACIC SURGERY (CARDIOTHORACIC VASCULAR SURGERY)

## 2017-11-14 PROCEDURE — 99232 SBSQ HOSP IP/OBS MODERATE 35: CPT | Performed by: INTERNAL MEDICINE

## 2017-11-14 PROCEDURE — 71020 XR CHEST 2 VW: CPT

## 2017-11-14 PROCEDURE — A9270 NON-COVERED ITEM OR SERVICE: HCPCS | Mod: GY | Performed by: PHYSICIAN ASSISTANT

## 2017-11-14 PROCEDURE — 84132 ASSAY OF SERUM POTASSIUM: CPT | Performed by: INTERNAL MEDICINE

## 2017-11-14 PROCEDURE — 25000128 H RX IP 250 OP 636: Performed by: PHYSICIAN ASSISTANT

## 2017-11-14 PROCEDURE — 25000128 H RX IP 250 OP 636: Performed by: INTERNAL MEDICINE

## 2017-11-14 PROCEDURE — 25000131 ZZH RX MED GY IP 250 OP 636 PS 637: Mod: GY | Performed by: INTERNAL MEDICINE

## 2017-11-14 PROCEDURE — 36415 COLL VENOUS BLD VENIPUNCTURE: CPT | Performed by: INTERNAL MEDICINE

## 2017-11-14 PROCEDURE — A9270 NON-COVERED ITEM OR SERVICE: HCPCS | Mod: GY | Performed by: INTERNAL MEDICINE

## 2017-11-14 PROCEDURE — 25000132 ZZH RX MED GY IP 250 OP 250 PS 637: Mod: GY | Performed by: PHYSICIAN ASSISTANT

## 2017-11-14 PROCEDURE — 36415 COLL VENOUS BLD VENIPUNCTURE: CPT | Performed by: THORACIC SURGERY (CARDIOTHORACIC VASCULAR SURGERY)

## 2017-11-14 PROCEDURE — 25000125 ZZHC RX 250: Performed by: INTERNAL MEDICINE

## 2017-11-14 PROCEDURE — 25000132 ZZH RX MED GY IP 250 OP 250 PS 637: Mod: GY | Performed by: INTERNAL MEDICINE

## 2017-11-14 RX ORDER — LISINOPRIL 10 MG/1
10 TABLET ORAL DAILY
Status: DISCONTINUED | OUTPATIENT
Start: 2017-11-14 | End: 2017-11-14 | Stop reason: HOSPADM

## 2017-11-14 RX ORDER — HYDROMORPHONE HYDROCHLORIDE 2 MG/1
2-4 TABLET ORAL EVERY 4 HOURS PRN
Qty: 50 TABLET | Refills: 0 | Status: SHIPPED | OUTPATIENT
Start: 2017-11-14 | End: 2018-06-24

## 2017-11-14 RX ORDER — PANTOPRAZOLE SODIUM 40 MG/1
40 TABLET, DELAYED RELEASE ORAL DAILY
Qty: 30 TABLET | Refills: 1 | Status: SHIPPED | OUTPATIENT
Start: 2017-11-14 | End: 2018-06-24

## 2017-11-14 RX ORDER — ATENOLOL 50 MG/1
50 TABLET ORAL DAILY
Status: DISCONTINUED | OUTPATIENT
Start: 2017-11-14 | End: 2017-11-14 | Stop reason: HOSPADM

## 2017-11-14 RX ORDER — POTASSIUM CHLORIDE 1.5 G/1.58G
20 POWDER, FOR SOLUTION ORAL DAILY
Qty: 30 TABLET | Refills: 0 | Status: SHIPPED | OUTPATIENT
Start: 2017-11-14 | End: 2018-06-24

## 2017-11-14 RX ADMIN — ATENOLOL 50 MG: 50 TABLET ORAL at 09:51

## 2017-11-14 RX ADMIN — Medication 10 MEQ: at 11:00

## 2017-11-14 RX ADMIN — INSULIN GLARGINE 5 UNITS: 100 INJECTION, SOLUTION SUBCUTANEOUS at 08:05

## 2017-11-14 RX ADMIN — HYDROMORPHONE HYDROCHLORIDE 4 MG: 2 TABLET ORAL at 08:13

## 2017-11-14 RX ADMIN — HYDROMORPHONE HYDROCHLORIDE 2 MG: 2 TABLET ORAL at 04:13

## 2017-11-14 RX ADMIN — METOPROLOL TARTRATE 10 MG: 5 INJECTION INTRAVENOUS at 04:14

## 2017-11-14 RX ADMIN — Medication 10 MEQ: at 09:49

## 2017-11-14 RX ADMIN — ENOXAPARIN SODIUM 40 MG: 40 INJECTION SUBCUTANEOUS at 08:38

## 2017-11-14 RX ADMIN — HYDROMORPHONE HYDROCHLORIDE 4 MG: 2 TABLET ORAL at 12:34

## 2017-11-14 RX ADMIN — Medication 10 MEQ: at 08:45

## 2017-11-14 RX ADMIN — LISINOPRIL 10 MG: 10 TABLET ORAL at 09:50

## 2017-11-14 RX ADMIN — SODIUM CHLORIDE, POTASSIUM CHLORIDE, SODIUM LACTATE AND CALCIUM CHLORIDE: 600; 310; 30; 20 INJECTION, SOLUTION INTRAVENOUS at 04:09

## 2017-11-14 RX ADMIN — Medication 40 MG: at 08:40

## 2017-11-14 NOTE — PLAN OF CARE
Problem: Patient Care Overview  Goal: Plan of Care/Patient Progress Review  Outcome: No Change  VSS ex hypertension, scheduled metoprolol given. Pain managed with PRN dilaudid. CT occlusive site CDI. Thoracic incision intact with steri strips. Abdominal incision intact with steri strips. J tube site CDI, clamped, irrigated with 60 cc. Tolerating clears. +BS, +flatus. LS diminished. Voiding. Up assist of 1 and walker. IS and flutter valve encouraged.

## 2017-11-14 NOTE — PLAN OF CARE
Problem: Patient Care Overview  Goal: Individualization & Mutuality  Discharge instructions given to pt and wife. Questions answered. Offered to reexplain dressing changes and j tube flush but declined. Filled rx's given.Nevin Torres RN

## 2017-11-14 NOTE — PLAN OF CARE
Problem: Patient Care Overview  Goal: Individualization & Mutuality  7415 Discharged to wife. W/C escort to car. Nevin Torres RN

## 2017-11-14 NOTE — CONSULTS
NUTRITION EDUCATION    REASON FOR ASSESSMENT:  PA consult - diet education s/p esophagectomy    NUTRITION HISTORY:  Pt has had nutritional supplements in the past and has grown to hate the taste ot them.  However, there was one that he tolerated, he's not sure of the name but his wife will know.    CURRENT DIET:  Clear liquids, starting full liquids    NUTRITION DIAGNOSIS:  Food- and nutrition-related knowledge deficit R/t no prior exposure to the information AEB by esophagectomy      INTERVENTIONS:    Nutrition Prescription:  ADAT per MD    Implementation:    Nutrition Education (Content):  a) Provided handout: Full liquids and Dr. Jeter' esophagectomy handout  b) Discussed full liquids x 1 week (until 11/22), nutritional supplements (suggested 3x/day)     Nutrition Education (Application):  a) Discussed current eating habits and recommended alternative food choices    Anticipate good compliance    Diet Education - refer to Education Flowsheet    Goals:    Patient verbalizes understanding of diet     All of the above goals met during the education session    Follow Up/Monitoring:    Provided RD contact information for future questions.    Recommend Out-Patient Nutrition Referral, if further diet instructions are needed.    Aparna Arrington RD  Pager [phone] (M-F)            [phone] (W/E & Hol)

## 2017-11-14 NOTE — PLAN OF CARE
Problem: Surgery Nonspecified (Adult)  Goal: Signs and Symptoms of Listed Potential Problems Will be Absent, Minimized or Managed (Surgery Nonspecified)  Signs and symptoms of listed potential problems will be absent, minimized or managed by discharge/transition of care (reference Surgery Nonspecified (Adult) CPG).   Outcome: Improving  A/O. VSS except for HTN. Scheduled metoprolol given. LS clear. IS to 1500, using flutter valve. Dressings CDI. +BS, +flatus. Voiding adequately. J-tubed flushed per orders. Rating pain 6-7/10. Prn dilaudid x2. Mepilex to coccyx, pt c/o of buttocks being sore, some redness placed mepilex for prevention. Tolerating clear liquid diet. Up Assist of 1 and walker.

## 2017-11-14 NOTE — DISCHARGE INSTRUCTIONS
"[facility]  Discharge Orders & Follow-up Care  Video-Assisted: Thoracoscopy - Thoracotomy    Call Suzy at Dr. Jeter  office at [phone] to make a return appointment with a chest x-ray on_Wednesday, November 22_.  Please also remind Suzy that you need your blood drawn for some labs before your visit. Your appointment will be with either Maddi Torres PA-C, or Dr. Jeter, or both.     Our office is located at:  [address], [address], [place] [address].  Suzy will explain where to park when you call for an appointment.    A. Patient Care  Call Dr Jeter  office @ [phone] if:  ? Severe chills or a fever or 100.4 F.  temperature on two occasions  ? Increased incisional pain and/or redness  ? Presence of unusual incisional or chest tube site drainage  ? Coughing up bright red blood or greenish-yellow secretions  ? Significant increase in shortness of breath    Pain Relief  You have been given a prescription for narcotic pain medicine.  You may also take acetaminophen over the counter.  Recommended dosages are: 650 mg Acetaminophen every 4 hours as needed.  Many patients get good pain relief by \"staggering\" these medications.     No driving while on narcotics.     Activity  _XXX__ No heavy lifting greater than 10 pounds on the operative side for 4-6 weeks for a thoracotomy    Wound Care  You may shower tomorrow.  Wash the incision and chest tube site(s) daily with soap and water. No bathing or immersing incision underwater for approximately 2 weeks or until the chest tube sites are completely healed. Please remember to cover your feeding tube with plastic wrap or a plastic bag and tape to prevent it from getting wet.    Place a dry gauze dressing over the chest tube site(s) because it(they) will drain a few days.  Do not be alarmed if there is drainage of a large amount of fluid (should be pink or yellow-- or somewhat bloody) either spontaneously or " with coughing or exertion. If this happens, just place a large dry gauze dressing over the chest tube site-- it will stop and scab over in about a week or so. Once the drainage stops, then leave the chest tube site(s) open to air.     White steri strips will be present on the incision(s). They will peel off within about 10 days-- otherwise, they will be removed at your post-op appointment.     B. Respiratory  _XXX__ Utilize Incentive spirometer 10 times in a row every few hours while awake for a few weeks after discharging home from the hospital    C. Diet  Please remain on a full liquid diet, as instructed by the dietitian, until you see Dr. Jeter on 11/22.    D. Flush  Please remember to flush your feeding tube once a day with 60 cc of tap water to prevent your tube from clogging

## 2017-11-14 NOTE — PROGRESS NOTES
"Thoracic Surgery POD #7:  BP (!) 151/93 (BP Location: Left arm)  Pulse 85  Temp 98.5  F (36.9  C) (Oral)  Resp 16  Ht 1.753 m (5' 9\")  Wt 88.9 kg (196 lb)  SpO2 95%  BMI 28.94 kg/m2  CXR:  OK, no PTX, stable  UOP: good  K+: 3.3-- currently being replaced  S: Doing well with clear liquid diet. Denies dysphagia. Will meet with Dietitian today regarding full liquids until 11/22 and then soft mechanical diet thereafter if no issues. Pain controlled. Still with diarrhea (baseline for him). Not SOB. Fully instructed on diet recommendations, wound care, flushing Jtube, and follow up.  O:  Inc.:  Benign, dry  CT site: occlusive dressing intact  P:  Dietitian to see now-- d/w her our reccs for full liquids until 11/22 and then soft mechanical, plus focusing on K+ rich liquids (OJ)  RN to review Jtube flushing-- give 60 cc syringe to patient upon DC  Called Dr. Hoyt to discuss low K+-- he will write for potassium packets (powdered) for DC  Outpatient fup with Dr. Jeter with CXR 11/22  Outpatient fup with [facility] in a few weeks    Elaine Su PA-C with Dr. Maximilian Jeter  [facility]  Cell [phone]      "

## 2017-11-14 NOTE — PROGRESS NOTES
[facility]    Hospitalist Progress Note    Assessment & Plan   Mr. Ramon Winter is a 71-year-old gentleman with past medical history significant for DM2, adenocarcinoma of the distal esophagus, gastroesophageal reflux disease, gout, hyperlipidemia, essential hypertension, chronic kidney disease stage III, benign prostatic hyperplasia, and anxiety who underwent elective esophagogastrectomy and placement of jejunostomy feeding tube on 11/07. Hospitalist Service is consulted to assist with medical management.      Adenocarcinoma of distal esophagus.  Status post esophagogastrectomy and placement of jejunostomy feeding tube 11/07.   -Cont post-op surgical management per thoracic surgery team  -Diet per primary service      Acute blood loss anemia:  Due to above.  ml.  Hgb 13.2 on 09/22.  -stable at 8.6(11/13)      Essential hypertension.   PTA: Tenoretic 100-25 mg po daily and lisinopril 10 mg po daily  BP is currently controlled.  -Resume prior to admission regimen  -Add potassium chloride 20 mEq daily      Diabetes mellitus, type 2, without complication.   PTA:  Glipizide 5 mg po daily and metformin 1000 mg po daily.   Hgb A1c 6.9% (11/08)  BG is controlled.  -Continue prior to admission regimen      Chronic kidney disease, stage III.  Baseline cr recently in the range of 1.3-1.9.  Cr stable.  -BMP in one week with PCP      Hyperlipidemia.   -Resume Crestor    D/W: RN  DVT Prophylaxis: Enoxaparin (Lovenox) SQ  Code Status: Full Code    Disposition: Expected discharge per primary team.    Maik Hoyt MD    Interval History   Pain better controlled, denies chest pain or dyspnea.  Blood pressure better controlled.    -Data reviewed today: I reviewed all new labs and imaging results over the last 24 hours. I personally reviewed no images or EKG's today.    Physical Exam   Temp: 97.6  F (36.4  C) Temp src: Oral BP: 116/84 Pulse: 81 Heart Rate: 74 Resp: 16 SpO2: 94 % O2 Device: None (Room  air)    Vitals:    11/07/17 1051   Weight: 88.9 kg (196 lb)     Vital Signs with Ranges  Temp:  [97.6  F (36.4  C)-98.9  F (37.2  C)] 97.6  F (36.4  C)  Pulse:  [81-93] 81  Heart Rate:  [74-94] 74  Resp:  [16] 16  BP: (116-170)/() 116/84  SpO2:  [94 %-96 %] 94 %  I/O last 3 completed shifts:  In: 2548 [P.O.:720; I.V.:1648; NG/GT:180]  Out: 575 [Urine:575]    Constitutional: AAOX3, NAD, Appears comfortable  Neck- Supple, Good ROM, No JVD  Respiratory:  CTA B/L, Normal WOB  Cardiovascular: RRR, No murmur  GI: Soft, G-tube +  Skin/Integument: Warm and dry, no rashes  MSK: No joint deformity or swelling, no edema  Neuro: CN- grossly intact    Medications     - MEDICATION INSTRUCTIONS -       - MEDICATION INSTRUCTIONS -         lisinopril (PRINIVIL/ZESTRIL) tablet 10 mg  10 mg Oral Daily     atenolol  50 mg Oral Daily     pantoprazole  40 mg Oral Daily     lidocaine 2 %  10 mL Urethral Once     insulin glargine  5 Units Subcutaneous QAM AC     sodium chloride (PF)  3 mL Intracatheter Q8H     enoxaparin  40 mg Subcutaneous Q24H     insulin aspart  1-6 Units Subcutaneous Q4H       Data     Recent Labs  Lab 11/14/17  0810 11/13/17  1714 11/13/17  0745  11/12/17  0750 11/11/17  0719 11/10/17  0708 11/09/17  0618   WBC  --   --   --   --   --  5.9  --  8.6   HGB  --   --  8.6*  --  8.1* 8.7* 8.7* 9.3*   MCV  --   --   --   --   --  100  --  102*   PLT  --   --  209  --   --  186 170 181   NA  --   --   --   --  138 136 138 140   POTASSIUM 3.3* 3.5 3.2*  < > 3.1* 3.0* 3.4 4.1   CHLORIDE  --   --   --   --  103 100 104 105   CO2  --   --   --   --  26 26 26 27   BUN  --   --   --   --  18 16 21 31*   CR  --   --  1.17  --  1.16 1.13 1.21 1.52*   ANIONGAP  --   --   --   --  9 10 8 8   ELIANA  --   --   --   --  8.7 8.5 8.6 8.7   GLC  --   --   --   --  91 118* 129* 145*   < > = values in this interval not displayed.    Recent Results (from the past 24 hour(s))   XR Chest 2 Views    Narrative    CHEST TWO VIEWS November 14,  2017 6:22 AM     HISTORY: Postoperative esophagectomy.       Impression    IMPRESSION: Since November 13, 2017, right-sided chest tube has been  removed. Intraperitoneal air is noted overlying both hemidiaphragms.  Small apical right pneumothorax not significantly changed from  previous exam. Nodules overlie both lower lobes, perhaps nipple  shadows. Trace, if any, pleural effusions.    BLAINE KENNY MD

## 2017-11-16 NOTE — DISCHARGE SUMMARY
THORACIC SURGERY HOSPITAL DISCHARGE SUMMARY  [facility] ONCOLOGY - THORACIC SURGERY  [address], [address]  [place] [address]  Phone [phone]  [email or web address]    11/16/2017     Joshua Gonsalez   [facility] [address] / [facility]  Phone: [phone]   Fax: [phone]        Re: Ramon Winter             1945             [id number]              Dates of Hospitalization: 11/7/2017 - 11/14/2017   Date of Service (when I saw the patient): 11/14/17    Dear Dr. Gonsalez:     As you are aware, we had the pleasure of caring for your patient,  Ramon Winter here at [facility].  He is a 71-year-old gentleman who was diagnosed with adenocarcinoma of the distal esophagus.  He underwent induction chemotherapy and radiation therapy.  The most recent PET scan shows a residual tumor which is less hypermetabolic than it was initially.  There is no evidence of distant disease.  Based on the findings, a resection is indicated for treatment.     On 11/7/2017, Dr. Maximilian Jeter performed the following:    Procedure/Surgery Information   Procedure: Procedure(s):  MARILEE BROOKS ESOPHAGOGASTRECTOMY, PLACEMENT OF FEEDING TUBE JEJUNOSOTMY, PYLOROMYOTOMY, LIGATION THORACIC DUCT - Wound Class: II-Clean Contaminated   Surgeon(s): Surgeon(s) and Role:     * Maximilian Jeter MD - Primary     * Elaine Su PA-C - Assisting   Specimens:   ID Type Source Tests Collected by Time Destination   A : DISTAL 3/4 ESOPHAGUS, PROXIMAL 1/3 STOMACH Tissue Other SURGICAL PATHOLOGY EXAM Maximilian Jeter MD 11/7/2017  2:38 PM         Final Surgical Pathology Revealed:  Moderately differentiated invasive adenocarcinoma of the distal esophagus, focally extending through the muscularis propria into the periesophageal soft tissue, status post neoadjuvant chemotherapy, surgical margins negative, all periesophageal lymph nodes  negative for metastatic carcinoma. Intestinal metaplasia (Pierre's esophagus) present. Final pathologic stage [id number].     His post-operative course was unremarkable.      Consultations This Hospital Stay   HOSPITALIST IP CONSULT  RESPIRATORY CARE IP CONSULT  NUTRITION SERVICES ADULT IP CONSULT    Ramon Winter has otherwise recovered sufficiently to be discharged to home today, 11/14/2017, on post-operative day number seven for further convalescence.  His incisions are healing well with no signs or symptoms of infection.  He is tolerating a full liquid diet without dysphagia and ambulating and transferring independently.  He is currently afebrile with stable vital signs.     Below, you will find a full discharge medication list and instructions.  We have arranged for Ramon Winter to follow-up with us in our [facility] on Wednesday, Nov. 22 with a Chest X-ray prior to that appointment.  We thank you for allowing us to participate in the care of Ramon Winter here at [facility].  Please feel free to contact our office at [phone] with any questions or concerns or if we can be of any further assistance in the care of this patient.    Sincerely,    Dr. Maximilian Jeter MD    D/C Summary Prepared by: Elaine Su PA-C    Discharge Medications:  Discharge Medication List as of 11/14/2017  2:41 PM      START taking these medications    Details   potassium chloride (KLOR-CON) 20 MEQ Packet Take 20 mEq by mouth daily, Disp-30 tablet, R-0, E-Prescribe      HYDROmorphone (DILAUDID) 2 MG tablet Take 1-2 tablets (2-4 mg) by mouth every 4 hours as needed for moderate to severe pain, Disp-50 tablet, R-0, Local Print      pantoprazole (PROTONIX) 40 MG EC tablet Take 1 tablet (40 mg) by mouth daily Take 30-60 minutes before a meal., Disp-30 tablet, R-1, E-Prescribe      Acetaminophen 325 MG CAPS Take 650 mg by mouth 4 times daily as needed, Disp-100 capsule, No Print Out         CONTINUE these  medications which have NOT CHANGED    Details   GLIPIZIDE ER PO Take 5 mg by mouth daily, Historical      ZOLPIDEM TARTRATE PO Take 5-10 mg by mouth nightly as needed for sleep, Historical      atenolol-chlorthalidone (TENORETIC) 100-25 MG per tablet Take 1 tablet by mouth daily, Historical      METFORMIN HCL PO Take 1,000 mg by mouth daily , Historical      LISINOPRIL PO Take 10 mg by mouth daily , Historical      ALLOPURINOL PO Take 300 mg by mouth daily , Historical      Rosuvastatin Calcium (CRESTOR PO) Take 40 mg by mouth daily , Historical      VENLAFAXINE HCL PO Take 75 mg by mouth daily (Patient taking differently than prescribed; Prescribed as 75mg twice a day), Historical      ASPIRIN PO Take 81 mg by mouth daily as needed , Historical         STOP taking these medications       OMEPRAZOLE PO Comments:   Reason for Stopping:                   Discharge Instructions:  1) It is Ok to shower.  Please wash both incision and chest tube site daily with soap and water.  You may cover the chest tube site daily with a clean band-aid or dry gauze if it continues to drain.  2) Steri-strips can be removed in 1 week or they will fall off when they are ready.  3) Continue daily use of your Incentive Spirometer, set of 10x in a row, every 1-2 hours while you are awake during the day.  4) No lifting, pushing or pulling >10-15 lbs for 4-6 weeks from the day of your surgery.  No driving while on narcotic pain medications.  5) Flush your jejunostomy feeding tube once daily with 60 cc tap water.  6) Stay on a full liquid diet until you see Dr. Jeter on Nov. 22, at which point he will advance you to a soft mechanical diet.    Follow-Up Care:  1) Follow up with Elaine Su PA-C/Dr. Jeter at the [place] [facility] in [place] ([address] [place] [address], [address], [place] [address]).  Call Suzy at [phone] to schedule the appointment.  2) Follow up with Primary Care Provider, Joshua Gonsalez within 1 month of discharge  for routine post-surgical care, wound check and follow up.  Please call [phone] to arrange this appointment.     Data   Most Recent 3 CBC's:  Recent Labs   Lab Test  11/13/17   0745  11/12/17   0750  11/11/17   0719  11/10/17   0708  11/09/17   0618  11/07/17   1011   WBC   --    --   5.9   --   8.6  5.2   HGB  8.6*  8.1*  8.7*  8.7*  9.3*  11.1*   MCV   --    --   100   --   102*  98   PLT  209   --   186  170  181  222      Most Recent 3 BMP's:  Recent Labs   Lab Test  11/14/17   1442  11/14/17   0810  11/13/17   1714  11/13/17   0745   11/12/17   0750  11/11/17   0719  11/10/17   0708   NA   --    --    --    --    --   138  136  138   POTASSIUM  3.8  3.3*  3.5  3.2*   < >  3.1*  3.0*  3.4   CHLORIDE   --    --    --    --    --   103  100  104   CO2   --    --    --    --    --   26  26  26   BUN   --    --    --    --    --   18  16  21   CR   --    --    --   1.17   --   1.16  1.13  1.21   ANIONGAP   --    --    --    --    --   9  10  8   ELIANA   --    --    --    --    --   8.7  8.5  8.6   GLC   --    --    --    --    --   91  118*  129*    < > = values in this interval not displayed.     Most Recent 2 LFT's:No lab results found.  Most Recent INR's and Anticoagulation Dosing History:  Anticoagulation Dose History     Recent Dosing and Labs Latest Ref Rng & Units 11/7/2017    INR 0.86 - 1.14 0.90        Most Recent 3 Troponin's:No lab results found.  Most Recent Cholesterol Panel:No lab results found.  Most Recent 6 Bacteria Isolates From Any Culture (See EPIC Reports for Culture Details):No lab results found.  Most Recent TSH, T4 and A1c Labs:  Recent Labs   Lab Test  11/08/17   0715   A1C  6.9*         CCPatient Care Team:  Joshua Gonsalez MD as PCP - General (Family Practice)

## 2017-11-18 NOTE — ANESTHESIA POSTPROCEDURE EVALUATION
Patient: Ramon Winter    Procedure(s):  MARILEE BROOKS ESOPHAGOGASTRECTOMY, PLACEMENT OF FEEDING TUBE JEJUNOSOTMY, PYLOROMYOTOMY, LIGATION THORACIC DUCT - Wound Class: II-Clean Contaminated    Diagnosis:ADENOCARCINOMA DISTAL ESOPHAGUS  Diagnosis Additional Information: No value filed.    Anesthesia Type:  General, RSI, ETT    Note:  Anesthesia Post Evaluation    Patient location during evaluation: PACU  Patient participation: Able to fully participate in evaluation  Level of consciousness: awake and alert  Pain management: satisfactory to patient  Airway patency: patent  Cardiovascular status: hemodynamically stable  Respiratory status: acceptable and unassisted  Hydration status: balanced  PONV: none     Anesthetic complications: None          Last vitals:  Vitals:    11/14/17 0900 11/14/17 1234 11/14/17 1241   BP:   116/84   Pulse:   81   Resp: 16 16 16   Temp:   36.4  C (97.6  F)   SpO2:   94%         Electronically Signed By: Cedric Lagos MD  November 18, 2017  11:51 AM

## 2018-03-01 ENCOUNTER — HOSPITAL ENCOUNTER (OUTPATIENT)
Dept: CT IMAGING | Facility: CLINIC | Age: 73
Discharge: HOME | End: 2018-03-01
Attending: INTERNAL MEDICINE | Admitting: INTERNAL MEDICINE
Payer: MEDICARE

## 2018-03-01 DIAGNOSIS — C15.9 MALIGNANT NEOPLASM OF ESOPHAGUS, UNSPECIFIED LOCATION (H): ICD-10-CM

## 2018-03-01 LAB
CREAT BLD-MCNC: 1.3 MG/DL (ref 0.66–1.25)
GFR SERPL CREATININE-BSD FRML MDRD: 54 ML/MIN/1.7M2

## 2018-03-01 PROCEDURE — 25000128 H RX IP 250 OP 636: Performed by: RADIOLOGY

## 2018-03-01 PROCEDURE — 74160 CT ABDOMEN W/CONTRAST: CPT

## 2018-03-01 PROCEDURE — 82565 ASSAY OF CREATININE: CPT

## 2018-03-01 RX ORDER — IOPAMIDOL 755 MG/ML
500 INJECTION, SOLUTION INTRAVASCULAR ONCE
Status: COMPLETED | OUTPATIENT
Start: 2018-03-01 | End: 2018-03-01

## 2018-03-01 RX ADMIN — IOPAMIDOL 99 ML: 755 INJECTION, SOLUTION INTRAVENOUS at 11:11

## 2018-03-01 RX ADMIN — SODIUM CHLORIDE 55 ML: 9 INJECTION, SOLUTION INTRAVENOUS at 11:11

## 2018-06-24 ENCOUNTER — APPOINTMENT (OUTPATIENT)
Dept: CT IMAGING | Facility: CLINIC | Age: 73
End: 2018-06-24
Attending: EMERGENCY MEDICINE
Payer: COMMERCIAL

## 2018-06-24 ENCOUNTER — HOSPITAL ENCOUNTER (OUTPATIENT)
Facility: CLINIC | Age: 73
Setting detail: OBSERVATION
Discharge: HOME | End: 2018-06-25
Attending: EMERGENCY MEDICINE | Admitting: SURGERY
Payer: COMMERCIAL
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
## 2018-06-24 LAB
ABO + RH BLD: NORMAL
ABO + RH BLD: NORMAL
ALBUMIN SERPL-MCNC: 4 G/DL (ref 3.4–5)
ALP SERPL-CCNC: 109 U/L (ref 40–150)
ALT SERPL W P-5'-P-CCNC: 31 U/L (ref 0–70)
ANION GAP SERPL CALCULATED.3IONS-SCNC: 7 MMOL/L (ref 3–14)
AST SERPL W P-5'-P-CCNC: 19 U/L (ref 0–45)
BASOPHILS # BLD AUTO: 0 10E9/L (ref 0–0.2)
BASOPHILS NFR BLD AUTO: 0.6 %
BILIRUB SERPL-MCNC: 0.3 MG/DL (ref 0.2–1.3)
BLD GP AB SCN SERPL QL: NORMAL
BLOOD BANK CMNT PATIENT-IMP: NORMAL
BUN SERPL-MCNC: 37 MG/DL (ref 7–30)
CALCIUM SERPL-MCNC: 9.4 MG/DL (ref 8.5–10.1)
CHLORIDE SERPL-SCNC: 107 MMOL/L (ref 94–109)
CO2 SERPL-SCNC: 26 MMOL/L (ref 20–32)
CREAT BLD-MCNC: 1.8 MG/DL (ref 0.66–1.25)
CREAT SERPL-MCNC: 1.79 MG/DL (ref 0.66–1.25)
DIFFERENTIAL METHOD BLD: ABNORMAL
EOSINOPHIL # BLD AUTO: 0.2 10E9/L (ref 0–0.7)
EOSINOPHIL NFR BLD AUTO: 4.3 %
ERYTHROCYTE [DISTWIDTH] IN BLOOD BY AUTOMATED COUNT: 14.3 % (ref 10–15)
ETHANOL SERPL-MCNC: <0.01 G/DL
GFR SERPL CREATININE-BSD FRML MDRD: 37 ML/MIN/1.7M2
GFR SERPL CREATININE-BSD FRML MDRD: 37 ML/MIN/1.7M2
GLUCOSE BLDC GLUCOMTR-MCNC: 96 MG/DL (ref 70–99)
GLUCOSE SERPL-MCNC: 165 MG/DL (ref 70–99)
HCT VFR BLD AUTO: 39.3 % (ref 40–53)
HGB BLD-MCNC: 13.1 G/DL (ref 13.3–17.7)
IMM GRANULOCYTES # BLD: 0 10E9/L (ref 0–0.4)
IMM GRANULOCYTES NFR BLD: 0.6 %
INR PPP: 0.94 (ref 0.86–1.14)
LYMPHOCYTES # BLD AUTO: 1.1 10E9/L (ref 0.8–5.3)
LYMPHOCYTES NFR BLD AUTO: 20 %
MCH RBC QN AUTO: 33.3 PG (ref 26.5–33)
MCHC RBC AUTO-ENTMCNC: 33.3 G/DL (ref 31.5–36.5)
MCV RBC AUTO: 100 FL (ref 78–100)
MONOCYTES # BLD AUTO: 0.5 10E9/L (ref 0–1.3)
MONOCYTES NFR BLD AUTO: 9.6 %
NEUTROPHILS # BLD AUTO: 3.5 10E9/L (ref 1.6–8.3)
NEUTROPHILS NFR BLD AUTO: 64.9 %
NRBC # BLD AUTO: 0 10*3/UL
NRBC BLD AUTO-RTO: 0 /100
PLATELET # BLD AUTO: 188 10E9/L (ref 150–450)
POTASSIUM SERPL-SCNC: 4.3 MMOL/L (ref 3.4–5.3)
PROT SERPL-MCNC: 7.6 G/DL (ref 6.8–8.8)
RBC # BLD AUTO: 3.93 10E12/L (ref 4.4–5.9)
SODIUM SERPL-SCNC: 140 MMOL/L (ref 133–144)
SPECIMEN EXP DATE BLD: NORMAL
WBC # BLD AUTO: 5.4 10E9/L (ref 4–11)

## 2018-06-24 PROCEDURE — G0378 HOSPITAL OBSERVATION PER HR: HCPCS

## 2018-06-24 PROCEDURE — A9270 NON-COVERED ITEM OR SERVICE: HCPCS | Mod: GY | Performed by: SURGERY

## 2018-06-24 PROCEDURE — 25000128 H RX IP 250 OP 636: Performed by: EMERGENCY MEDICINE

## 2018-06-24 PROCEDURE — 86901 BLOOD TYPING SEROLOGIC RH(D): CPT | Performed by: EMERGENCY MEDICINE

## 2018-06-24 PROCEDURE — 85610 PROTHROMBIN TIME: CPT | Performed by: EMERGENCY MEDICINE

## 2018-06-24 PROCEDURE — 25000132 ZZH RX MED GY IP 250 OP 250 PS 637: Performed by: SURGERY

## 2018-06-24 PROCEDURE — 76705 ECHO EXAM OF ABDOMEN: CPT

## 2018-06-24 PROCEDURE — 99285 EMERGENCY DEPT VISIT HI MDM: CPT | Mod: 25

## 2018-06-24 PROCEDURE — 96361 HYDRATE IV INFUSION ADD-ON: CPT

## 2018-06-24 PROCEDURE — 86900 BLOOD TYPING SEROLOGIC ABO: CPT | Performed by: EMERGENCY MEDICINE

## 2018-06-24 PROCEDURE — 93005 ELECTROCARDIOGRAM TRACING: CPT

## 2018-06-24 PROCEDURE — 85025 COMPLETE CBC W/AUTO DIFF WBC: CPT | Performed by: EMERGENCY MEDICINE

## 2018-06-24 PROCEDURE — 70450 CT HEAD/BRAIN W/O DYE: CPT

## 2018-06-24 PROCEDURE — 96374 THER/PROPH/DIAG INJ IV PUSH: CPT

## 2018-06-24 PROCEDURE — 71260 CT THORAX DX C+: CPT

## 2018-06-24 PROCEDURE — 80053 COMPREHEN METABOLIC PANEL: CPT | Performed by: EMERGENCY MEDICINE

## 2018-06-24 PROCEDURE — 72125 CT NECK SPINE W/O DYE: CPT

## 2018-06-24 PROCEDURE — 99225 ZZC SUBSEQUENT OBSERVATION CARE,LEVEL II: CPT | Performed by: SURGERY

## 2018-06-24 PROCEDURE — 00000146 ZZHCL STATISTIC GLUCOSE BY METER IP

## 2018-06-24 PROCEDURE — 86850 RBC ANTIBODY SCREEN: CPT | Performed by: EMERGENCY MEDICINE

## 2018-06-24 PROCEDURE — 99234 HOSP IP/OBS SM DT SF/LOW 45: CPT | Performed by: SURGERY

## 2018-06-24 PROCEDURE — 80320 DRUG SCREEN QUANTALCOHOLS: CPT | Performed by: EMERGENCY MEDICINE

## 2018-06-24 PROCEDURE — 82565 ASSAY OF CREATININE: CPT

## 2018-06-24 RX ORDER — GLIPIZIDE 5 MG/1
5 TABLET, FILM COATED, EXTENDED RELEASE ORAL DAILY
COMMUNITY
End: 2018-06-24

## 2018-06-24 RX ORDER — OXYCODONE HYDROCHLORIDE 5 MG/1
5 TABLET ORAL EVERY 4 HOURS PRN
Status: DISCONTINUED | OUTPATIENT
Start: 2018-06-24 | End: 2018-06-25 | Stop reason: HOSPADM

## 2018-06-24 RX ORDER — ALLOPURINOL 300 MG/1
300 TABLET ORAL EVERY EVENING
Status: DISCONTINUED | OUTPATIENT
Start: 2018-06-24 | End: 2018-06-25 | Stop reason: HOSPADM

## 2018-06-24 RX ORDER — HYDROMORPHONE HYDROCHLORIDE 1 MG/ML
.3-.5 INJECTION, SOLUTION INTRAMUSCULAR; INTRAVENOUS; SUBCUTANEOUS
Status: DISCONTINUED | OUTPATIENT
Start: 2018-06-24 | End: 2018-06-25 | Stop reason: HOSPADM

## 2018-06-24 RX ORDER — ZOLPIDEM TARTRATE 5 MG/1
5 TABLET ORAL
Status: ON HOLD | COMMUNITY
End: 2020-02-05

## 2018-06-24 RX ORDER — NICOTINE POLACRILEX 4 MG
15-30 LOZENGE BUCCAL
Status: DISCONTINUED | OUTPATIENT
Start: 2018-06-24 | End: 2018-06-25 | Stop reason: HOSPADM

## 2018-06-24 RX ORDER — LISINOPRIL 10 MG/1
10 TABLET ORAL EVERY EVENING
Status: DISCONTINUED | OUTPATIENT
Start: 2018-06-24 | End: 2018-06-25 | Stop reason: HOSPADM

## 2018-06-24 RX ORDER — ROSUVASTATIN CALCIUM 20 MG/1
40 TABLET, COATED ORAL EVERY EVENING
Status: DISCONTINUED | OUTPATIENT
Start: 2018-06-24 | End: 2018-06-25 | Stop reason: HOSPADM

## 2018-06-24 RX ORDER — ATENOLOL AND CHLORTHALIDONE TABLET 100; 25 MG/1; MG/1
1 TABLET ORAL DAILY
Status: DISCONTINUED | OUTPATIENT
Start: 2018-06-24 | End: 2018-06-24

## 2018-06-24 RX ORDER — MORPHINE SULFATE 4 MG/ML
4 INJECTION, SOLUTION INTRAMUSCULAR; INTRAVENOUS ONCE
Status: COMPLETED | OUTPATIENT
Start: 2018-06-24 | End: 2018-06-24

## 2018-06-24 RX ORDER — ATENOLOL 100 MG/1
100 TABLET ORAL EVERY EVENING
Status: DISCONTINUED | OUTPATIENT
Start: 2018-06-24 | End: 2018-06-25 | Stop reason: HOSPADM

## 2018-06-24 RX ORDER — GLIPIZIDE 5 MG/1
5 TABLET ORAL EVERY EVENING
COMMUNITY
End: 2020-02-05

## 2018-06-24 RX ORDER — ALLOPURINOL 100 MG/1
100 TABLET ORAL DAILY
COMMUNITY

## 2018-06-24 RX ORDER — ZOLPIDEM TARTRATE 5 MG/1
5 TABLET ORAL
Status: DISCONTINUED | OUTPATIENT
Start: 2018-06-24 | End: 2018-06-25 | Stop reason: HOSPADM

## 2018-06-24 RX ORDER — ONDANSETRON 2 MG/ML
4 INJECTION INTRAMUSCULAR; INTRAVENOUS EVERY 6 HOURS PRN
Status: DISCONTINUED | OUTPATIENT
Start: 2018-06-24 | End: 2018-06-25 | Stop reason: HOSPADM

## 2018-06-24 RX ORDER — LISINOPRIL 20 MG/1
20 TABLET ORAL EVERY EVENING
Status: ON HOLD | COMMUNITY
End: 2020-02-12

## 2018-06-24 RX ORDER — DEXTROSE MONOHYDRATE 25 G/50ML
25-50 INJECTION, SOLUTION INTRAVENOUS
Status: DISCONTINUED | OUTPATIENT
Start: 2018-06-24 | End: 2018-06-25 | Stop reason: HOSPADM

## 2018-06-24 RX ORDER — VENLAFAXINE HYDROCHLORIDE 75 MG/1
75 TABLET, EXTENDED RELEASE ORAL DAILY
Status: DISCONTINUED | OUTPATIENT
Start: 2018-06-25 | End: 2018-06-25

## 2018-06-24 RX ORDER — SODIUM CHLORIDE 9 MG/ML
1000 INJECTION, SOLUTION INTRAVENOUS CONTINUOUS
Status: DISCONTINUED | OUTPATIENT
Start: 2018-06-24 | End: 2018-06-24

## 2018-06-24 RX ORDER — CHLORTHALIDONE 25 MG/1
25 TABLET ORAL EVERY EVENING
Status: DISCONTINUED | OUTPATIENT
Start: 2018-06-24 | End: 2018-06-25 | Stop reason: HOSPADM

## 2018-06-24 RX ORDER — NALOXONE HYDROCHLORIDE 0.4 MG/ML
.1-.4 INJECTION, SOLUTION INTRAMUSCULAR; INTRAVENOUS; SUBCUTANEOUS
Status: DISCONTINUED | OUTPATIENT
Start: 2018-06-24 | End: 2018-06-25 | Stop reason: HOSPADM

## 2018-06-24 RX ORDER — ONDANSETRON 4 MG/1
4 TABLET, ORALLY DISINTEGRATING ORAL EVERY 6 HOURS PRN
Status: DISCONTINUED | OUTPATIENT
Start: 2018-06-24 | End: 2018-06-25 | Stop reason: HOSPADM

## 2018-06-24 RX ORDER — IOPAMIDOL 755 MG/ML
500 INJECTION, SOLUTION INTRAVASCULAR ONCE
Status: COMPLETED | OUTPATIENT
Start: 2018-06-24 | End: 2018-06-24

## 2018-06-24 RX ORDER — ASPIRIN 81 MG/1
81 TABLET ORAL EVERY MORNING
Status: DISCONTINUED | OUTPATIENT
Start: 2018-06-25 | End: 2018-06-25 | Stop reason: HOSPADM

## 2018-06-24 RX ORDER — ROSUVASTATIN CALCIUM 40 MG/1
40 TABLET, COATED ORAL EVERY EVENING
Status: ON HOLD | COMMUNITY
End: 2020-02-12

## 2018-06-24 RX ORDER — VENLAFAXINE HYDROCHLORIDE 75 MG/1
75 TABLET, EXTENDED RELEASE ORAL EVERY MORNING
COMMUNITY
End: 2020-02-05

## 2018-06-24 RX ORDER — GLIPIZIDE 2.5 MG/1
5 TABLET, EXTENDED RELEASE ORAL
Status: DISCONTINUED | OUTPATIENT
Start: 2018-06-24 | End: 2018-06-25 | Stop reason: HOSPADM

## 2018-06-24 RX ADMIN — ZOLPIDEM TARTRATE 5 MG: 5 TABLET, COATED ORAL at 23:54

## 2018-06-24 RX ADMIN — CHLORTHALIDONE 25 MG: 25 TABLET ORAL at 21:39

## 2018-06-24 RX ADMIN — ROSUVASTATIN CALCIUM 40 MG: 20 TABLET, FILM COATED ORAL at 21:38

## 2018-06-24 RX ADMIN — ALLOPURINOL 300 MG: 300 TABLET ORAL at 20:32

## 2018-06-24 RX ADMIN — MORPHINE SULFATE 4 MG: 4 INJECTION INTRAVENOUS at 18:39

## 2018-06-24 RX ADMIN — OMEPRAZOLE 20 MG: 20 CAPSULE, DELAYED RELEASE ORAL at 21:38

## 2018-06-24 RX ADMIN — METFORMIN HYDROCHLORIDE 1000 MG: 500 TABLET ORAL at 20:32

## 2018-06-24 RX ADMIN — IOPAMIDOL 99 ML: 755 INJECTION, SOLUTION INTRAVENOUS at 17:27

## 2018-06-24 RX ADMIN — SODIUM CHLORIDE 64 ML: 9 INJECTION, SOLUTION INTRAVENOUS at 17:27

## 2018-06-24 RX ADMIN — GLIPIZIDE 5 MG: 2.5 TABLET, FILM COATED, EXTENDED RELEASE ORAL at 20:32

## 2018-06-24 RX ADMIN — OXYCODONE HYDROCHLORIDE 5 MG: 5 TABLET ORAL at 22:27

## 2018-06-24 RX ADMIN — SODIUM CHLORIDE 1000 ML: 9 INJECTION, SOLUTION INTRAVENOUS at 18:44

## 2018-06-24 RX ADMIN — ATENOLOL 100 MG: 100 TABLET ORAL at 21:39

## 2018-06-24 RX ADMIN — LISINOPRIL 10 MG: 10 TABLET ORAL at 20:32

## 2018-06-24 RX ADMIN — SODIUM CHLORIDE 1000 ML: 9 INJECTION, SOLUTION INTRAVENOUS at 16:44

## 2018-06-24 ASSESSMENT — ACTIVITIES OF DAILY LIVING (ADL)
AMBULATION: 0-->INDEPENDENT
BATHING: 0-->INDEPENDENT
TOILETING: 0-->INDEPENDENT
SWALLOWING: 0-->SWALLOWS FOODS/LIQUIDS WITHOUT DIFFICULTY
DRESS: 0-->INDEPENDENT
FALL_HISTORY_WITHIN_LAST_SIX_MONTHS: NO
RETIRED_COMMUNICATION: 0-->UNDERSTANDS/COMMUNICATES WITHOUT DIFFICULTY
RETIRED_EATING: 0-->INDEPENDENT
TRANSFERRING: 0-->INDEPENDENT
COGNITION: 0 - NO COGNITION ISSUES REPORTED

## 2018-06-24 ASSESSMENT — ENCOUNTER SYMPTOMS
ARTHRALGIAS: 1
NECK PAIN: 1

## 2018-06-24 ASSESSMENT — PAIN DESCRIPTION - DESCRIPTORS
DESCRIPTORS: ACHING
DESCRIPTORS: ACHING

## 2018-06-24 NOTE — IP AVS SNAPSHOT
[place] [address] [facility]    [address]    [facility] [address]    Phone:  [phone]    Fax:  [phone]                                       After Visit Summary   6/24/2018    Ramon Winter    MRN: [id number]           After Visit Summary Signature Page     I have received my discharge instructions, and my questions have been answered. I have discussed any challenges I see with this plan with the nurse or doctor.    ..........................................................................................................................................  Patient/Patient Representative Signature      ..........................................................................................................................................  Patient Representative Print Name and Relationship to Patient    ..................................................               ................................................  Date                                            Time    ..........................................................................................................................................  Reviewed by Signature/Title    ...................................................              ..............................................  Date                                                            Time
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
## 2018-06-24 NOTE — ED NOTES
Bed: ED30  Expected date: 6/24/18  Expected time: 4:08 PM  Means of arrival: Ambulance  Comments:  Sharona Allison

## 2018-06-25 ENCOUNTER — APPOINTMENT (OUTPATIENT)
Dept: OCCUPATIONAL THERAPY | Facility: CLINIC | Age: 73
End: 2018-06-25
Attending: SURGERY
Payer: COMMERCIAL

## 2018-06-25 VITALS
DIASTOLIC BLOOD PRESSURE: 71 MMHG | HEART RATE: 58 BPM | SYSTOLIC BLOOD PRESSURE: 122 MMHG | RESPIRATION RATE: 20 BRPM | BODY MASS INDEX: 29.52 KG/M2 | WEIGHT: 199.9 LBS | OXYGEN SATURATION: 97 % | TEMPERATURE: 98.6 F

## 2018-06-25 LAB
GLUCOSE BLDC GLUCOMTR-MCNC: 125 MG/DL (ref 70–99)
INTERPRETATION ECG - MUSE: NORMAL

## 2018-06-25 PROCEDURE — 97535 SELF CARE MNGMENT TRAINING: CPT | Mod: GO

## 2018-06-25 PROCEDURE — 97110 THERAPEUTIC EXERCISES: CPT | Mod: GO

## 2018-06-25 PROCEDURE — G0378 HOSPITAL OBSERVATION PER HR: HCPCS

## 2018-06-25 PROCEDURE — 40000133 ZZH STATISTIC OT WARD VISIT

## 2018-06-25 PROCEDURE — 97165 OT EVAL LOW COMPLEX 30 MIN: CPT | Mod: GO

## 2018-06-25 PROCEDURE — 25000132 ZZH RX MED GY IP 250 OP 250 PS 637: Performed by: SURGERY

## 2018-06-25 PROCEDURE — A9270 NON-COVERED ITEM OR SERVICE: HCPCS | Mod: GY | Performed by: SURGERY

## 2018-06-25 PROCEDURE — 00000146 ZZHCL STATISTIC GLUCOSE BY METER IP

## 2018-06-25 RX ORDER — VENLAFAXINE HYDROCHLORIDE 75 MG/1
75 CAPSULE, EXTENDED RELEASE ORAL DAILY
Status: DISCONTINUED | OUTPATIENT
Start: 2018-06-26 | End: 2018-06-25 | Stop reason: HOSPADM

## 2018-06-25 RX ORDER — OXYCODONE HYDROCHLORIDE 5 MG/1
5 TABLET ORAL EVERY 4 HOURS PRN
Qty: 12 TABLET | Refills: 0 | Status: SHIPPED | OUTPATIENT
Start: 2018-06-25 | End: 2018-10-17

## 2018-06-25 RX ADMIN — OXYCODONE HYDROCHLORIDE 5 MG: 5 TABLET ORAL at 02:18

## 2018-06-25 RX ADMIN — OXYCODONE HYDROCHLORIDE 5 MG: 5 TABLET ORAL at 15:00

## 2018-06-25 RX ADMIN — ASPIRIN 81 MG: 81 TABLET, COATED ORAL at 08:20

## 2018-06-25 RX ADMIN — VENLAFAXINE HYDROCHLORIDE 75 MG: 75 TABLET, EXTENDED RELEASE ORAL at 08:20

## 2018-06-25 ASSESSMENT — ACTIVITIES OF DAILY LIVING (ADL): PREVIOUS_RESPONSIBILITIES: MEAL PREP;HOUSEKEEPING;SHOPPING;YARDWORK;MEDICATION MANAGEMENT;FINANCES;DRIVING
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
## 2018-06-25 NOTE — PLAN OF CARE
Problem: Patient Care Overview  Goal: Plan of Care/Patient Progress Review  Outcome: No Change  Pt admitted this evening around 2000. A&O, up in room with SBA, Tier A activity level. VSS, afebrile + sats maintained at high-90's on RA. C/o pain to R shoulder/neck/chest, no bruising noted however small abrasion. Provided heating pad + 5mg PRN oxycodone x1. Denies dizziness, shortness of breath or abd pain. Voiding adequately. Probable DC tomorrow.    Problem: Diabetes Comorbidity  Goal: Diabetes  Patient comorbidity will be monitored for signs and symptoms of hyperglycemia or hypoglycemia. Problems will be absent, minimized or managed by discharge/transition of care.  Outcome: No Change  High CHO diet, BG 96. Glipizide and metformin with supper.
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
## 2018-06-25 NOTE — PROGRESS NOTES
Pt to D/C to home.  Pt provided with d/c instructions, including new medications, when medications were last given, and when to take them again.  Pt also informed to f/u with primary care provider in 2-3 days. CT scan scheduled for July 3rd at [facility].  Pt verbalized understanding of all d/c and f/u instructions.  All questions were answered at this time.  Copy of paperwork sent with pt.  Medication (oxycodone) x 12 sent with pt.  Wife to provide transport.  All personal belongings sent with pt (clothing, glasses, watch, right hearing aid) .
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
## 2018-06-25 NOTE — PLAN OF CARE
Problem: Patient Care Overview  Goal: Individualization & Mutuality  Outcome: No Change  Pt up in room ---  Denies nausea appetite good----  rx on chart---co pain on right side of neck, shoulder and chest wall----steady on feet when up --continue to monitor

## 2018-06-25 NOTE — PHARMACY-ADMISSION MEDICATION HISTORY
Admission medication history interview status for this patient is complete. See [facility] admission navigator for allergy information, prior to admission medications and immunization status.     Medication history interview source(s):Patient  Medication history resources (including written lists, pill bottles, clinic record):None  Primary pharmacy: [facility] ([place], [place])    Changes made to PTA medication list:  Added: Omeprazole  Deleted: Tylenol  Changed: Zolpidem (5mg PO QHS PRN)    Actions taken by pharmacist (provider contacted, etc):None     Additional medication history information:None    Medication reconciliation/reorder completed by provider prior to medication history? No    Prior to Admission medications    Medication Sig Last Dose Taking? Auth Provider   allopurinol (ZYLOPRIM) 300 MG tablet Take 300 mg by mouth every evening 6/23/2018 at PM Yes Unknown, Entered By History   aspirin (ASPIRIN ADULT LOW STRENGTH) 81 MG EC tablet Take 81 mg by mouth every morning 6/24/2018 at AM Yes Unknown, Entered By History   atenolol-chlorthalidone (TENORETIC) 100-25 MG per tablet Take 1 tablet by mouth every evening  6/23/2018 at PM Yes Reported, Patient   glipiZIDE (GLUCOTROL) 5 MG tablet Take 5 mg by mouth every evening 6/23/2018 at PM Yes Unknown, Entered By History   lisinopril (PRINIVIL/ZESTRIL) 10 MG tablet Take 10 mg by mouth every evening 6/23/2018 at PM Yes Unknown, Entered By History   metFORMIN (GLUCOPHAGE) 1000 MG tablet Take 1,000 mg by mouth daily (with dinner)  6/23/2018 at PM Yes Unknown, Entered By History   omeprazole (PRILOSEC) 20 MG CR capsule Take 20 mg by mouth every evening  6/23/2018 at PM Yes Unknown, Entered By History   rosuvastatin (CRESTOR) 40 MG tablet Take 40 mg by mouth every evening 6/23/2018 at PM Yes Unknown, Entered By History   venlafaxine (EFFEXOR-ER) 75 MG TB24 24 hr tablet Take 75 mg by mouth every morning  6/24/2018 at AM Yes Unknown, Entered By History   zolpidem (AMBIEN) 5  MG tablet Take 5 mg by mouth nightly as needed for sleep 6/23/2018 at HS Yes Unknown, Entered By History
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
## 2018-07-03 ENCOUNTER — HOSPITAL ENCOUNTER (OUTPATIENT)
Dept: CT IMAGING | Facility: CLINIC | Age: 73
Discharge: HOME | End: 2018-07-03
Attending: INTERNAL MEDICINE | Admitting: INTERNAL MEDICINE
Payer: MEDICARE

## 2018-07-03 DIAGNOSIS — C15.9 MALIGNANT NEOPLASM OF ESOPHAGUS, UNSPECIFIED LOCATION (H): ICD-10-CM

## 2018-07-03 LAB
CREAT BLD-MCNC: 1.6 MG/DL (ref 0.66–1.25)
GFR SERPL CREATININE-BSD FRML MDRD: 43 ML/MIN/1.7M2
RADIOLOGIST FLAGS: ABNORMAL

## 2018-07-03 PROCEDURE — 74177 CT ABD & PELVIS W/CONTRAST: CPT

## 2018-07-03 PROCEDURE — 25000128 H RX IP 250 OP 636: Performed by: INTERNAL MEDICINE

## 2018-07-03 PROCEDURE — 82565 ASSAY OF CREATININE: CPT

## 2018-07-03 RX ORDER — IOPAMIDOL 755 MG/ML
500 INJECTION, SOLUTION INTRAVASCULAR ONCE
Status: COMPLETED | OUTPATIENT
Start: 2018-07-03 | End: 2018-07-03

## 2018-07-03 RX ADMIN — IOPAMIDOL 79 ML: 755 INJECTION, SOLUTION INTRAVENOUS at 13:37

## 2018-07-03 RX ADMIN — SODIUM CHLORIDE 59 ML: 900 INJECTION, SOLUTION INTRAVENOUS at 13:37
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
## 2018-10-18 ENCOUNTER — TRANSFERRED RECORDS (OUTPATIENT)
Dept: HEALTH INFORMATION MANAGEMENT | Facility: CLINIC | Age: 73
End: 2018-10-18

## 2018-10-18 LAB
CREAT SERPL-MCNC: 1.8 MG/DL (ref 0.6–1.3)
GLUCOSE SERPL-MCNC: 260 MG/DL (ref 70–99)
POTASSIUM SERPL-SCNC: 4.4 MMOL/L (ref 3.5–5.1)

## 2018-10-22 ENCOUNTER — HOSPITAL ENCOUNTER (OUTPATIENT)
Facility: CLINIC | Age: 73
Discharge: HOME | End: 2018-10-22
Attending: INTERNAL MEDICINE | Admitting: INTERNAL MEDICINE
Payer: MEDICARE

## 2018-10-22 ENCOUNTER — SURGERY (OUTPATIENT)
Age: 73
End: 2018-10-22

## 2018-10-22 ENCOUNTER — ANESTHESIA EVENT (OUTPATIENT)
Dept: SURGERY | Facility: CLINIC | Age: 73
End: 2018-10-22
Payer: MEDICARE

## 2018-10-22 ENCOUNTER — ANESTHESIA (OUTPATIENT)
Dept: SURGERY | Facility: CLINIC | Age: 73
End: 2018-10-22
Payer: MEDICARE

## 2018-10-22 VITALS
HEIGHT: 69 IN | DIASTOLIC BLOOD PRESSURE: 73 MMHG | BODY MASS INDEX: 30.07 KG/M2 | WEIGHT: 203 LBS | SYSTOLIC BLOOD PRESSURE: 108 MMHG | OXYGEN SATURATION: 98 % | RESPIRATION RATE: 16 BRPM | TEMPERATURE: 97 F

## 2018-10-22 LAB
GLUCOSE BLDC GLUCOMTR-MCNC: 173 MG/DL (ref 70–99)
UPPER EUS: NORMAL

## 2018-10-22 PROCEDURE — 27210794 ZZH OR GENERAL SUPPLY STERILE: Performed by: INTERNAL MEDICINE

## 2018-10-22 PROCEDURE — 25000125 ZZHC RX 250: Performed by: NURSE ANESTHETIST, CERTIFIED REGISTERED

## 2018-10-22 PROCEDURE — 88172 CYTP DX EVAL FNA 1ST EA SITE: CPT | Mod: 26 | Performed by: INTERNAL MEDICINE

## 2018-10-22 PROCEDURE — 88173 CYTOPATH EVAL FNA REPORT: CPT | Mod: 26 | Performed by: INTERNAL MEDICINE

## 2018-10-22 PROCEDURE — 00000155 ZZHCL STATISTIC H-CELL BLOCK W/STAIN: Performed by: INTERNAL MEDICINE

## 2018-10-22 PROCEDURE — 88305 TISSUE EXAM BY PATHOLOGIST: CPT | Mod: 26 | Performed by: INTERNAL MEDICINE

## 2018-10-22 PROCEDURE — 00000093 ZZHCL STATISTIC COURTESY CONSULT: Performed by: INTERNAL MEDICINE

## 2018-10-22 PROCEDURE — 71000027 ZZH RECOVERY PHASE 2 EACH 15 MINS: Performed by: INTERNAL MEDICINE

## 2018-10-22 PROCEDURE — 88172 CYTP DX EVAL FNA 1ST EA SITE: CPT | Performed by: INTERNAL MEDICINE

## 2018-10-22 PROCEDURE — 37000009 ZZH ANESTHESIA TECHNICAL FEE, EACH ADDTL 15 MIN: Performed by: INTERNAL MEDICINE

## 2018-10-22 PROCEDURE — 36000056 ZZH SURGERY LEVEL 3 1ST 30 MIN: Performed by: INTERNAL MEDICINE

## 2018-10-22 PROCEDURE — 25000128 H RX IP 250 OP 636: Performed by: ANESTHESIOLOGY

## 2018-10-22 PROCEDURE — 88305 TISSUE EXAM BY PATHOLOGIST: CPT | Performed by: INTERNAL MEDICINE

## 2018-10-22 PROCEDURE — 25000128 H RX IP 250 OP 636: Performed by: NURSE ANESTHETIST, CERTIFIED REGISTERED

## 2018-10-22 PROCEDURE — 82962 GLUCOSE BLOOD TEST: CPT

## 2018-10-22 PROCEDURE — 40000799 ZZH STATISTIC EUS (OR PROCEDURE): Performed by: INTERNAL MEDICINE

## 2018-10-22 PROCEDURE — 36000058 ZZH SURGERY LEVEL 3 EA 15 ADDTL MIN: Performed by: INTERNAL MEDICINE

## 2018-10-22 PROCEDURE — 40000306 ZZH STATISTIC PRE PROC ASSESS II: Performed by: INTERNAL MEDICINE

## 2018-10-22 PROCEDURE — 88173 CYTOPATH EVAL FNA REPORT: CPT | Performed by: INTERNAL MEDICINE

## 2018-10-22 PROCEDURE — 37000008 ZZH ANESTHESIA TECHNICAL FEE, 1ST 30 MIN: Performed by: INTERNAL MEDICINE

## 2018-10-22 RX ORDER — GLYCOPYRROLATE 0.2 MG/ML
INJECTION, SOLUTION INTRAMUSCULAR; INTRAVENOUS PRN
Status: DISCONTINUED | OUTPATIENT
Start: 2018-10-22 | End: 2018-10-22

## 2018-10-22 RX ORDER — SODIUM CHLORIDE, SODIUM LACTATE, POTASSIUM CHLORIDE, CALCIUM CHLORIDE 600; 310; 30; 20 MG/100ML; MG/100ML; MG/100ML; MG/100ML
INJECTION, SOLUTION INTRAVENOUS CONTINUOUS
Status: DISCONTINUED | OUTPATIENT
Start: 2018-10-22 | End: 2018-10-22 | Stop reason: HOSPADM

## 2018-10-22 RX ORDER — NALOXONE HYDROCHLORIDE 0.4 MG/ML
.1-.4 INJECTION, SOLUTION INTRAMUSCULAR; INTRAVENOUS; SUBCUTANEOUS
Status: DISCONTINUED | OUTPATIENT
Start: 2018-10-22 | End: 2018-10-22 | Stop reason: HOSPADM

## 2018-10-22 RX ORDER — FLUMAZENIL 0.1 MG/ML
0.2 INJECTION, SOLUTION INTRAVENOUS
Status: DISCONTINUED | OUTPATIENT
Start: 2018-10-22 | End: 2018-10-22 | Stop reason: HOSPADM

## 2018-10-22 RX ORDER — ALBUTEROL SULFATE 0.83 MG/ML
2.5 SOLUTION RESPIRATORY (INHALATION) EVERY 4 HOURS PRN
Status: DISCONTINUED | OUTPATIENT
Start: 2018-10-22 | End: 2018-10-22 | Stop reason: HOSPADM

## 2018-10-22 RX ORDER — FENTANYL CITRATE 50 UG/ML
25-50 INJECTION, SOLUTION INTRAMUSCULAR; INTRAVENOUS
Status: DISCONTINUED | OUTPATIENT
Start: 2018-10-22 | End: 2018-10-22 | Stop reason: HOSPADM

## 2018-10-22 RX ORDER — HYDRALAZINE HYDROCHLORIDE 20 MG/ML
2.5-5 INJECTION INTRAMUSCULAR; INTRAVENOUS EVERY 10 MIN PRN
Status: DISCONTINUED | OUTPATIENT
Start: 2018-10-22 | End: 2018-10-22 | Stop reason: HOSPADM

## 2018-10-22 RX ORDER — EPHEDRINE SULFATE 50 MG/ML
INJECTION, SOLUTION INTRAMUSCULAR; INTRAVENOUS; SUBCUTANEOUS PRN
Status: DISCONTINUED | OUTPATIENT
Start: 2018-10-22 | End: 2018-10-22

## 2018-10-22 RX ORDER — ONDANSETRON 2 MG/ML
4 INJECTION INTRAMUSCULAR; INTRAVENOUS EVERY 30 MIN PRN
Status: DISCONTINUED | OUTPATIENT
Start: 2018-10-22 | End: 2018-10-22 | Stop reason: HOSPADM

## 2018-10-22 RX ORDER — FENTANYL CITRATE 50 UG/ML
INJECTION, SOLUTION INTRAMUSCULAR; INTRAVENOUS PRN
Status: DISCONTINUED | OUTPATIENT
Start: 2018-10-22 | End: 2018-10-22

## 2018-10-22 RX ORDER — MEPERIDINE HYDROCHLORIDE 25 MG/ML
12.5 INJECTION INTRAMUSCULAR; INTRAVENOUS; SUBCUTANEOUS
Status: DISCONTINUED | OUTPATIENT
Start: 2018-10-22 | End: 2018-10-22 | Stop reason: HOSPADM

## 2018-10-22 RX ORDER — LIDOCAINE 40 MG/G
CREAM TOPICAL
Status: DISCONTINUED | OUTPATIENT
Start: 2018-10-22 | End: 2018-10-22 | Stop reason: HOSPADM

## 2018-10-22 RX ORDER — ONDANSETRON 4 MG/1
4 TABLET, ORALLY DISINTEGRATING ORAL EVERY 30 MIN PRN
Status: DISCONTINUED | OUTPATIENT
Start: 2018-10-22 | End: 2018-10-22 | Stop reason: HOSPADM

## 2018-10-22 RX ORDER — PROPOFOL 10 MG/ML
INJECTION, EMULSION INTRAVENOUS CONTINUOUS PRN
Status: DISCONTINUED | OUTPATIENT
Start: 2018-10-22 | End: 2018-10-22

## 2018-10-22 RX ORDER — HYDROMORPHONE HYDROCHLORIDE 1 MG/ML
.3-.5 INJECTION, SOLUTION INTRAMUSCULAR; INTRAVENOUS; SUBCUTANEOUS EVERY 10 MIN PRN
Status: DISCONTINUED | OUTPATIENT
Start: 2018-10-22 | End: 2018-10-22 | Stop reason: HOSPADM

## 2018-10-22 RX ORDER — KETAMINE HYDROCHLORIDE 10 MG/ML
INJECTION INTRAMUSCULAR; INTRAVENOUS PRN
Status: DISCONTINUED | OUTPATIENT
Start: 2018-10-22 | End: 2018-10-22

## 2018-10-22 RX ADMIN — KETAMINE HCL-NACL SOLN PREF SY 50 MG/5ML-0.9% (10MG/ML) 10 MG: 10 SOLUTION PREFILLED SYRINGE at 13:34

## 2018-10-22 RX ADMIN — FENTANYL CITRATE 25 MCG: 50 INJECTION, SOLUTION INTRAMUSCULAR; INTRAVENOUS at 13:35

## 2018-10-22 RX ADMIN — PHENYLEPHRINE HYDROCHLORIDE 100 MCG: 10 INJECTION, SOLUTION INTRAMUSCULAR; INTRAVENOUS; SUBCUTANEOUS at 13:44

## 2018-10-22 RX ADMIN — GLYCOPYRROLATE 0.1 MG: 0.2 INJECTION, SOLUTION INTRAMUSCULAR; INTRAVENOUS at 13:34

## 2018-10-22 RX ADMIN — SODIUM CHLORIDE, POTASSIUM CHLORIDE, SODIUM LACTATE AND CALCIUM CHLORIDE: 600; 310; 30; 20 INJECTION, SOLUTION INTRAVENOUS at 12:57

## 2018-10-22 RX ADMIN — FENTANYL CITRATE 50 MCG: 50 INJECTION, SOLUTION INTRAMUSCULAR; INTRAVENOUS at 12:59

## 2018-10-22 RX ADMIN — Medication 5 MG: at 13:30

## 2018-10-22 RX ADMIN — PROPOFOL 50 MCG/KG/MIN: 10 INJECTION, EMULSION INTRAVENOUS at 13:01

## 2018-10-22 RX ADMIN — KETAMINE HCL-NACL SOLN PREF SY 50 MG/5ML-0.9% (10MG/ML) 10 MG: 10 SOLUTION PREFILLED SYRINGE at 13:10

## 2018-10-22 RX ADMIN — GLYCOPYRROLATE 0.2 MG: 0.2 INJECTION, SOLUTION INTRAMUSCULAR; INTRAVENOUS at 13:10

## 2018-10-22 RX ADMIN — Medication 5 MG: at 13:21

## 2018-10-22 RX ADMIN — Medication 10 MG: at 13:19

## 2018-10-22 RX ADMIN — MIDAZOLAM 2 MG: 1 INJECTION INTRAMUSCULAR; INTRAVENOUS at 12:57

## 2018-10-22 RX ADMIN — PHENYLEPHRINE HYDROCHLORIDE 100 MCG: 10 INJECTION, SOLUTION INTRAMUSCULAR; INTRAVENOUS; SUBCUTANEOUS at 13:48

## 2018-10-22 NOTE — IP AVS SNAPSHOT
MRN:[id number]                      After Visit Summary   10/22/2018    Ramon Winter    MRN: [id number]           Thank you!     Thank you for choosing [facility] for your care. Our goal is always to provide you with excellent care. Hearing back from our patients is one way we can continue to improve our services. Please take a few minutes to complete the written survey that you may receive in the mail after you visit. If you would like to speak to someone directly about your visit please contact Patient Relations at [phone]. Thank you!          Patient Information     Date Of Birth          1945        About your hospital stay     You were admitted on:  October 22, 2018 You last received care in the:  [facility] PreOP/PostOP    You were discharged on:  October 22, 2018       Who to Call     For medical emergencies, please call 911.  For non-urgent questions about your medical care, please call your primary care provider or clinic, [phone]  For questions related to your surgery, please call your surgery clinic        Attending Provider     Provider Thai العلي MD Gastroenterology       Primary Care Provider Office Phone # Fax #    Polo Rudolph [phone]      After Care Instructions     Discharge Instructions       No driving or operating machinery until the day after procedure.            Discharge Instructions       Recommend that a responsible adult remain with the patient at home for 24 hours post discharge.            Discharge Instructions       Start with clear liquids, sips of water 1 hour after procedure. If no abdominal pain and gag reflex has returned, advance as tolerated to pre-procedure diet.              Discharge Instructions       Restart home medications.            Discharge Instructions       Check with your Provider when to start anticoagulant medication.            Discharge Instructions        No ALCOHOL 24 hours post procedure.                  Further instructions from your care team       DR. THAI HOLLOWAY M.D.      CLINIC PHONE NUMBER:  [phone]    GENERAL ANESTHESIA OR SEDATION ADULT DISCHARGE INSTRUCTIONS   SPECIAL PRECAUTIONS FOR 24 HOURS AFTER SURGERY    IT IS NOT UNUSUAL TO FEEL LIGHT-HEADED OR FAINT, UP TO 24 HOURS AFTER SURGERY OR WHILE TAKING PAIN MEDICATION.  IF YOU HAVE THESE SYMPTOMS; SIT FOR A FEW MINUTES BEFORE STANDING AND HAVE SOMEONE ASSIST YOU WHEN YOU GET UP TO WALK OR USE THE BATHROOM.    YOU SHOULD REST AND RELAX FOR THE NEXT 24 HOURS AND YOU MUST MAKE ARRANGEMENTS TO HAVE SOMEONE STAY WITH YOU FOR AT LEAST 24 HOURS AFTER YOUR DISCHARGE.  AVOID HAZARDOUS AND STRENUOUS ACTIVITIES.  DO NOT MAKE IMPORTANT DECISIONS FOR 24 HOURS.    DO NOT DRIVE ANY VEHICLE OR OPERATE MECHANICAL EQUIPMENT FOR 24 HOURS FOLLOWING THE END OF YOUR SURGERY.  EVEN THOUGH YOU MAY FEEL NORMAL, YOUR REACTIONS MAY BE AFFECTED BY THE MEDICATION YOU HAVE RECEIVED.    DO NOT DRINK ALCOHOLIC BEVERAGES FOR 24 HOURS FOLLOWING YOUR SURGERY.    DRINK CLEAR LIQUIDS (APPLE JUICE, GINGER ALE, 7-UP, BROTH, ETC.).  PROGRESS TO YOUR REGULAR DIET AS YOU FEEL ABLE.    YOU MAY HAVE A DRY MOUTH, A SORE THROAT, MUSCLES ACHES OR TROUBLE SLEEPING.  THESE SHOULD GO AWAY AFTER 24 HOURS.    CALL YOUR DOCTOR FOR ANY OF THE FOLLOWING:  SIGNS OF INFECTION (FEVER, GROWING TENDERNESS AT THE SURGERY SITE, A LARGE AMOUNT OF DRAINAGE OR BLEEDING, SEVERE PAIN, FOUL-SMELLING DRAINAGE, REDNESS OR SWELLING.    IT HAS BEEN OVER 8 TO 10 HOURS SINCE SURGERY AND YOU ARE STILL NOT ABLE TO URINATE (PASS WATER).     Pending Results     No orders found from 10/20/2018 to 10/23/2018.            Admission Information     Date & Time Provider Department Dept. Phone    10/22/2018 Thai Holloway MD [facility] PreOP/PostOP [phone]      Your Vitals Were     Blood Pressure Temperature Respirations Height Weight Pulse Oximetry     "108/73 97.6  F (36.4  C) (Temporal) 16 1.753 m (5' 9\") 92.1 kg (203 lb) 97%    BMI (Body Mass Index)                   29.98 kg/m2           P[employer]ilo Information     [employer] lets you send messages to your doctor, view your test results, renew your prescriptions, schedule appointments and more. To sign up, go to [email or web address] . Click on \"Log in\" on the left side of the screen, which will take you to the Welcome page. Then click on \"Sign up Now\" on the right side of the page.     You will be asked to enter the access code listed below, as well as some personal information. Please follow the directions to create your username and password.     Your access code is: [email or web address]  Expires: 2019  2:35 PM     Your access code will  in 90 days. If you need help or a new code, please call your [place] clinic or [phone].        Care EveryWhere ID     This is your Care EveryWhere ID. This could be used by other organizations to access your [place] medical records  [id number]        Equal Access to Services     HENRY SIBLEY : Hadii chioma Russo, waaxda luelma, qaybta kaalmada adegurvinder, kim wood. So [facility] [phone].    ATENCIÓN: Si habla español, tiene a rashid disposición servicios gratuitos de asistencia lingüística. Miriam al [phone].    We comply with applicable federal civil rights laws and [place] laws. We do not discriminate on the basis of race, color, national origin, age, disability, sex, sexual orientation, or gender identity.               Review of your medicines      UNREVIEWED medicines. Ask your doctor about these medicines        Dose / Directions    allopurinol 300 MG tablet   Commonly known as:  ZYLOPRIM        Dose:  300 mg   Take 300 mg by mouth every evening   Refills:  0       ASPIRIN ADULT LOW STRENGTH 81 MG EC tablet   Generic drug:  aspirin        Dose:  81 mg   Take 81 mg by mouth every morning   Refills:  0       " atenolol-chlorthalidone 100-25 MG per tablet   Commonly known as:  TENORETIC        Dose:  1 tablet   Take 1 tablet by mouth every evening   Refills:  0       CRESTOR 40 MG tablet   Generic drug:  rosuvastatin        Dose:  40 mg   Take 40 mg by mouth every evening   Refills:  0       glipiZIDE 5 MG tablet   Commonly known as:  GLUCOTROL        Dose:  5 mg   Take 5 mg by mouth every evening   Refills:  0       lisinopril 10 MG tablet   Commonly known as:  PRINIVIL/ZESTRIL        Dose:  10 mg   Take 10 mg by mouth every evening   Refills:  0       metFORMIN 1000 MG tablet   Commonly known as:  GLUCOPHAGE        Dose:  1000 mg   Take 1,000 mg by mouth daily (with dinner)   Refills:  0       omeprazole 20 MG CR capsule   Commonly known as:  priLOSEC        Dose:  20 mg   Take 20 mg by mouth every evening   Refills:  0       venlafaxine 75 MG Tb24 24 hr tablet   Commonly known as:  EFFEXOR-ER        Dose:  75 mg   Take 75 mg by mouth every morning   Refills:  0       zolpidem 5 MG tablet   Commonly known as:  AMBIEN        Dose:  5 mg   Take 5 mg by mouth nightly as needed for sleep   Refills:  0                Protect others around you: Learn how to safely use, store and throw away your medicines at www.disposemymeds.org.             Medication List: This is a list of all your medications and when to take them. Check marks below indicate your daily home schedule. Keep this list as a reference.      Medications           Morning Afternoon Evening Bedtime As Needed    allopurinol 300 MG tablet   Commonly known as:  ZYLOPRIM   Take 300 mg by mouth every evening                                ASPIRIN ADULT LOW STRENGTH 81 MG EC tablet   Take 81 mg by mouth every morning   Generic drug:  aspirin                                atenolol-chlorthalidone 100-25 MG per tablet   Commonly known as:  TENORETIC   Take 1 tablet by mouth every evening                                CRESTOR 40 MG tablet   Take 40 mg by mouth every  evening   Generic drug:  rosuvastatin                                glipiZIDE 5 MG tablet   Commonly known as:  GLUCOTROL   Take 5 mg by mouth every evening                                lisinopril 10 MG tablet   Commonly known as:  PRINIVIL/ZESTRIL   Take 10 mg by mouth every evening                                metFORMIN 1000 MG tablet   Commonly known as:  GLUCOPHAGE   Take 1,000 mg by mouth daily (with dinner)                                omeprazole 20 MG CR capsule   Commonly known as:  priLOSEC   Take 20 mg by mouth every evening                                venlafaxine 75 MG Tb24 24 hr tablet   Commonly known as:  EFFEXOR-ER   Take 75 mg by mouth every morning                                zolpidem 5 MG tablet   Commonly known as:  AMBIEN   Take 5 mg by mouth nightly as needed for sleep

## 2018-10-22 NOTE — DISCHARGE INSTRUCTIONS
DR. LEONARDO VERMA M.D.      [facility] PHONE NUMBER:  [phone]    GENERAL ANESTHESIA OR SEDATION ADULT DISCHARGE INSTRUCTIONS   SPECIAL PRECAUTIONS FOR 24 HOURS AFTER SURGERY    IT IS NOT UNUSUAL TO FEEL LIGHT-HEADED OR FAINT, UP TO 24 HOURS AFTER SURGERY OR WHILE TAKING PAIN MEDICATION.  IF YOU HAVE THESE SYMPTOMS; SIT FOR A FEW MINUTES BEFORE STANDING AND HAVE SOMEONE ASSIST YOU WHEN YOU GET UP TO WALK OR USE THE BATHROOM.    YOU SHOULD REST AND RELAX FOR THE NEXT 24 HOURS AND YOU MUST MAKE ARRANGEMENTS TO HAVE SOMEONE STAY WITH YOU FOR AT LEAST 24 HOURS AFTER YOUR DISCHARGE.  AVOID HAZARDOUS AND STRENUOUS ACTIVITIES.  DO NOT MAKE IMPORTANT DECISIONS FOR 24 HOURS.    DO NOT DRIVE ANY VEHICLE OR OPERATE MECHANICAL EQUIPMENT FOR 24 HOURS FOLLOWING THE END OF YOUR SURGERY.  EVEN THOUGH YOU MAY FEEL NORMAL, YOUR REACTIONS MAY BE AFFECTED BY THE MEDICATION YOU HAVE RECEIVED.    DO NOT DRINK ALCOHOLIC BEVERAGES FOR 24 HOURS FOLLOWING YOUR SURGERY.    DRINK CLEAR LIQUIDS (APPLE JUICE, GINGER ALE, 7-UP, BROTH, ETC.).  PROGRESS TO YOUR REGULAR DIET AS YOU FEEL ABLE.    YOU MAY HAVE A DRY MOUTH, A SORE THROAT, MUSCLES ACHES OR TROUBLE SLEEPING.  THESE SHOULD GO AWAY AFTER 24 HOURS.    CALL YOUR DOCTOR FOR ANY OF THE FOLLOWING:  SIGNS OF INFECTION (FEVER, GROWING TENDERNESS AT THE SURGERY SITE, A LARGE AMOUNT OF DRAINAGE OR BLEEDING, SEVERE PAIN, FOUL-SMELLING DRAINAGE, REDNESS OR SWELLING.    IT HAS BEEN OVER 8 TO 10 HOURS SINCE SURGERY AND YOU ARE STILL NOT ABLE TO URINATE (PASS WATER).

## 2018-10-22 NOTE — IP AVS SNAPSHOT
[facility] PreOP/PostOP    [address]    [facility] [address]    Phone:  [phone]    Fax:  [phone]                                       After Visit Summary   10/22/2018    Ramon Winter    MRN: [id number]           After Visit Summary Signature Page     I have received my discharge instructions, and my questions have been answered. I have discussed any challenges I see with this plan with the nurse or doctor.    ..........................................................................................................................................  Patient/Patient Representative Signature      ..........................................................................................................................................  Patient Representative Print Name and Relationship to Patient    ..................................................               ................................................  Date                                   Time    ..........................................................................................................................................  Reviewed by Signature/Title    ...................................................              ..............................................  Date                                               Time          22EPIC Rev 08/18

## 2018-10-22 NOTE — ANESTHESIA CARE TRANSFER NOTE
Patient: Ramon Winter    Procedure(s):  ENDOSCOPIC ULTRASOUND, ESOPHAGOSCOPY, GASTROSCOPY, DUODENOSCOPY (EGD), Fine needle aspiration    Diagnosis: GE junction mass  Diagnosis Additional Information: No value filed.    Anesthesia Type:   MAC     Note:  Airway :Room Air  Patient transferred to:Phase II  Comments: VSS, report to RN.Handoff Report: Identifed the Patient, Identified the Reponsible Provider, Reviewed the pertinent medical history, Discussed the surgical course, Reviewed Intra-OP anesthesia mangement and issues during anesthesia, Set expectations for post-procedure period and Allowed opportunity for questions and acknowledgement of understanding      Vitals: (Last set prior to Anesthesia Care Transfer)    CRNA VITALS  10/22/2018 1325 - 10/22/2018 1402      10/22/2018             NIBP: 113/63    NIBP Mean: 74                Electronically Signed By: JINNY Lance CRNA  October 22, 2018  2:02 PM

## 2018-10-22 NOTE — ANESTHESIA PREPROCEDURE EVALUATION
PAC NOTE:       ANESTHESIA PRE EVALUATION:  Anesthesia Evaluation     . Pt has had prior anesthetic. Type: General    No history of anesthetic complications          ROS/MED HX    ENT/Pulmonary:  - neg pulmonary ROS     Neurologic:  - neg neurologic ROS     Cardiovascular:     (+) Dyslipidemia, hypertension----. : . . . :. .       METS/Exercise Tolerance:     Hematologic:  - neg hematologic  ROS       Musculoskeletal:  - neg musculoskeletal ROS       GI/Hepatic:     (+) GERD Asymptomatic on medication,       Renal/Genitourinary:  - ROS Renal section negative   (+) chronic renal disease,       Endo:     (+) type II DM .      Psychiatric:  - neg psychiatric ROS       Infectious Disease:  - neg infectious disease ROS       Malignancy:      - no malignancy   Other:    - neg other ROS                 Physical Exam  Normal systems: cardiovascular, pulmonary and dental    Airway   Mallampati: II  TM distance: >3 FB  Neck ROM: full    Dental     Cardiovascular       Pulmonary              Anesthesia Plan      History & Physical Review  History and physical reviewed and following examination; no interval change.    ASA Status:  3 .        Plan for MAC   PONV prophylaxis:  Ondansetron (or other 5HT-3)       Postoperative Care  Postoperative pain management:  IV analgesics and Oral pain medications.      Consents  Anesthetic plan, risks, benefits and alternatives discussed with:  Patient or representative and Patient..                            .

## 2018-10-22 NOTE — ANESTHESIA POSTPROCEDURE EVALUATION
Patient: Ramon Winter    Procedure(s):  ENDOSCOPIC ULTRASOUND, ESOPHAGOSCOPY, GASTROSCOPY, DUODENOSCOPY (EGD), Fine needle aspiration    Diagnosis:GE junction mass  Diagnosis Additional Information: - An esophago-gastric anastomosis was found, characterized by healthy appearing mucosa.   - A mass measuring 16 mm by 20 mm was identified endosonographically in the mediastinum. Cytology results are pending. However, the endosonographic  appearance , is consistent with recurrent esophageal adenocarcinoma. Fine needle aspiration performed.     Anesthesia Type:  MAC    Note:  Anesthesia Post Evaluation    Patient location during evaluation: Phase 2  Patient participation: Able to fully participate in evaluation  Level of consciousness: awake and alert  Pain management: adequate  Airway patency: patent  Cardiovascular status: acceptable  Respiratory status: acceptable  Hydration status: acceptable  PONV: none     Anesthetic complications: None          Last vitals:  Vitals:    10/22/18 1410 10/22/18 1415 10/22/18 1455   BP: 112/74 108/73    Resp:  16 16   Temp:   97  F (36.1  C)   SpO2:  97% 98%         Electronically Signed By: Jeffery Knight MD  October 22, 2018  3:15 PM

## 2018-10-24 LAB — COPATH REPORT: NORMAL

## 2018-12-05 NOTE — PROGRESS NOTES
Dialysis Clinic:   Procedure: port placement esophageal cancer  Arrival date: 12/10/18  Arrival time: 0830  NPO explained: yes                 Does patient have transportation available pre and post procedure?   yes  Check-in procedure explained: yes  Allergies reviewed: yes  Blood thinners: no  H&P:  Requested 12/5/18 from [facility]  Does patient require ?    no  If so, language?      services called to order ?    date requested:    arrival time requested:    Name of individual from  services assisting with scheduling appointment:

## 2018-12-10 ENCOUNTER — APPOINTMENT (OUTPATIENT)
Dept: INTERVENTIONAL RADIOLOGY/VASCULAR | Facility: CLINIC | Age: 73
End: 2018-12-10
Attending: INTERNAL MEDICINE
Payer: MEDICARE

## 2018-12-10 ENCOUNTER — HOSPITAL ENCOUNTER (OUTPATIENT)
Facility: CLINIC | Age: 73
Discharge: HOME | End: 2018-12-10
Attending: RADIOLOGY | Admitting: RADIOLOGY
Payer: MEDICARE

## 2018-12-10 VITALS
HEART RATE: 61 BPM | WEIGHT: 202.82 LBS | OXYGEN SATURATION: 97 % | RESPIRATION RATE: 13 BRPM | HEIGHT: 69 IN | TEMPERATURE: 97.4 F | SYSTOLIC BLOOD PRESSURE: 121 MMHG | DIASTOLIC BLOOD PRESSURE: 75 MMHG | BODY MASS INDEX: 30.04 KG/M2

## 2018-12-10 DIAGNOSIS — C80.1 CANCER (H): ICD-10-CM

## 2018-12-10 LAB
BASOPHILS # BLD AUTO: 0 10E9/L (ref 0–0.2)
BASOPHILS NFR BLD AUTO: 0.7 %
DIFFERENTIAL METHOD BLD: ABNORMAL
EOSINOPHIL # BLD AUTO: 0.2 10E9/L (ref 0–0.7)
EOSINOPHIL NFR BLD AUTO: 4.5 %
ERYTHROCYTE [DISTWIDTH] IN BLOOD BY AUTOMATED COUNT: 17.3 % (ref 10–15)
HCT VFR BLD AUTO: 36.5 % (ref 40–53)
HGB BLD-MCNC: 12.4 G/DL (ref 13.3–17.7)
IMM GRANULOCYTES # BLD: 0 10E9/L (ref 0–0.4)
IMM GRANULOCYTES NFR BLD: 0.4 %
INR PPP: 0.92 (ref 0.86–1.14)
LYMPHOCYTES # BLD AUTO: 0.6 10E9/L (ref 0.8–5.3)
LYMPHOCYTES NFR BLD AUTO: 10.7 %
MCH RBC QN AUTO: 34.9 PG (ref 26.5–33)
MCHC RBC AUTO-ENTMCNC: 34 G/DL (ref 31.5–36.5)
MCV RBC AUTO: 103 FL (ref 78–100)
MONOCYTES # BLD AUTO: 0.6 10E9/L (ref 0–1.3)
MONOCYTES NFR BLD AUTO: 10.3 %
NEUTROPHILS # BLD AUTO: 3.9 10E9/L (ref 1.6–8.3)
NEUTROPHILS NFR BLD AUTO: 73.4 %
NRBC # BLD AUTO: 0 10*3/UL
NRBC BLD AUTO-RTO: 0 /100
PLATELET # BLD AUTO: 225 10E9/L (ref 150–450)
RBC # BLD AUTO: 3.55 10E12/L (ref 4.4–5.9)
WBC # BLD AUTO: 5.3 10E9/L (ref 4–11)

## 2018-12-10 PROCEDURE — 85025 COMPLETE CBC W/AUTO DIFF WBC: CPT | Performed by: RADIOLOGY

## 2018-12-10 PROCEDURE — 25000128 H RX IP 250 OP 636: Performed by: RADIOLOGY

## 2018-12-10 PROCEDURE — 27210820 ZZH WOUND GLUE CR3

## 2018-12-10 PROCEDURE — 27210908 ZZH NEEDLE CR4

## 2018-12-10 PROCEDURE — 85027 COMPLETE CBC AUTOMATED: CPT | Performed by: RADIOLOGY

## 2018-12-10 PROCEDURE — 25000125 ZZHC RX 250: Performed by: RADIOLOGY

## 2018-12-10 PROCEDURE — 27210904 ZZH KIT CR6

## 2018-12-10 PROCEDURE — 36561 INSERT TUNNELED CV CATH: CPT

## 2018-12-10 PROCEDURE — 76937 US GUIDE VASCULAR ACCESS: CPT

## 2018-12-10 PROCEDURE — 99152 MOD SED SAME PHYS/QHP 5/>YRS: CPT

## 2018-12-10 PROCEDURE — 25000128 H RX IP 250 OP 636

## 2018-12-10 PROCEDURE — 85610 PROTHROMBIN TIME: CPT | Performed by: RADIOLOGY

## 2018-12-10 PROCEDURE — 77001 FLUOROGUIDE FOR VEIN DEVICE: CPT

## 2018-12-10 PROCEDURE — C1788 PORT, INDWELLING, IMP: HCPCS

## 2018-12-10 RX ORDER — LIDOCAINE HYDROCHLORIDE AND EPINEPHRINE 10; 10 MG/ML; UG/ML
INJECTION, SOLUTION INFILTRATION; PERINEURAL
Status: DISCONTINUED
Start: 2018-12-10 | End: 2018-12-10 | Stop reason: HOSPADM

## 2018-12-10 RX ORDER — HEPARIN SODIUM 1000 [USP'U]/ML
INJECTION, SOLUTION INTRAVENOUS; SUBCUTANEOUS
Status: DISCONTINUED
Start: 2018-12-10 | End: 2018-12-10 | Stop reason: HOSPADM

## 2018-12-10 RX ORDER — NALOXONE HYDROCHLORIDE 0.4 MG/ML
.1-.4 INJECTION, SOLUTION INTRAMUSCULAR; INTRAVENOUS; SUBCUTANEOUS
Status: DISCONTINUED | OUTPATIENT
Start: 2018-12-10 | End: 2018-12-10 | Stop reason: HOSPADM

## 2018-12-10 RX ORDER — LIDOCAINE HYDROCHLORIDE 10 MG/ML
INJECTION, SOLUTION EPIDURAL; INFILTRATION; INTRACAUDAL; PERINEURAL
Status: DISCONTINUED
Start: 2018-12-10 | End: 2018-12-10 | Stop reason: HOSPADM

## 2018-12-10 RX ORDER — LIDOCAINE HYDROCHLORIDE 10 MG/ML
1-30 INJECTION, SOLUTION EPIDURAL; INFILTRATION; INTRACAUDAL; PERINEURAL
Status: DISCONTINUED | OUTPATIENT
Start: 2018-12-10 | End: 2018-12-10 | Stop reason: HOSPADM

## 2018-12-10 RX ORDER — FENTANYL CITRATE 50 UG/ML
INJECTION, SOLUTION INTRAMUSCULAR; INTRAVENOUS
Status: DISCONTINUED
Start: 2018-12-10 | End: 2018-12-10 | Stop reason: HOSPADM

## 2018-12-10 RX ORDER — LIDOCAINE 40 MG/G
CREAM TOPICAL
Status: DISCONTINUED | OUTPATIENT
Start: 2018-12-10 | End: 2018-12-10 | Stop reason: HOSPADM

## 2018-12-10 RX ORDER — DEXTROSE MONOHYDRATE 25 G/50ML
25-50 INJECTION, SOLUTION INTRAVENOUS
Status: DISCONTINUED | OUTPATIENT
Start: 2018-12-10 | End: 2018-12-10 | Stop reason: HOSPADM

## 2018-12-10 RX ORDER — FLUMAZENIL 0.1 MG/ML
0.2 INJECTION, SOLUTION INTRAVENOUS
Status: DISCONTINUED | OUTPATIENT
Start: 2018-12-10 | End: 2018-12-10 | Stop reason: HOSPADM

## 2018-12-10 RX ORDER — NICOTINE POLACRILEX 4 MG
15-30 LOZENGE BUCCAL
Status: DISCONTINUED | OUTPATIENT
Start: 2018-12-10 | End: 2018-12-10 | Stop reason: HOSPADM

## 2018-12-10 RX ORDER — FENTANYL CITRATE 50 UG/ML
25-50 INJECTION, SOLUTION INTRAMUSCULAR; INTRAVENOUS EVERY 5 MIN PRN
Status: DISCONTINUED | OUTPATIENT
Start: 2018-12-10 | End: 2018-12-10 | Stop reason: HOSPADM

## 2018-12-10 RX ORDER — CEFAZOLIN SODIUM 2 G/100ML
INJECTION, SOLUTION INTRAVENOUS
Status: COMPLETED
Start: 2018-12-10 | End: 2018-12-10

## 2018-12-10 RX ORDER — CEFAZOLIN SODIUM 2 G/100ML
2 INJECTION, SOLUTION INTRAVENOUS
Status: DISCONTINUED | OUTPATIENT
Start: 2018-12-10 | End: 2018-12-10 | Stop reason: HOSPADM

## 2018-12-10 RX ORDER — SODIUM CHLORIDE 9 MG/ML
INJECTION, SOLUTION INTRAVENOUS CONTINUOUS
Status: DISCONTINUED | OUTPATIENT
Start: 2018-12-10 | End: 2018-12-10 | Stop reason: HOSPADM

## 2018-12-10 RX ORDER — LIDOCAINE HYDROCHLORIDE 10 MG/ML
1-30 INJECTION, SOLUTION EPIDURAL; INFILTRATION; INTRACAUDAL; PERINEURAL
Status: COMPLETED | OUTPATIENT
Start: 2018-12-10 | End: 2018-12-10

## 2018-12-10 RX ORDER — LIDOCAINE HYDROCHLORIDE AND EPINEPHRINE 10; 10 MG/ML; UG/ML
1-30 INJECTION, SOLUTION INFILTRATION; PERINEURAL
Status: COMPLETED | OUTPATIENT
Start: 2018-12-10 | End: 2018-12-10

## 2018-12-10 RX ADMIN — LIDOCAINE HYDROCHLORIDE AND EPINEPHRINE 10 ML: 10; 10 INJECTION, SOLUTION INFILTRATION; PERINEURAL at 09:57

## 2018-12-10 RX ADMIN — LIDOCAINE HYDROCHLORIDE 10 ML: 10 INJECTION, SOLUTION EPIDURAL; INFILTRATION; INTRACAUDAL; PERINEURAL at 09:57

## 2018-12-10 RX ADMIN — FENTANYL CITRATE 50 MCG: 50 INJECTION INTRAMUSCULAR; INTRAVENOUS at 09:56

## 2018-12-10 RX ADMIN — MIDAZOLAM HYDROCHLORIDE 1 MG: 2 INJECTION, SOLUTION INTRAMUSCULAR; INTRAVENOUS at 09:56

## 2018-12-10 RX ADMIN — CEFAZOLIN SODIUM 2 G: 2 INJECTION, SOLUTION INTRAVENOUS at 10:00

## 2018-12-10 ASSESSMENT — MIFFLIN-ST. JEOR: SCORE: 1655.38

## 2018-12-10 NOTE — IP AVS SNAPSHOT
MRN:[id number]                      After Visit Summary   12/10/2018    Ramon Winter    MRN: [id number]           Visit Information        Department      12/10/2018  8:13 AM [facility]          Review of your medicines      UNREVIEWED medicines. Ask your doctor about these medicines       Dose / Directions   allopurinol 300 MG tablet  Commonly known as:  ZYLOPRIM      Dose:  300 mg  Take 300 mg by mouth every evening  Refills:  0     ASPIRIN ADULT LOW STRENGTH 81 MG EC tablet  Generic drug:  aspirin      Dose:  81 mg  Take 81 mg by mouth every morning  Refills:  0     atenolol-chlorthalidone 100-25 MG tablet  Commonly known as:  TENORETIC      Dose:  1 tablet  Take 1 tablet by mouth every evening  Refills:  0     CRESTOR 40 MG tablet  Generic drug:  rosuvastatin      Dose:  40 mg  Take 40 mg by mouth every evening  Refills:  0     glipiZIDE 5 MG tablet  Commonly known as:  GLUCOTROL      Dose:  5 mg  Take 5 mg by mouth every evening  Refills:  0     lisinopril 10 MG tablet  Commonly known as:  PRINIVIL/ZESTRIL      Dose:  10 mg  Take 10 mg by mouth every evening  Refills:  0     metFORMIN 1000 MG tablet  Commonly known as:  GLUCOPHAGE      Dose:  1000 mg  Take 1,000 mg by mouth daily (with dinner)  Refills:  0     omeprazole 20 MG DR capsule  Commonly known as:  priLOSEC      Dose:  20 mg  Take 20 mg by mouth every evening  Refills:  0     venlafaxine 75 MG 24 hr tablet  Commonly known as:  EFFEXOR-ER      Dose:  75 mg  Take 75 mg by mouth every morning  Refills:  0     zolpidem 5 MG tablet  Commonly known as:  AMBIEN      Dose:  5 mg  Take 5 mg by mouth nightly as needed for sleep  Refills:  0              Protect others around you: Learn how to safely use, store and throw away your medicines at www.disposemymeds.org.       Follow-ups after your visit       Care Instructions       Further instructions from your care team         Going Home after Your Port Is  Inserted  _______________________________________    Patient Name: Ramon Winter  Today's Date: December 10, 2018    The doctor who inserted your port was:  Dr. Martinez at [facility]    Have your port site and/or neck wound checked on:  Next clinic visit    Your port may be accessed right away. A nurse needs to flush your port every 30 days or after each use.     When you get home:    You should have an adult with you for the first 6 hours.    No driving or drinking alcohol until tomorrow. You may still have side effects from the medicine you received today (you may feel drowsy, unsteady or forgetful).     You may go back to your regular diet today.     If you take aspirin or Plavix, you may begin taking it again tomorrow. You may restart all other medicines today. Use pain medicine as directed.    Avoid heavy lifting or the overuse of your shoulder for three days.    Caring for the port site and/or neck wound:    Keep the port site clean and dry. Cover the site with plastic before taking a shower. Do this until the site has healed.     Keep the bandage on your port site for three days. Change it if it gets wet or dirty. After three days, you may use Band-Aids until the wound has healed.    For a neck wound, leave the tape on for three days. You may cover it with a Band-Aid for comfort.     If you have oozing or bleeding from the port site or from the cut in your neck:     Put direct pressure on the wound for 5 to 10 minutes with a gauze pad.  If you still have bleeding after 10 minutes, call your doctor.    If you are bleeding a lot and can't control it with direct pressure, call 911.    Call your doctor at  Oncology clinic if you have:    Bleeding from a wound after 10 minutes of direct pressure.    Swelling in your neck or over your port site.    Signs of infection: a fever over 100 F (37.8 C) under the tongue; the site is red, tender or draining.      Additional Information About Your Visit      "  MyChart Information    [employer] lets you send messages to your doctor, view your test results, renew your prescriptions, schedule appointments and more. To sign up, go to [email or web address] . Click on \"Log in\" on the left side of the screen, which will take you to the Welcome page. Then click on \"Sign up Now\" on the right side of the page.     You will be asked to enter the access code listed below, as well as some personal information. Please follow the directions to create your username and password.     Your access code is: [email or web address]  Expires: 2018  1:35 PM     Your access code will  in 90 days. If you need help or a new code, please call your [place] clinic or [phone].       Care EveryWhere ID    This is your Care EveryWhere ID. This could be used by other organizations to access your [place] medical records  [id number]       Your Vitals Were     Blood Pressure   137/86 (BP Location: Right arm)          Pulse   59          Temperature   97.4  F (36.3  C) (Temporal)          Respirations   14          Height   1.753 m (5' 9\")             Weight   92 kg (202 lb 13.2 oz)    Pulse Oximetry   97%    BMI (Body Mass Index)   29.95 kg/m           Primary Care Provider Office Phone # Fax #    Polo Rudolph [phone]      Equal Access to Services    [facility]: Hadii aad ku hadasho Soomaali, waaxda luqadaha, qaybta kaalmada adeegyada, kim marroquin . So [facility] [phone].    ATENCIÓN: Si habla español, tiene a rashid disposición servicios gratuitos de asistencia lingüística. Llame al [phone].    We comply with applicable federal civil rights laws and [place] laws. We do not discriminate on the basis of race, color, national origin, age, disability, sex, sexual orientation, or gender identity.           Thank you!    Thank you for choosing [facility] for your care. Our goal is always to provide you with excellent care. " Hearing back from our patients is one way we can continue to improve our services. Please take a few minutes to complete the written survey that you may receive in the mail after you visit. If you would like to speak to someone directly about your visit please contact Patient Relations at [phone]. Thank you!              Medication List: This is a list of all your medications and when to take them. Check marks below indicate your daily home schedule. Keep this list as a reference.      Medications       Morning Afternoon Evening Bedtime As Needed   allopurinol 300 MG tablet  Commonly known as:  ZYLOPRIM  Take 300 mg by mouth every evening                       ASPIRIN ADULT LOW STRENGTH 81 MG EC tablet  Take 81 mg by mouth every morning  Generic drug:  aspirin                       atenolol-chlorthalidone 100-25 MG tablet  Commonly known as:  TENORETIC  Take 1 tablet by mouth every evening                       CRESTOR 40 MG tablet  Take 40 mg by mouth every evening  Generic drug:  rosuvastatin                       glipiZIDE 5 MG tablet  Commonly known as:  GLUCOTROL  Take 5 mg by mouth every evening                       lisinopril 10 MG tablet  Commonly known as:  PRINIVIL/ZESTRIL  Take 10 mg by mouth every evening                       metFORMIN 1000 MG tablet  Commonly known as:  GLUCOPHAGE  Take 1,000 mg by mouth daily (with dinner)                       omeprazole 20 MG DR capsule  Commonly known as:  priLOSEC  Take 20 mg by mouth every evening                       venlafaxine 75 MG 24 hr tablet  Commonly known as:  EFFEXOR-ER  Take 75 mg by mouth every morning                       zolpidem 5 MG tablet  Commonly known as:  AMBIEN  Take 5 mg by mouth nightly as needed for sleep

## 2018-12-10 NOTE — IP AVS SNAPSHOT
[facility]– [place]note 2[place]  [address]  [facility] [address]  Phone:  [phone]                                    After Visit Summary   12/10/2018    Ramon Winter    MRN: [id number]           After Visit Summary Signature Page    I have received my discharge instructions, and my questions have been answered. I have discussed any challenges I see with this plan with the nurse or doctor.    ..........................................................................................................................................  Patient/Patient Representative Signature      ..........................................................................................................................................  Patient Representative Print Name and Relationship to Patient    ..................................................               ................................................  Date                                   Time    ..........................................................................................................................................  Reviewed by Signature/Title    ...................................................              ..............................................  Date                                               Time          22EPIC Rev 08/18

## 2018-12-10 NOTE — DISCHARGE INSTRUCTIONS
Going Home after Your Port Is Inserted  _______________________________________    Patient Name: Ramon Winter  Today's Date: December 10, 2018    The doctor who inserted your port was:  Dr. Martinez at [facility]    Have your port site and/or neck wound checked on:  Next clinic visit    Your port may be accessed right away. A nurse needs to flush your port every 30 days or after each use.     When you get home:    You should have an adult with you for the first 6 hours.    No driving or drinking alcohol until tomorrow. You may still have side effects from the medicine you received today (you may feel drowsy, unsteady or forgetful).     You may go back to your regular diet today.     If you take aspirin or Plavix, you may begin taking it again tomorrow. You may restart all other medicines today. Use pain medicine as directed.    Avoid heavy lifting or the overuse of your shoulder for three days.    Caring for the port site and/or neck wound:    Keep the port site clean and dry. Cover the site with plastic before taking a shower. Do this until the site has healed.     Keep the bandage on your port site for three days. Change it if it gets wet or dirty. After three days, you may use Band-Aids until the wound has healed.    For a neck wound, leave the tape on for three days. You may cover it with a Band-Aid for comfort.     If you have oozing or bleeding from the port site or from the cut in your neck:     Put direct pressure on the wound for 5 to 10 minutes with a gauze pad.  If you still have bleeding after 10 minutes, call your doctor.    If you are bleeding a lot and can't control it with direct pressure, call 911.    Call your doctor at  Oncology clinic if you have:    Bleeding from a wound after 10 minutes of direct pressure.    Swelling in your neck or over your port site.    Signs of infection: a fever over 100 F (37.8 C) under the tongue; the site is red, tender or draining.

## 2019-01-02 ENCOUNTER — HOSPITAL ENCOUNTER (OUTPATIENT)
Dept: CARDIOLOGY | Facility: CLINIC | Age: 74
Discharge: HOME | End: 2019-01-02
Attending: INTERNAL MEDICINE | Admitting: INTERNAL MEDICINE
Payer: COMMERCIAL

## 2019-01-02 DIAGNOSIS — C15.9 ESOPHAGEAL CANCER (H): ICD-10-CM

## 2019-01-02 DIAGNOSIS — Z01.818 EXAMINATION PRIOR TO CHEMOTHERAPY: ICD-10-CM

## 2019-01-02 PROCEDURE — 93306 TTE W/DOPPLER COMPLETE: CPT

## 2019-01-02 PROCEDURE — 93306 TTE W/DOPPLER COMPLETE: CPT | Mod: 26 | Performed by: INTERNAL MEDICINE

## 2019-03-06 ENCOUNTER — HOSPITAL ENCOUNTER (OUTPATIENT)
Dept: CT IMAGING | Facility: CLINIC | Age: 74
Discharge: HOME | End: 2019-03-06
Attending: INTERNAL MEDICINE | Admitting: INTERNAL MEDICINE
Payer: COMMERCIAL

## 2019-03-06 DIAGNOSIS — C15.9 ESOPHAGEAL CANCER (H): ICD-10-CM

## 2019-03-06 LAB
CREAT BLD-MCNC: 1.6 MG/DL (ref 0.66–1.25)
GFR SERPL CREATININE-BSD FRML MDRD: 43 ML/MIN/{1.73_M2}

## 2019-03-06 PROCEDURE — 71260 CT THORAX DX C+: CPT

## 2019-03-06 PROCEDURE — 25000128 H RX IP 250 OP 636

## 2019-03-06 PROCEDURE — 25000128 H RX IP 250 OP 636: Performed by: INTERNAL MEDICINE

## 2019-03-06 PROCEDURE — 74177 CT ABD & PELVIS W/CONTRAST: CPT

## 2019-03-06 PROCEDURE — 82565 ASSAY OF CREATININE: CPT

## 2019-03-06 RX ORDER — HEPARIN SODIUM (PORCINE) LOCK FLUSH IV SOLN 100 UNIT/ML 100 UNIT/ML
5 SOLUTION INTRAVENOUS ONCE
Status: COMPLETED | OUTPATIENT
Start: 2019-03-06 | End: 2019-03-06

## 2019-03-06 RX ORDER — IOPAMIDOL 755 MG/ML
99 INJECTION, SOLUTION INTRAVASCULAR ONCE
Status: COMPLETED | OUTPATIENT
Start: 2019-03-06 | End: 2019-03-06

## 2019-03-06 RX ORDER — HEPARIN SODIUM (PORCINE) LOCK FLUSH IV SOLN 100 UNIT/ML 100 UNIT/ML
SOLUTION INTRAVENOUS
Status: COMPLETED
Start: 2019-03-06 | End: 2019-03-06

## 2019-03-06 RX ADMIN — HEPARIN SODIUM (PORCINE) LOCK FLUSH IV SOLN 100 UNIT/ML 5 ML: 100 SOLUTION at 12:10

## 2019-03-06 RX ADMIN — IOPAMIDOL 99 ML: 755 INJECTION, SOLUTION INTRAVENOUS at 11:56

## 2019-03-06 RX ADMIN — SODIUM CHLORIDE, PRESERVATIVE FREE 5 ML: 5 INJECTION INTRAVENOUS at 12:10

## 2019-03-06 RX ADMIN — SODIUM CHLORIDE 65 ML: 9 INJECTION, SOLUTION INTRAVENOUS at 12:03

## 2019-05-01 ENCOUNTER — HOSPITAL ENCOUNTER (OUTPATIENT)
Dept: CT IMAGING | Facility: CLINIC | Age: 74
Discharge: HOME | End: 2019-05-01
Attending: INTERNAL MEDICINE | Admitting: INTERNAL MEDICINE
Payer: COMMERCIAL

## 2019-05-01 DIAGNOSIS — C15.9 MALIGNANT NEOPLASM OF ESOPHAGUS, UNSPECIFIED LOCATION (H): ICD-10-CM

## 2019-05-01 DIAGNOSIS — C15.5 MALIGNANT NEOPLASM OF ABDOMINAL ESOPHAGUS (H): ICD-10-CM

## 2019-05-01 PROCEDURE — 74176 CT ABD & PELVIS W/O CONTRAST: CPT

## 2019-05-22 ENCOUNTER — HOSPITAL ENCOUNTER (OUTPATIENT)
Dept: CARDIOLOGY | Facility: CLINIC | Age: 74
Discharge: HOME | End: 2019-05-22
Attending: INTERNAL MEDICINE | Admitting: INTERNAL MEDICINE
Payer: COMMERCIAL

## 2019-05-22 DIAGNOSIS — C15.9 ESOPHAGEAL CANCER (H): ICD-10-CM

## 2019-05-22 DIAGNOSIS — Z51.11 CHEMOTHERAPY MANAGEMENT, ENCOUNTER FOR: ICD-10-CM

## 2019-05-22 PROCEDURE — 93325 DOPPLER ECHO COLOR FLOW MAPG: CPT | Mod: 26 | Performed by: INTERNAL MEDICINE

## 2019-05-22 PROCEDURE — 93308 TTE F-UP OR LMTD: CPT

## 2019-05-22 PROCEDURE — 93321 DOPPLER ECHO F-UP/LMTD STD: CPT | Mod: 26 | Performed by: INTERNAL MEDICINE

## 2019-05-22 PROCEDURE — 93308 TTE F-UP OR LMTD: CPT | Mod: 26 | Performed by: INTERNAL MEDICINE

## 2019-07-01 ENCOUNTER — TRANSFERRED RECORDS (OUTPATIENT)
Dept: HEALTH INFORMATION MANAGEMENT | Facility: CLINIC | Age: 74
End: 2019-07-01

## 2019-07-11 ENCOUNTER — MEDICAL CORRESPONDENCE (OUTPATIENT)
Dept: HEALTH INFORMATION MANAGEMENT | Facility: CLINIC | Age: 74
End: 2019-07-11

## 2019-07-12 ENCOUNTER — TRANSFERRED RECORDS (OUTPATIENT)
Dept: HEALTH INFORMATION MANAGEMENT | Facility: CLINIC | Age: 74
End: 2019-07-12

## 2019-07-15 ENCOUNTER — TRANSFERRED RECORDS (OUTPATIENT)
Dept: HEALTH INFORMATION MANAGEMENT | Facility: CLINIC | Age: 74
End: 2019-07-15

## 2019-07-17 NOTE — TELEPHONE ENCOUNTER
RECORDS RECEIVED FROM: Ref by Dr. Flores from [facility], mass of the lateral border of the hamstring muscle Rt side, Dr. Flores's office ok'd the appt date.  All records to be faxed, imaging to be pushed   DATE RECEIVED: Jul 24, 2019    NOTES STATUS DETAILS   OFFICE NOTE from referring provider Received  7/15/19 Dr. Sandra PALMER   OFFICE NOTE from other specialist In process [facility]   DISCHARGE SUMMARY from hospital N/A    DISCHARGE REPORT from the ER N/A    OPERATIVE REPORT N/A    MEDICATION LIST Recevied     IMPLANT RECORD/STICKER N/A    LABS     CBC/DIFF N/A    CULTURES N/A    INJECTIONS DONE IN RADIOLOGY N/A    [facility]    PET In process 7/12/19 [facility]   CT SCAN N/A    XRAYS (IMAGES & REPORTS) Received 7/15/19 [facility]   TUMOR     PATHOLOGY  Slides & report N/A      07/17/19   3:57 PM  Will await records from [facility], or will call Dr. Flores's office    07/18/19   11:04 AM  Received records from [facility]  Faxed records request to [facility], including 7/12/19 PET scan  3:45 PM per fax from [facility], they will require an beverley. Called records. Called patient.

## 2019-07-24 ENCOUNTER — OFFICE VISIT (OUTPATIENT)
Dept: ORTHOPEDICS | Facility: CLINIC | Age: 74
End: 2019-07-24
Payer: COMMERCIAL

## 2019-07-24 ENCOUNTER — PRE VISIT (OUTPATIENT)
Dept: ORTHOPEDICS | Facility: CLINIC | Age: 74
End: 2019-07-24

## 2019-07-24 VITALS — BODY MASS INDEX: 27.58 KG/M2 | WEIGHT: 186.2 LBS | HEIGHT: 69 IN

## 2019-07-24 DIAGNOSIS — R22.41 MASS OF RIGHT THIGH: Primary | ICD-10-CM

## 2019-07-24 ASSESSMENT — MIFFLIN-ST. JEOR: SCORE: 1579.98

## 2019-07-24 NOTE — NURSING NOTE
"Reason For Visit:   Chief Complaint   Patient presents with     Right Thigh - Mass     Mass     Pt presents for a right posterior thigh mass.       Ht 1.753 m (5' 9\")   Wt 84.5 kg (186 lb 3.2 oz)   BMI 27.50 kg/m      Pain Assessment  Patient Currently in Pain: Yes  Patient's Stated Pain Goal: No pain  0-10 Pain Scale: 3  Primary Pain Location: Leg  Pain Descriptors: Aching  Alleviating Factors: Rest  Aggravating Factors: Bending    Sarbjit Orlando, EMT    "
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
## 2019-09-24 ENCOUNTER — DOCUMENTATION ONLY (OUTPATIENT)
Dept: CARE COORDINATION | Facility: CLINIC | Age: 74
End: 2019-09-24

## 2019-10-09 ENCOUNTER — HOSPITAL ENCOUNTER (OUTPATIENT)
Dept: CARDIOLOGY | Facility: CLINIC | Age: 74
Discharge: HOME | End: 2019-10-09
Attending: NURSE PRACTITIONER | Admitting: NURSE PRACTITIONER
Payer: COMMERCIAL

## 2019-10-09 DIAGNOSIS — C15.5 MALIGNANT NEOPLASM OF ABDOMINAL ESOPHAGUS (H): ICD-10-CM

## 2019-10-09 DIAGNOSIS — Z51.11 ENCOUNTER FOR ANTINEOPLASTIC CHEMOTHERAPY: ICD-10-CM

## 2019-10-09 DIAGNOSIS — C16.0 MALIGNANT NEOPLASM OF CARDIA (H): ICD-10-CM

## 2019-10-09 PROCEDURE — 93308 TTE F-UP OR LMTD: CPT | Mod: 26 | Performed by: INTERNAL MEDICINE

## 2019-10-09 PROCEDURE — 93308 TTE F-UP OR LMTD: CPT

## 2019-10-09 PROCEDURE — 93325 DOPPLER ECHO COLOR FLOW MAPG: CPT | Mod: 26 | Performed by: INTERNAL MEDICINE

## 2019-10-09 PROCEDURE — 93321 DOPPLER ECHO F-UP/LMTD STD: CPT | Mod: 26 | Performed by: INTERNAL MEDICINE

## 2019-10-21 ENCOUNTER — HOSPITAL ENCOUNTER (OUTPATIENT)
Dept: CT IMAGING | Facility: CLINIC | Age: 74
Discharge: HOME | End: 2019-10-21
Attending: INTERNAL MEDICINE | Admitting: INTERNAL MEDICINE
Payer: COMMERCIAL

## 2019-10-21 DIAGNOSIS — C15.9 ESOPHAGEAL CANCER (H): ICD-10-CM

## 2019-10-21 LAB
CREAT BLD-MCNC: 1.6 MG/DL (ref 0.66–1.25)
GFR SERPL CREATININE-BSD FRML MDRD: 43 ML/MIN/{1.73_M2}

## 2019-10-21 PROCEDURE — 25000128 H RX IP 250 OP 636: Performed by: INTERNAL MEDICINE

## 2019-10-21 PROCEDURE — 25000125 ZZHC RX 250: Performed by: INTERNAL MEDICINE

## 2019-10-21 PROCEDURE — 71260 CT THORAX DX C+: CPT

## 2019-10-21 PROCEDURE — 74177 CT ABD & PELVIS W/CONTRAST: CPT

## 2019-10-21 PROCEDURE — 82565 ASSAY OF CREATININE: CPT

## 2019-10-21 RX ORDER — IOPAMIDOL 755 MG/ML
500 INJECTION, SOLUTION INTRAVASCULAR ONCE
Status: COMPLETED | OUTPATIENT
Start: 2019-10-21 | End: 2019-10-21

## 2019-10-21 RX ADMIN — IOPAMIDOL 91 ML: 755 INJECTION, SOLUTION INTRAVENOUS at 09:24

## 2019-10-21 RX ADMIN — SODIUM CHLORIDE 63 ML: 9 INJECTION, SOLUTION INTRAVENOUS at 09:24

## 2019-12-30 ENCOUNTER — TRANSFERRED RECORDS (OUTPATIENT)
Dept: HEALTH INFORMATION MANAGEMENT | Facility: CLINIC | Age: 74
End: 2019-12-30

## 2020-01-14 ENCOUNTER — MEDICAL CORRESPONDENCE (OUTPATIENT)
Dept: HEALTH INFORMATION MANAGEMENT | Facility: CLINIC | Age: 75
End: 2020-01-14

## 2020-01-14 ENCOUNTER — HOSPITAL ENCOUNTER (OUTPATIENT)
Dept: CARDIOLOGY | Facility: CLINIC | Age: 75
Discharge: HOME | End: 2020-01-14
Attending: NURSE PRACTITIONER | Admitting: NURSE PRACTITIONER
Payer: COMMERCIAL

## 2020-01-14 DIAGNOSIS — Z51.11 ENCOUNTER FOR ANTINEOPLASTIC CHEMOTHERAPY: ICD-10-CM

## 2020-01-14 DIAGNOSIS — C16.0 PRIMARY MALIGNANT NEOPLASM OF GASTROESOPHAGEAL JUNCTION (H): ICD-10-CM

## 2020-01-14 PROCEDURE — 93306 TTE W/DOPPLER COMPLETE: CPT | Mod: 26 | Performed by: INTERNAL MEDICINE

## 2020-01-14 PROCEDURE — 93306 TTE W/DOPPLER COMPLETE: CPT

## 2020-01-22 ENCOUNTER — OFFICE VISIT (OUTPATIENT)
Dept: CARDIOLOGY | Facility: CLINIC | Age: 75
End: 2020-01-22
Payer: COMMERCIAL

## 2020-01-22 ENCOUNTER — DOCUMENTATION ONLY (OUTPATIENT)
Dept: CARDIOLOGY | Facility: CLINIC | Age: 75
End: 2020-01-22

## 2020-01-22 ENCOUNTER — PRE VISIT (OUTPATIENT)
Dept: CARDIOLOGY | Facility: CLINIC | Age: 75
End: 2020-01-22

## 2020-01-22 VITALS
WEIGHT: 166 LBS | SYSTOLIC BLOOD PRESSURE: 99 MMHG | OXYGEN SATURATION: 97 % | BODY MASS INDEX: 24.59 KG/M2 | DIASTOLIC BLOOD PRESSURE: 67 MMHG | HEART RATE: 89 BPM | HEIGHT: 69 IN

## 2020-01-22 DIAGNOSIS — Z13.6 SCREENING FOR CARDIOVASCULAR CONDITION: Primary | ICD-10-CM

## 2020-01-22 DIAGNOSIS — I42.9 SECONDARY CARDIOMYOPATHY (H): ICD-10-CM

## 2020-01-22 DIAGNOSIS — I10 BENIGN ESSENTIAL HYPERTENSION: ICD-10-CM

## 2020-01-22 PROCEDURE — 93000 ELECTROCARDIOGRAM COMPLETE: CPT | Performed by: INTERNAL MEDICINE

## 2020-01-22 PROCEDURE — 99204 OFFICE O/P NEW MOD 45 MIN: CPT | Performed by: INTERNAL MEDICINE

## 2020-01-22 RX ORDER — ATENOLOL 100 MG/1
100 TABLET ORAL DAILY
Qty: 90 TABLET | Refills: 4 | Status: ON HOLD | OUTPATIENT
Start: 2020-01-22 | End: 2020-02-12

## 2020-01-22 ASSESSMENT — MIFFLIN-ST. JEOR: SCORE: 1483.35

## 2020-01-22 NOTE — PATIENT INSTRUCTIONS
Your blood pressure is a little low today. I am going to change one of your blood pressure medications.    Please stop taking the combination ATENOLOL-HYDROCHLOROTHIAZIDE.    Instead,  the ATENOLOL only prescription and that might help your fatigue.    We will also check your ECHOCARDIOGRAM again in a month, I think it will be fine. I don't think your heart is as weak as they thought on that last one.

## 2020-01-22 NOTE — PROGRESS NOTES
Cardiology Consultation      Ramon Winter MRN# [id number]   YOB: 1945 Age: 74 year old   Date of Visit 01/22/2020     Reason for consult:  Cardiomyopathy           Assessment and Plan:     1. Reduction in LV EF in the setting of Herceptin for progressive esophageal cancer    The ejection fraction may be underestimated.  Patient does not have any decompensated symptoms.  We will discontinue the hydrochlorothiazide portion of his combination pill to potentially reduce ectopy and reduce symptoms of lightheadedness and the drive for tachycardia.      Recheck echocardiogram in 1 month to reassess.    If function remains greater than 45%, would continue current Herceptin and Xeloda unchanged.      2. Trivial pericardial effusion without signs or symptoms of tamponade    Will follow again on future echocardiogram    This note was transcribed using electronic voice recognition software, typographical errors may be present.                Chief Complaint:   New Patient (New Consult Ref per Oncology )           History of Present Illness:   This patient is a very pleasant 74 year old male with progressive esophageal cancer currently on Xeloda and Herceptin.  He does not have exertional chest discomfort and feels exercise improves his intermittent/chronic discomfort.  His most recent echocardiogram in January 2020 suggested reduction in his ejection fraction to 47% compared to previous echocardiogram in October 2019.    I have reviewed the images personally and I feel that ectopy and shadowing may have led to some underestimation of cardiac function.  Patient feels tired but overall no distinct change in his functional status over the past couple months.    He is a little bit lightheaded today and his blood pressure is low.  He is currently on a combination atenolol 100 mg and hydrochlorothiazide 25 mg pill as well as lisinopril 10 mg and maximum dose rosuvastatin.           Physical Exam:     Vitals: BP  "99/67 (BP Location: Right arm, Patient Position: Sitting, Cuff Size: Adult Regular)   Pulse 89   Ht 1.753 m (5' 9\")   Wt 75.3 kg (166 lb)   SpO2 97%   BMI 24.51 kg/m    Constitutional:  cooperative thin      Skin:  warm and dry to the touch        Head:  normocephalic, no masses or lesions        Eyes:  pupils equal and round, conjunctivae and lids unremarkable, sclera white, no xanthalasma, EOMS intact, no nystagmus        ENT:  no pallor or cyanosis, dentition good   hoarse    Neck:  JVP normal        Chest:  normal breath sounds, clear to auscultation, normal A-P diameter, normal symmetry, normal respiratory excursion, no use of accessory muscles        Cardiac:     distant heart sounds              Abdomen:  BS normoactive        Extremities and Back:  no deformities, clubbing, cyanosis, erythema observed        Neurological:  no gross motor deficits                    Past Medical History:   I have reviewed this patient's past medical history  Past Medical History:   Diagnosis Date     Actinic keratosis      Diabetes (H)     dm 2     Elevated C-reactive protein      Esophageal cancer (H)      GERD (gastroesophageal reflux disease)      Gout      Herpes      High cholesterol      Hyperlipidemia      Hypertension      Kidney stone      Low back pain      Osteoarthritis      Osteoarthritis      Other chronic pain     In knees and back     Restless legs syndrome              Past Surgical History:   I have reviewed this patient's past surgical history  Past Surgical History:   Procedure Laterality Date     ENT SURGERY      tonsilectomy     ESOPHAGOGASTRECTOMY N/A 11/7/2017    Procedure: ESOPHAGOGASTRECTOMY;  MARILEE BROOKS ESOPHAGOGASTRECTOMY, PLACEMENT OF FEEDING TUBE JEJUNOSOTMY, PYLOROMYOTOMY, LIGATION THORACIC DUCT;  Surgeon: Maximilian Jeter MD;  Location:  OR     ESOPHAGOSCOPY, GASTROSCOPY, DUODENOSCOPY (EGD), COMBINED N/A 8/2/2017    Procedure: COMBINED ESOPHAGOSCOPY, GASTROSCOPY, DUODENOSCOPY " (EGD), BIOPSY SINGLE OR MULTIPLE;  ESOPHAGOSCOPY, GASTROSCOPY, DUODENOSCOPY (EGD) with biopsy;  Surgeon: Jeffery Keene MD;  Location: [facility]     ESOPHAGOSCOPY, GASTROSCOPY, DUODENOSCOPY (EGD), COMBINED N/A 8/17/2017    Procedure: COMBINED ENDOSCOPIC ULTRASOUND, ESOPHAGOSCOPY, GASTROSCOPY, DUODENOSCOPY (EGD);  ENDOSCOPIC ULTRASOUND, ESOPHAGOSCOPY, GASTROSCOPY, DUODENOSCOPY (EGD) with fine needle aspiration;  Surgeon: Elizabeth Castro MD;  Location: [facility]     ESOPHAGOSCOPY, GASTROSCOPY, DUODENOSCOPY (EGD), COMBINED N/A 10/22/2018    Procedure: ENDOSCOPIC ULTRASOUND, ESOPHAGOSCOPY, GASTROSCOPY, DUODENOSCOPY Fine needle aspiration;  Surgeon: Thai Holloway MD;  Location: [facility]     IR CHEST PORT PLACEMENT > 5 YRS OF AGE  12/10/2018     KERATOTOMY ARCUATE WITH FEMTOSECOND LASER/IMAGING FOR ATIOL Left 4/19/2017    Procedure: KERATOTOMY ARCUATE WITH FEMTOSECOND LASER/IMAGING FOR ATIOL;  LEFT EYE FEMTOSECOND LASER CAPSULOTOMY, LENS FRAGMENTATION, ARCUATE INCISIONS, CATARACT INCISIONS  ;  Surgeon: Riley Whatley MD;  Location: [facility]     ORTHOPEDIC SURGERY      right knee     ORTHOPEDIC SURGERY      right shoulder     PHACOEMULSIFICATION CLEAR CORNEA WITH STANDARD INTRAOCULAR LENS IMPLANT Left 4/19/2017    Procedure: PHACOEMULSIFICATION CLEAR CORNEA WITH STANDARD INTRAOCULAR LENS IMPLANT;   COMPLEX FEMTOLASER ASSISTED LEFT EYE PHACOEMULSIFICATION CLEAR CORNEA WITH STANDARD INTRAOCULAR LENS IMPLANT   ;  Surgeon: Riley Whatley MD;  Location: [facility]               Social History:   I have reviewed this patient's social history  Social History     Tobacco Use     Smoking status: Former Smoker     Types: Cigarettes     Smokeless tobacco: Never Used     Tobacco comment: quit 35-40 years ago    Substance Use Topics     Alcohol use: No     Comment: quit 35-40 years ago              Family History:   I have reviewed this patient's family history  History reviewed. No pertinent family history.           Allergies:   No Known Allergies          Medications:   I have reviewed this patient's current medications  Current Outpatient Medications   Medication Sig Dispense Refill     allopurinol (ZYLOPRIM) 300 MG tablet Take 300 mg by mouth every evening       aspirin (ASPIRIN ADULT LOW STRENGTH) 81 MG EC tablet Take 81 mg by mouth every morning       atenolol (TENORMIN) 100 MG tablet Take 1 tablet (100 mg) by mouth daily 90 tablet 4     glipiZIDE (GLUCOTROL) 5 MG tablet Take 5 mg by mouth every evening       lisinopril (PRINIVIL/ZESTRIL) 10 MG tablet Take 10 mg by mouth every evening       metFORMIN (GLUCOPHAGE) 1000 MG tablet Take 1,000 mg by mouth daily (with dinner)        omeprazole (PRILOSEC) 20 MG CR capsule Take 20 mg by mouth every evening        rosuvastatin (CRESTOR) 40 MG tablet Take 40 mg by mouth every evening       venlafaxine (EFFEXOR-ER) 75 MG TB24 24 hr tablet Take 75 mg by mouth every morning        zolpidem (AMBIEN) 5 MG tablet Take 5 mg by mouth nightly as needed for sleep                 Review of Systems:     Review of Systems:  Skin:  Positive for nail changes   Eyes:  Positive for glasses;cataracts  ENT:  Positive for postnasal drainage;persistent sore throat  Respiratory:  Positive for shortness of breath;dyspnea on exertion  Cardiovascular:    chest pain;Positive for;fatigue  Gastroenterology: Positive for nausea;reflux  Genitourinary:  Negative    Musculoskeletal:  Positive for joint pain  Neurologic:  Positive for numbness or tingling of feet  Psychiatric:  Positive for excessive stress;anxiety;depression  Heme/Lymph/Imm:  Negative    Endocrine:  Positive for diabetes                     Data:   All laboratory data reviewed  No results found for: CHOL  No results found for: HDL  No results found for: LDL  No results found for: TRIG  No results found for: CHOLHDLRATIO  No results found for: TSH  Last Basic Metabolic Panel:  Lab Results   Component Value Date     06/24/2018      Lab Results    Component Value Date    POTASSIUM 4.4 10/18/2018     Lab Results   Component Value Date    CHLORIDE 107 06/24/2018     Lab Results   Component Value Date    ELIANA 9.4 06/24/2018     Lab Results   Component Value Date    CO2 26 06/24/2018     Lab Results   Component Value Date    BUN 37 06/24/2018     Lab Results   Component Value Date    CR 1.80 10/18/2018     Lab Results   Component Value Date    .0 10/18/2018     Lab Results   Component Value Date    WBC 5.3 12/10/2018     Lab Results   Component Value Date    RBC 3.55 12/10/2018     Lab Results   Component Value Date    HGB 12.4 12/10/2018     Lab Results   Component Value Date    HCT 36.5 12/10/2018     Lab Results   Component Value Date     12/10/2018     Lab Results   Component Value Date    MCH 34.9 12/10/2018     Lab Results   Component Value Date    MCHC 34.0 12/10/2018     Lab Results   Component Value Date    RDW 17.3 12/10/2018     Lab Results   Component Value Date     12/10/2018

## 2020-01-22 NOTE — LETTER
1/22/2020    Polo Rudolph MD  [facility] [address]  [facility] [address]    RE: Ramon Winter       Dear Colleague,    I had the pleasure of seeing Ramon Winter in the [facility].    Cardiology Consultation      Ramon Winter MRN# [id number]   YOB: 1945 Age: 74 year old   Date of Visit 01/22/2020     Reason for consult:  Cardiomyopathy           Assessment and Plan:     1. Reduction in LV EF in the setting of Herceptin for progressive esophageal cancer    The ejection fraction may be underestimated.  Patient does not have any decompensated symptoms.  We will discontinue the hydrochlorothiazide portion of his combination pill to potentially reduce ectopy and reduce symptoms of lightheadedness and the drive for tachycardia.      Recheck echocardiogram in 1 month to reassess.    If function remains greater than 45%, would continue current Herceptin and Xeloda unchanged.      2. Trivial pericardial effusion without signs or symptoms of tamponade    Will follow again on future echocardiogram    This note was transcribed using electronic voice recognition software, typographical errors may be present.                Chief Complaint:   New Patient (New Consult Ref per Oncology )           History of Present Illness:   This patient is a very pleasant 74 year old male with progressive esophageal cancer currently on Xeloda and Herceptin.  He does not have exertional chest discomfort and feels exercise improves his intermittent/chronic discomfort.  His most recent echocardiogram in January 2020 suggested reduction in his ejection fraction to 47% compared to previous echocardiogram in October 2019.    I have reviewed the images personally and I feel that ectopy and shadowing may have led to some underestimation of cardiac function.  Patient feels tired but overall no distinct change in his functional status over the past couple  "months.    He is a little bit lightheaded today and his blood pressure is low.  He is currently on a combination atenolol 100 mg and hydrochlorothiazide 25 mg pill as well as lisinopril 10 mg and maximum dose rosuvastatin.           Physical Exam:     Vitals: BP 99/67 (BP Location: Right arm, Patient Position: Sitting, Cuff Size: Adult Regular)   Pulse 89   Ht 1.753 m (5' 9\")   Wt 75.3 kg (166 lb)   SpO2 97%   BMI 24.51 kg/m     Constitutional:  cooperative thin      Skin:  warm and dry to the touch        Head:  normocephalic, no masses or lesions        Eyes:  pupils equal and round, conjunctivae and lids unremarkable, sclera white, no xanthalasma, EOMS intact, no nystagmus        ENT:  no pallor or cyanosis, dentition good   hoarse    Neck:  JVP normal        Chest:  normal breath sounds, clear to auscultation, normal A-P diameter, normal symmetry, normal respiratory excursion, no use of accessory muscles        Cardiac:     distant heart sounds              Abdomen:  BS normoactive        Extremities and Back:  no deformities, clubbing, cyanosis, erythema observed        Neurological:  no gross motor deficits                    Past Medical History:   I have reviewed this patient's past medical history  Past Medical History:   Diagnosis Date     Actinic keratosis      Diabetes (H)     dm 2     Elevated C-reactive protein      Esophageal cancer (H)      GERD (gastroesophageal reflux disease)      Gout      Herpes      High cholesterol      Hyperlipidemia      Hypertension      Kidney stone      Low back pain      Osteoarthritis      Osteoarthritis      Other chronic pain     In knees and back     Restless legs syndrome              Past Surgical History:   I have reviewed this patient's past surgical history  Past Surgical History:   Procedure Laterality Date     ENT SURGERY      tonsilectomy     ESOPHAGOGASTRECTOMY N/A 11/7/2017    Procedure: ESOPHAGOGASTRECTOMY;  MARILEE BROOKS ESOPHAGOGASTRECTOMY, PLACEMENT " OF FEEDING TUBE JEJUNOSOTMY, PYLOROMYOTOMY, LIGATION THORACIC DUCT;  Surgeon: Maximilian Jeter MD;  Location:  OR     ESOPHAGOSCOPY, GASTROSCOPY, DUODENOSCOPY (EGD), COMBINED N/A 8/2/2017    Procedure: COMBINED ESOPHAGOSCOPY, GASTROSCOPY, DUODENOSCOPY (EGD), BIOPSY SINGLE OR MULTIPLE;  ESOPHAGOSCOPY, GASTROSCOPY, DUODENOSCOPY (EGD) with biopsy;  Surgeon: Jeffery Keene MD;  Location:  GI     ESOPHAGOSCOPY, GASTROSCOPY, DUODENOSCOPY (EGD), COMBINED N/A 8/17/2017    Procedure: COMBINED ENDOSCOPIC ULTRASOUND, ESOPHAGOSCOPY, GASTROSCOPY, DUODENOSCOPY (EGD);  ENDOSCOPIC ULTRASOUND, ESOPHAGOSCOPY, GASTROSCOPY, DUODENOSCOPY (EGD) with fine needle aspiration;  Surgeon: Elizabeth Castro MD;  Location:  OR     ESOPHAGOSCOPY, GASTROSCOPY, DUODENOSCOPY (EGD), COMBINED N/A 10/22/2018    Procedure: ENDOSCOPIC ULTRASOUND, ESOPHAGOSCOPY, GASTROSCOPY, DUODENOSCOPY Fine needle aspiration;  Surgeon: Thai Holloway MD;  Location: [facility]     IR CHEST PORT PLACEMENT > 5 YRS OF AGE  12/10/2018     KERATOTOMY ARCUATE WITH FEMTOSECOND LASER/IMAGING FOR ATIOL Left 4/19/2017    Procedure: KERATOTOMY ARCUATE WITH FEMTOSECOND LASER/IMAGING FOR ATIOL;  LEFT EYE FEMTOSECOND LASER CAPSULOTOMY, LENS FRAGMENTATION, ARCUATE INCISIONS, CATARACT INCISIONS  ;  Surgeon: Riley Whatley MD;  Location: [facility]     ORTHOPEDIC SURGERY      right knee     ORTHOPEDIC SURGERY      right shoulder     PHACOEMULSIFICATION CLEAR CORNEA WITH STANDARD INTRAOCULAR LENS IMPLANT Left 4/19/2017    Procedure: PHACOEMULSIFICATION CLEAR CORNEA WITH STANDARD INTRAOCULAR LENS IMPLANT;   COMPLEX FEMTOLASER ASSISTED LEFT EYE PHACOEMULSIFICATION CLEAR CORNEA WITH STANDARD INTRAOCULAR LENS IMPLANT   ;  Surgeon: Riley Whatley MD;  Location: [facility]               Social History:   I have reviewed this patient's social history  Social History     Tobacco Use     Smoking status: Former Smoker     Types: Cigarettes     Smokeless tobacco: Never  Used     Tobacco comment: quit 35-40 years ago    Substance Use Topics     Alcohol use: No     Comment: quit 35-40 years ago              Family History:   I have reviewed this patient's family history  History reviewed. No pertinent family history.          Allergies:   No Known Allergies          Medications:   I have reviewed this patient's current medications  Current Outpatient Medications   Medication Sig Dispense Refill     allopurinol (ZYLOPRIM) 300 MG tablet Take 300 mg by mouth every evening       aspirin (ASPIRIN ADULT LOW STRENGTH) 81 MG EC tablet Take 81 mg by mouth every morning       atenolol (TENORMIN) 100 MG tablet Take 1 tablet (100 mg) by mouth daily 90 tablet 4     glipiZIDE (GLUCOTROL) 5 MG tablet Take 5 mg by mouth every evening       lisinopril (PRINIVIL/ZESTRIL) 10 MG tablet Take 10 mg by mouth every evening       metFORMIN (GLUCOPHAGE) 1000 MG tablet Take 1,000 mg by mouth daily (with dinner)        omeprazole (PRILOSEC) 20 MG CR capsule Take 20 mg by mouth every evening        rosuvastatin (CRESTOR) 40 MG tablet Take 40 mg by mouth every evening       venlafaxine (EFFEXOR-ER) 75 MG TB24 24 hr tablet Take 75 mg by mouth every morning        zolpidem (AMBIEN) 5 MG tablet Take 5 mg by mouth nightly as needed for sleep                 Review of Systems:     Review of Systems:  Skin:  Positive for nail changes   Eyes:  Positive for glasses;cataracts  ENT:  Positive for postnasal drainage;persistent sore throat  Respiratory:  Positive for shortness of breath;dyspnea on exertion  Cardiovascular:    chest pain;Positive for;fatigue  Gastroenterology: Positive for nausea;reflux  Genitourinary:  Negative    Musculoskeletal:  Positive for joint pain  Neurologic:  Positive for numbness or tingling of feet  Psychiatric:  Positive for excessive stress;anxiety;depression  Heme/Lymph/Imm:  Negative    Endocrine:  Positive for diabetes                     Data:   All laboratory data reviewed  No results  found for: CHOL  No results found for: HDL  No results found for: LDL  No results found for: TRIG  No results found for: CHOLHDLRATIO  No results found for: TSH  Last Basic Metabolic Panel:  Lab Results   Component Value Date     06/24/2018      Lab Results   Component Value Date    POTASSIUM 4.4 10/18/2018     Lab Results   Component Value Date    CHLORIDE 107 06/24/2018     Lab Results   Component Value Date    ELIANA 9.4 06/24/2018     Lab Results   Component Value Date    CO2 26 06/24/2018     Lab Results   Component Value Date    BUN 37 06/24/2018     Lab Results   Component Value Date    CR 1.80 10/18/2018     Lab Results   Component Value Date    .0 10/18/2018     Lab Results   Component Value Date    WBC 5.3 12/10/2018     Lab Results   Component Value Date    RBC 3.55 12/10/2018     Lab Results   Component Value Date    HGB 12.4 12/10/2018     Lab Results   Component Value Date    HCT 36.5 12/10/2018     Lab Results   Component Value Date     12/10/2018     Lab Results   Component Value Date    MCH 34.9 12/10/2018     Lab Results   Component Value Date    MCHC 34.0 12/10/2018     Lab Results   Component Value Date    RDW 17.3 12/10/2018     Lab Results   Component Value Date     12/10/2018       Thank you for allowing me to participate in the care of your patient.    Sincerely,     Nilesh Valentino MD     [facility]

## 2020-01-28 ENCOUNTER — HOSPITAL ENCOUNTER (OUTPATIENT)
Dept: PET IMAGING | Facility: CLINIC | Age: 75
Discharge: HOME | End: 2020-01-28
Attending: INTERNAL MEDICINE | Admitting: INTERNAL MEDICINE
Payer: COMMERCIAL

## 2020-01-28 DIAGNOSIS — C16.0 MALIGNANT NEOPLASM OF CARDIA (H): ICD-10-CM

## 2020-01-28 LAB
CREAT BLD-MCNC: 2.5 MG/DL (ref 0.66–1.25)
GFR SERPL CREATININE-BSD FRML MDRD: 25 ML/MIN/{1.73_M2}

## 2020-01-28 PROCEDURE — 34300033 ZZH RX 343: Performed by: INTERNAL MEDICINE

## 2020-01-28 PROCEDURE — 74176 CT ABD & PELVIS W/O CONTRAST: CPT

## 2020-01-28 PROCEDURE — 82565 ASSAY OF CREATININE: CPT

## 2020-01-28 PROCEDURE — A9552 F18 FDG: HCPCS | Performed by: INTERNAL MEDICINE

## 2020-01-28 RX ORDER — IOPAMIDOL 755 MG/ML
50-135 INJECTION, SOLUTION INTRAVASCULAR ONCE
Status: DISCONTINUED | OUTPATIENT
Start: 2020-01-28 | End: 2020-01-28 | Stop reason: CLARIF

## 2020-01-28 RX ADMIN — FLUDEOXYGLUCOSE F-18 9.7 MCI.: 500 INJECTION, SOLUTION INTRAVENOUS at 09:49

## 2020-01-31 ENCOUNTER — MEDICAL CORRESPONDENCE (OUTPATIENT)
Dept: HEALTH INFORMATION MANAGEMENT | Facility: CLINIC | Age: 75
End: 2020-01-31

## 2020-01-31 ENCOUNTER — HOSPITAL ENCOUNTER (OUTPATIENT)
Dept: ULTRASOUND IMAGING | Facility: CLINIC | Age: 75
Discharge: HOME | End: 2020-01-31
Attending: INTERNAL MEDICINE | Admitting: INTERNAL MEDICINE
Payer: COMMERCIAL

## 2020-01-31 VITALS — SYSTOLIC BLOOD PRESSURE: 108 MMHG | OXYGEN SATURATION: 94 % | HEART RATE: 94 BPM | DIASTOLIC BLOOD PRESSURE: 69 MMHG

## 2020-01-31 DIAGNOSIS — R06.02 SHORTNESS OF BREATH: ICD-10-CM

## 2020-01-31 LAB
INR PPP: 1.14 (ref 0.86–1.14)
INR PPP: NORMAL (ref 0.86–1.14)
LDH FLD L TO P-CCNC: 389 U/L
PROT FLD-MCNC: 5.5 G/DL
SPECIMEN SOURCE FLD: NORMAL
SPECIMEN SOURCE FLD: NORMAL

## 2020-01-31 PROCEDURE — 84157 ASSAY OF PROTEIN OTHER: CPT | Performed by: RADIOLOGY

## 2020-01-31 PROCEDURE — 88305 TISSUE EXAM BY PATHOLOGIST: CPT | Performed by: RADIOLOGY

## 2020-01-31 PROCEDURE — 88305 TISSUE EXAM BY PATHOLOGIST: CPT | Mod: 26 | Performed by: RADIOLOGY

## 2020-01-31 PROCEDURE — 00000102 ZZHCL STATISTIC CYTO WRIGHT STAIN TC: Performed by: RADIOLOGY

## 2020-01-31 PROCEDURE — 25000125 ZZHC RX 250: Performed by: RADIOLOGY

## 2020-01-31 PROCEDURE — 83615 LACTATE (LD) (LDH) ENZYME: CPT | Performed by: RADIOLOGY

## 2020-01-31 PROCEDURE — 32555 ASPIRATE PLEURA W/ IMAGING: CPT

## 2020-01-31 PROCEDURE — 88112 CYTOPATH CELL ENHANCE TECH: CPT | Performed by: RADIOLOGY

## 2020-01-31 PROCEDURE — 89051 BODY FLUID CELL COUNT: CPT | Performed by: RADIOLOGY

## 2020-01-31 PROCEDURE — 00000155 ZZHCL STATISTIC H-CELL BLOCK W/STAIN: Performed by: RADIOLOGY

## 2020-01-31 PROCEDURE — 88112 CYTOPATH CELL ENHANCE TECH: CPT | Mod: 26 | Performed by: RADIOLOGY

## 2020-01-31 PROCEDURE — 85610 PROTHROMBIN TIME: CPT | Performed by: RADIOLOGY

## 2020-01-31 RX ADMIN — LIDOCAINE HYDROCHLORIDE 10 ML: 10 INJECTION, SOLUTION EPIDURAL; INFILTRATION; INTRACAUDAL; PERINEURAL at 14:51

## 2020-01-31 NOTE — PROGRESS NOTES
Consent for left thoracentesis obtained by Dr. Morales after lab work evaluated for INR.  Total 1100 ml's yellow fluid removed.  Pt weak but vital signs stable.  States understanding of post care instructions. Post CXR deferred by DR. Morales.  Pt discharged to home per self per wheelchair to front door.

## 2020-02-03 LAB
APPEARANCE FLD: NORMAL
COLOR FLD: YELLOW
EOSINOPHIL NFR FLD MANUAL: 2 %
LYMPHOCYTES NFR FLD MANUAL: 9 %
MONOS+MACROS NFR FLD MANUAL: 81 %
NEUTS BAND NFR FLD MANUAL: 1 %
OTHER CELLS FLD MANUAL: 7 %
SPECIMEN SOURCE FLD: NORMAL
WBC # FLD AUTO: 644 /UL

## 2020-02-04 LAB — COPATH REPORT: NORMAL

## 2020-02-05 ENCOUNTER — APPOINTMENT (OUTPATIENT)
Dept: GENERAL RADIOLOGY | Facility: CLINIC | Age: 75
DRG: 391 | End: 2020-02-05
Attending: EMERGENCY MEDICINE
Payer: COMMERCIAL

## 2020-02-05 ENCOUNTER — HOSPITAL ENCOUNTER (INPATIENT)
Facility: CLINIC | Age: 75
LOS: 6 days | Discharge: HOME HEALTH | DRG: 391 | End: 2020-02-12
Attending: EMERGENCY MEDICINE | Admitting: HOSPITALIST
Payer: COMMERCIAL

## 2020-02-05 DIAGNOSIS — R11.0 NAUSEA: ICD-10-CM

## 2020-02-05 DIAGNOSIS — I95.1 ORTHOSTATIC HYPOTENSION: ICD-10-CM

## 2020-02-05 DIAGNOSIS — F32.A DEPRESSION, UNSPECIFIED DEPRESSION TYPE: ICD-10-CM

## 2020-02-05 DIAGNOSIS — R55 SYNCOPE, UNSPECIFIED SYNCOPE TYPE: ICD-10-CM

## 2020-02-05 DIAGNOSIS — K52.9 COLITIS: Primary | ICD-10-CM

## 2020-02-05 DIAGNOSIS — C15.5 PRIMARY ADENOCARCINOMA OF DISTAL THIRD OF ESOPHAGUS (H): ICD-10-CM

## 2020-02-05 LAB
ANION GAP SERPL CALCULATED.3IONS-SCNC: 10 MMOL/L (ref 3–14)
BASOPHILS # BLD AUTO: 0.1 10E9/L (ref 0–0.2)
BASOPHILS NFR BLD AUTO: 0.6 %
BUN SERPL-MCNC: 54 MG/DL (ref 7–30)
CALCIUM SERPL-MCNC: 9 MG/DL (ref 8.5–10.1)
CHLORIDE SERPL-SCNC: 108 MMOL/L (ref 94–109)
CO2 SERPL-SCNC: 19 MMOL/L (ref 20–32)
CREAT SERPL-MCNC: 2.97 MG/DL (ref 0.66–1.25)
DIFFERENTIAL METHOD BLD: ABNORMAL
EOSINOPHIL # BLD AUTO: 0.2 10E9/L (ref 0–0.7)
EOSINOPHIL NFR BLD AUTO: 1.9 %
ERYTHROCYTE [DISTWIDTH] IN BLOOD BY AUTOMATED COUNT: 17.2 % (ref 10–15)
GFR SERPL CREATININE-BSD FRML MDRD: 20 ML/MIN/{1.73_M2}
GLUCOSE SERPL-MCNC: 203 MG/DL (ref 70–99)
HBA1C MFR BLD: 6.4 % (ref 0–5.6)
HCT VFR BLD AUTO: 35.9 % (ref 40–53)
HGB BLD-MCNC: 11.7 G/DL (ref 13.3–17.7)
IMM GRANULOCYTES # BLD: 0.1 10E9/L (ref 0–0.4)
IMM GRANULOCYTES NFR BLD: 0.5 %
INTERPRETATION ECG - MUSE: NORMAL
LACTATE BLD-SCNC: 1.4 MMOL/L (ref 0.7–2)
LACTATE BLD-SCNC: 3.2 MMOL/L (ref 0.7–2)
LYMPHOCYTES # BLD AUTO: 1 10E9/L (ref 0.8–5.3)
LYMPHOCYTES NFR BLD AUTO: 10.5 %
MCH RBC QN AUTO: 35.2 PG (ref 26.5–33)
MCHC RBC AUTO-ENTMCNC: 32.6 G/DL (ref 31.5–36.5)
MCV RBC AUTO: 108 FL (ref 78–100)
MONOCYTES # BLD AUTO: 0.9 10E9/L (ref 0–1.3)
MONOCYTES NFR BLD AUTO: 9.5 %
NEUTROPHILS # BLD AUTO: 7.3 10E9/L (ref 1.6–8.3)
NEUTROPHILS NFR BLD AUTO: 77 %
NRBC # BLD AUTO: 0 10*3/UL
NRBC BLD AUTO-RTO: 0 /100
PLATELET # BLD AUTO: 187 10E9/L (ref 150–450)
POTASSIUM SERPL-SCNC: 4.6 MMOL/L (ref 3.4–5.3)
RBC # BLD AUTO: 3.32 10E12/L (ref 4.4–5.9)
SODIUM SERPL-SCNC: 137 MMOL/L (ref 133–144)
TROPONIN I SERPL-MCNC: <0.015 UG/L (ref 0–0.04)
WBC # BLD AUTO: 9.5 10E9/L (ref 4–11)

## 2020-02-05 PROCEDURE — 83605 ASSAY OF LACTIC ACID: CPT | Performed by: EMERGENCY MEDICINE

## 2020-02-05 PROCEDURE — 25800030 ZZH RX IP 258 OP 636: Performed by: EMERGENCY MEDICINE

## 2020-02-05 PROCEDURE — 84484 ASSAY OF TROPONIN QUANT: CPT | Performed by: EMERGENCY MEDICINE

## 2020-02-05 PROCEDURE — 25000128 H RX IP 250 OP 636

## 2020-02-05 PROCEDURE — 83036 HEMOGLOBIN GLYCOSYLATED A1C: CPT | Performed by: EMERGENCY MEDICINE

## 2020-02-05 PROCEDURE — 96374 THER/PROPH/DIAG INJ IV PUSH: CPT

## 2020-02-05 PROCEDURE — G0378 HOSPITAL OBSERVATION PER HR: HCPCS

## 2020-02-05 PROCEDURE — 25000128 H RX IP 250 OP 636: Performed by: EMERGENCY MEDICINE

## 2020-02-05 PROCEDURE — 96361 HYDRATE IV INFUSION ADD-ON: CPT

## 2020-02-05 PROCEDURE — 99219 ZZC INITIAL OBSERVATION CARE,LEVL II: CPT | Performed by: PHYSICIAN ASSISTANT

## 2020-02-05 PROCEDURE — 85025 COMPLETE CBC W/AUTO DIFF WBC: CPT | Performed by: EMERGENCY MEDICINE

## 2020-02-05 PROCEDURE — 93005 ELECTROCARDIOGRAM TRACING: CPT

## 2020-02-05 PROCEDURE — 71046 X-RAY EXAM CHEST 2 VIEWS: CPT

## 2020-02-05 PROCEDURE — 25800030 ZZH RX IP 258 OP 636: Performed by: PHYSICIAN ASSISTANT

## 2020-02-05 PROCEDURE — 80048 BASIC METABOLIC PNL TOTAL CA: CPT | Performed by: EMERGENCY MEDICINE

## 2020-02-05 PROCEDURE — 99285 EMERGENCY DEPT VISIT HI MDM: CPT | Mod: 25

## 2020-02-05 RX ORDER — METHYLPHENIDATE HYDROCHLORIDE 5 MG/1
5-10 TABLET ORAL DAILY PRN
COMMUNITY

## 2020-02-05 RX ORDER — NALOXONE HYDROCHLORIDE 0.4 MG/ML
.1-.4 INJECTION, SOLUTION INTRAMUSCULAR; INTRAVENOUS; SUBCUTANEOUS
Status: DISCONTINUED | OUTPATIENT
Start: 2020-02-05 | End: 2020-02-12 | Stop reason: HOSPADM

## 2020-02-05 RX ORDER — GABAPENTIN 300 MG/1
300 CAPSULE ORAL DAILY
Status: ON HOLD | COMMUNITY
End: 2020-02-12

## 2020-02-05 RX ORDER — DRONABINOL 10 MG/1
10 CAPSULE ORAL 2 TIMES DAILY
COMMUNITY

## 2020-02-05 RX ORDER — ONDANSETRON 2 MG/ML
4 INJECTION INTRAMUSCULAR; INTRAVENOUS EVERY 6 HOURS PRN
Status: DISCONTINUED | OUTPATIENT
Start: 2020-02-05 | End: 2020-02-12 | Stop reason: HOSPADM

## 2020-02-05 RX ORDER — DIPHENOXYLATE HCL/ATROPINE 2.5-.025MG
1 TABLET ORAL 4 TIMES DAILY PRN
COMMUNITY

## 2020-02-05 RX ORDER — PROCHLORPERAZINE 25 MG
12.5 SUPPOSITORY, RECTAL RECTAL EVERY 12 HOURS PRN
Status: DISCONTINUED | OUTPATIENT
Start: 2020-02-05 | End: 2020-02-08

## 2020-02-05 RX ORDER — LIDOCAINE 40 MG/G
CREAM TOPICAL
Status: DISCONTINUED | OUTPATIENT
Start: 2020-02-05 | End: 2020-02-12 | Stop reason: HOSPADM

## 2020-02-05 RX ORDER — SODIUM CHLORIDE 9 MG/ML
INJECTION, SOLUTION INTRAVENOUS CONTINUOUS
Status: DISCONTINUED | OUTPATIENT
Start: 2020-02-05 | End: 2020-02-06

## 2020-02-05 RX ORDER — ASPIRIN 81 MG/1
81 TABLET ORAL EVERY MORNING
Status: DISCONTINUED | OUTPATIENT
Start: 2020-02-06 | End: 2020-02-12 | Stop reason: HOSPADM

## 2020-02-05 RX ORDER — SODIUM CHLORIDE 9 MG/ML
1000 INJECTION, SOLUTION INTRAVENOUS CONTINUOUS
Status: DISCONTINUED | OUTPATIENT
Start: 2020-02-05 | End: 2020-02-05

## 2020-02-05 RX ORDER — PROCHLORPERAZINE MALEATE 10 MG
10 TABLET ORAL EVERY 6 HOURS PRN
Status: ON HOLD | COMMUNITY
Start: 2020-02-04 | End: 2020-02-09

## 2020-02-05 RX ORDER — GABAPENTIN 100 MG/1
100 CAPSULE ORAL EVERY MORNING
Status: DISCONTINUED | OUTPATIENT
Start: 2020-02-06 | End: 2020-02-06

## 2020-02-05 RX ORDER — VENLAFAXINE 75 MG/1
75 TABLET ORAL 2 TIMES DAILY
Status: ON HOLD | COMMUNITY
End: 2020-02-12

## 2020-02-05 RX ORDER — ONDANSETRON 2 MG/ML
INJECTION INTRAMUSCULAR; INTRAVENOUS
Status: COMPLETED
Start: 2020-02-05 | End: 2020-02-05

## 2020-02-05 RX ORDER — TRAMADOL HYDROCHLORIDE 50 MG/1
50 TABLET ORAL EVERY 6 HOURS PRN
Status: DISCONTINUED | OUTPATIENT
Start: 2020-02-05 | End: 2020-02-12 | Stop reason: HOSPADM

## 2020-02-05 RX ORDER — LORAZEPAM 0.5 MG/1
0.5 TABLET ORAL EVERY 4 HOURS
Status: ON HOLD | COMMUNITY
Start: 2020-02-04 | End: 2020-02-12

## 2020-02-05 RX ORDER — TRAMADOL HYDROCHLORIDE 50 MG/1
50 TABLET ORAL EVERY 6 HOURS PRN
COMMUNITY

## 2020-02-05 RX ORDER — VENLAFAXINE 75 MG/1
75 TABLET ORAL 2 TIMES DAILY
Status: DISCONTINUED | OUTPATIENT
Start: 2020-02-06 | End: 2020-02-11

## 2020-02-05 RX ORDER — SODIUM CHLORIDE 9 MG/ML
INJECTION, SOLUTION INTRAVENOUS CONTINUOUS
Status: DISCONTINUED | OUTPATIENT
Start: 2020-02-05 | End: 2020-02-05

## 2020-02-05 RX ORDER — ONDANSETRON 4 MG/1
4 TABLET, ORALLY DISINTEGRATING ORAL EVERY 6 HOURS PRN
Status: DISCONTINUED | OUTPATIENT
Start: 2020-02-05 | End: 2020-02-12 | Stop reason: HOSPADM

## 2020-02-05 RX ORDER — PROCHLORPERAZINE MALEATE 5 MG
5 TABLET ORAL EVERY 6 HOURS PRN
Status: DISCONTINUED | OUTPATIENT
Start: 2020-02-05 | End: 2020-02-08

## 2020-02-05 RX ORDER — NITROGLYCERIN 0.4 MG/1
0.4 TABLET SUBLINGUAL EVERY 5 MIN PRN
Status: DISCONTINUED | OUTPATIENT
Start: 2020-02-05 | End: 2020-02-12 | Stop reason: HOSPADM

## 2020-02-05 RX ORDER — GABAPENTIN 300 MG/1
300 CAPSULE ORAL DAILY
Status: DISCONTINUED | OUTPATIENT
Start: 2020-02-06 | End: 2020-02-06

## 2020-02-05 RX ORDER — SALIVA STIMULANT COMB. NO.3
SPRAY, NON-AEROSOL (ML) MUCOUS MEMBRANE
COMMUNITY
Start: 2020-02-03

## 2020-02-05 RX ORDER — LORAZEPAM 0.5 MG/1
0.5 TABLET ORAL EVERY 4 HOURS
Status: DISCONTINUED | OUTPATIENT
Start: 2020-02-05 | End: 2020-02-06

## 2020-02-05 RX ORDER — ACETAMINOPHEN 325 MG/1
650 TABLET ORAL EVERY 4 HOURS PRN
Status: DISCONTINUED | OUTPATIENT
Start: 2020-02-05 | End: 2020-02-12 | Stop reason: HOSPADM

## 2020-02-05 RX ORDER — GABAPENTIN 100 MG/1
100 CAPSULE ORAL EVERY MORNING
COMMUNITY

## 2020-02-05 RX ADMIN — SODIUM CHLORIDE 100 ML: 9 INJECTION, SOLUTION INTRAVENOUS at 14:41

## 2020-02-05 RX ADMIN — SODIUM CHLORIDE 1000 ML: 9 INJECTION, SOLUTION INTRAVENOUS at 19:04

## 2020-02-05 RX ADMIN — PROCHLORPERAZINE EDISYLATE 5 MG: 5 INJECTION INTRAMUSCULAR; INTRAVENOUS at 17:40

## 2020-02-05 RX ADMIN — SODIUM CHLORIDE: 9 INJECTION, SOLUTION INTRAVENOUS at 18:59

## 2020-02-05 RX ADMIN — ONDANSETRON HYDROCHLORIDE 4 MG: 2 INJECTION, SOLUTION INTRAMUSCULAR; INTRAVENOUS at 16:28

## 2020-02-05 ASSESSMENT — ENCOUNTER SYMPTOMS
SHORTNESS OF BREATH: 1
WEAKNESS: 1
LIGHT-HEADEDNESS: 1
VOMITING: 1
NAUSEA: 1
DIZZINESS: 1
FATIGUE: 1

## 2020-02-05 ASSESSMENT — ACTIVITIES OF DAILY LIVING (ADL)
SWALLOWING: 0-->SWALLOWS FOODS/LIQUIDS WITHOUT DIFFICULTY
RETIRED_COMMUNICATION: 2-->DIFFICULTY UNDERSTANDING (NOT RELATED TO LANGUAGE BARRIER)
DRESS: 3-->ASSISTIVE EQUIPMENT AND PERSON
TOILETING: 3-->ASSISTIVE EQUIPMENT AND PERSON
RETIRED_EATING: 3-->ASSISTIVE EQUIPMENT AND PERSON
BATHING: 3-->ASSISTIVE EQUIPMENT AND PERSON

## 2020-02-05 NOTE — ED NOTES
.Observation Brochure and Video  Observation status based on provider's order. Observation Brochure was given and video watched by patient and daughter, Radhika.  Alize Arriaga RN

## 2020-02-05 NOTE — ED TRIAGE NOTES
Pt presents to ed with c/o of several weeks of near syncopal episodes. Pt was at oncology clinic today and had a similar episode where he got pale and felt like he was going to pass out but did not. Denies falls or head injury. BP in high 80s to low 90s at clinic. 1L IVF given as well as nausea meds. Hx of esophogeal cancer. Last chemo 7-8 days ago. Dr. Hernandez is pt's oncologist.

## 2020-02-05 NOTE — ED PROVIDER NOTES
History     Chief Complaint:  Presyncope      HPI   Ramon Winter is a 74 year old male with esophageal cancer who presents after a presyncopal episode. The patient says that for the last 3 weeks he has been feeling weak and sleeping more often. He says that getting up quickly makes him dizzy and he has also been having bouts of vomiting. He says that he had a syncopal episode 2 days ago. The patient notes that he stopped taking his chemotherapy maintenance pills 8 days ago, as part of his treatment plan. He says that he had never undergone radiation for his cancer. The patient says that he went to his oncology clinic today and he had another near syncopal episode. He was given a liter of IV fluids and nausea medications and sent home. The patient's family says that they brought him here for further evaluation. The patient endorses some chest pain and shortness of breath, but notes that these have been present for years.     Allergies:  No Known Drug Allergies     Medications:    Ambien  Effexor  Crestor  Prilosec  Metformin   Lisinopril   Glucotrol  Tenormin  Aspirin       Past Medical History:    RLS  osteoarthritis  Kidney stone  hypertension  hyperlipidemia  hypercholesterolemia  Herpes  Gout  GERD  Esophageal cancer  Diabetes actinic keratosis  Primary adenocarcinoma of distal third of esophagus      Past Surgical History:    Phacoemulsification clear cornea with standard intraocular lens implants   Orthopedic surgery x2  Keratotomy arcuate with femtosecond laser/imaging for ATIOL  Chest port placement  EGD x3  Esophagogastrectomy   Tonsillectomy     Family History:    Family history reviewed. No pertinent family history.     Social History:  The patient was accompanied to the ED by daughter.  Smoking Status: Former Smoker  Smokeless Tobacco: Never Used  Alcohol Use: Negative   Drug Use: Negative  PCP: Polo Rudolph   Marital Status:        Review of Systems   Constitutional: Positive for  fatigue.   Respiratory: Positive for shortness of breath.    Cardiovascular: Positive for chest pain.   Gastrointestinal: Positive for nausea and vomiting.   Neurological: Positive for dizziness, weakness and light-headedness.   All other systems reviewed and are negative.    Physical Exam     Patient Vitals for the past 24 hrs:   BP Pulse Heart Rate Resp SpO2 Weight   02/05/20 1600 134/77 52 51 21 92 % --   02/05/20 1445 (!) 87/62 69 82 21 100 % --   02/05/20 1430 (!) 88/53 72 76 24 99 % --   02/05/20 1415 98/65 73 69 23 98 % --   02/05/20 1400 99/62 66 -- 28 97 % --   02/05/20 1357 90/62 56 56 16 99 % 70.3 kg (155 lb)        Physical Exam  General: Patient is alert and interactive when I enter the room, tired appearing  Head:  The scalp, face, and head appear normal  Eyes:  Conjunctivae are normal  ENT:    The nose is normal    Pinnae are normal    External acoustic canals are normal  Neck:  Trachea midline  CV:  Pulses are normal, RRR  Resp:  No respiratory distress, CTAB   Abdomen:      Soft, non-tender, non-distended  Musc:  Normal muscular tone    No major joint effusions    No asymmetric leg swelling  Skin:  No rash or lesions noted  Neuro:  Speech is normal and fluent. Face is symmetric.     Moving all extremities well.   Psych: Awake. Alert.  Normal affect.  Appropriate interactions.    Emergency Department Course     ECG:  ECG taken at 1353, ECG read at 1354  Sinus bradycardia   Otherwise normal ECG  Rate 55 bpm. MI interval 162 ms. QRS duration 110 ms. QT/QTc 438/419 ms. P-R-T axes 34 62 45.     Imaging:  Radiology findings were communicated with the patient who voiced understanding of the findings.    XR Chest 2 Views  Small bilateral pleural effusions with minor atelectasis  at the right lung base.  SKYE CORONADO MD  Reading per radiology    Laboratory:  Laboratory findings were communicated with the patient who voiced understanding of the findings.    CBC: WBC 9.5, HGB 11.7 (L),   BMP: carbon  dioxide 19 (L),  (H), BUN 54 (H), creatinine 2.97 (H), GFR 20 (L) o/w WNL  Lactic Acid (Resulted: 1439): 3.2 (H)  Troponin (Collected 1439): <0.015     Interventions:  1441  NS infusion 100 mL IV  1628 Zofran 4 mg IV     Emergency Department Course:    1406 Nursing notes and vitals reviewed.    1410 I performed an exam of the patient as documented above.     1439 IV was inserted and blood was drawn for laboratory testing, results above.     1504 The patient was sent for a XR while in the emergency department, results above.      1645 Patient rechecked and updated.      1701 I spoke with Dr. Escalona of the hospitalist service regarding patient's presentation, findings, and plan of care.      The patient is admitted into the care of Dr. Escalona .     Impression & Plan      Medical Decision Making:  Ramon Winter is a 74 year old male with a history of esophageal cancer who presents to the emergency department today for evaluation of presyncope and syncope.  Patient admits to feeling quite dizzy when getting up.  He is currently on chemotherapy for esophageal cancer.  EKG was unremarkable.  He does appear quite cachectic and tired.  Lactate was elevated at 3 and he does have an SAW with a creatinine of 2.9.  It seems like before this his creatinine was in the 1 region.  However it was 2.5 few weeks ago.  Patient was given a liter fluids and we started him on maintenance fluid.  He does have a history of possible cardiomyopathy secondary to chemotherapy however cardiology seems to think that the ejection fracture was underestimated.  Chest x-ray revealed pleural effusions but only small.  Patient is afebrile.  His blood pressure greatly improved after fluids initially his pressures were in the 80s but this improved into the 130s systolic.  I suspect a lot of his symptoms are failure to thrive.  We will met him to [facility] for continued hydration and ensure improvement of appetite.  Patient was given Zofran and  Compazine for nausea here.  Patient admitted to obs in stable condition.        Diagnosis:    ICD-10-CM    1. Syncope, unspecified syncope type R55      Disposition:   Findings and plan explained to the Patient and family who consents to admission. Discussed the patient with Dr. Escalona , who will admit the patient to a inpatient bed for further monitoring, evaluation, and treatment.     Scribe Disclosure:  I, Ramon Jefferson, am serving as a scribe at 2:08 PM on 2/5/2020 to document services personally performed by Veronica Bustos MD based on my observations and the provider's statements to me.      [facility] EMERGENCY DEPARTMENT       Veronica Bustos MD  02/05/20 2824

## 2020-02-05 NOTE — ED NOTES
.  [facility]  ED Nurse Handoff Report    Ramon Winter is a 74 year old male   ED Chief complaint: Presyncope  . ED Diagnosis:   Final diagnoses:   Syncope, unspecified syncope type     Allergies: No Known Allergies    Code Status: Full Code  Activity level - Baseline/Home:  Independent. Activity Level - Current:   Stand by Assist. Lift room needed: No. Bariatric: No   Needed: No   Isolation: No. Infection: Not Applicable.     Vital Signs:   Vitals:    02/05/20 1600 02/05/20 1630 02/05/20 1645 02/05/20 1700   BP: 134/77 130/76 139/81 120/77   Pulse: 52 50 (!) 49 (!) 47   Resp: 21      SpO2: 92% 97% 98% 100%   Weight:           Cardiac Rhythm:  ,      Pain level:    Patient confused: No. Patient Falls Risk: Yes.   Elimination Status: Has voided x 2  Patient Report - Initial Complaint: 74 year old male with esophageal cancer who presents after a presyncopal episode. The patient says that for the last 3 weeks he has been feeling weak and sleeping more often. He says that getting up quickly makes him dizzy and he has also been having bouts of vomiting. He says that he had a syncopal episode 2 days ago. The patient notes that he stopped taking his chemotherapy maintenance pills 8 days ago, as part of his treatment plan. He says that he had never undergone radiation for his cancer. The patient says that he went to his oncology clinic today and he had another near syncopal episode. He was given a liter of IV fluids and nausea medications and sent home. The patient's family says that they brought him here for further evaluation. The patient endorses some chest pain and shortness of breath, but notes that these have been present for years. . Focused Assessment: Cognitive - Cognitive/Neuro/Behavioral WDL: -WDL except (frequent near syncope episodes in the past several weeks. Episode today while at oncology, turned pale and told to come to ED. Hypotensive at this time. Denies feeling light-headed.  Rcvd 1L NS at oncology prior to arrival) Level Of Consciousness: alert  Arousal Level: opens eyes spontaneously  Orientation: oriented x 4  Follows Commands: yes  Best Language: 0 - No aphasia  Mood/Behavior: calm; cooperative   Benedicto Coma Scale - Best Eye Response: 4-->(E4) spontaneous  Best Motor Response: 6-->(M6) obeys commands  Best Verbal Response: 5-->(V5) oriented  Benedicto Coma Scale Score: 15   Motor Strength - Left Upper: 5 - active movement against gravity and full resistance  Right Upper: 5 - active movement against gravity and full resistance  Left Lower: 5 - active movement against gravity and full resistance  Right Lower: 5 - active movement against gravity and full resistance    Tests Performed: EKG, labs, CXR.   Abnormal Results: .  Labs Ordered and Resulted from Time of ED Arrival Up to the Time of Departure from the ED   CBC WITH PLATELETS DIFFERENTIAL - Abnormal; Notable for the following components:       Result Value    RBC Count 3.32 (*)     Hemoglobin 11.7 (*)     Hematocrit 35.9 (*)      (*)     MCH 35.2 (*)     RDW 17.2 (*)     All other components within normal limits   BASIC METABOLIC PANEL - Abnormal; Notable for the following components:    Carbon Dioxide 19 (*)     Glucose 203 (*)     Urea Nitrogen 54 (*)     Creatinine 2.97 (*)     GFR Estimate 20 (*)     GFR Estimate If Black 23 (*)     All other components within normal limits   LACTIC ACID WHOLE BLOOD - Abnormal; Notable for the following components:    Lactic Acid 3.2 (*)     All other components within normal limits   TROPONIN I   LACTIC ACID WHOLE BLOOD     .  XR Chest 2 Views   Final Result   IMPRESSION: Small bilateral pleural effusions with minor atelectasis   at the right lung base.      SKYE CORONADO MD         Treatments provided: see ED meds below  Family Comments: NA  OBS brochure/video discussed/provided to patient:  N/A  ED Medications:   Medications   sodium chloride 0.9% infusion (100 mLs Intravenous New  Bag 2/5/20 1441)   prochlorperazine (COMPAZINE) injection 5 mg (has no administration in time range)   ondansetron (ZOFRAN) 2 MG/ML injection (4 mg  Given 2/5/20 1628)     Drips infusing:  No  For the majority of the shift, the patient's behavior Green. Interventions performed were NA.     Severe Sepsis OR Septic Shock Diagnosis Present: No      ED Nurse Name/Phone Number: Alize Arriaga RN ERB   5:34 PM    RECEIVING UNIT ED HANDOFF REVIEW    Above ED Nurse Handoff Report was reviewed: Yes  Reviewed by: Jennifer Daley RN on February 5, 2020 at 5:49 PM

## 2020-02-06 ENCOUNTER — APPOINTMENT (OUTPATIENT)
Dept: CT IMAGING | Facility: CLINIC | Age: 75
DRG: 391 | End: 2020-02-06
Attending: PHYSICIAN ASSISTANT
Payer: COMMERCIAL

## 2020-02-06 PROBLEM — R50.9 FEVER: Status: ACTIVE | Noted: 2020-02-06

## 2020-02-06 LAB
ALBUMIN UR-MCNC: 30 MG/DL
ANION GAP SERPL CALCULATED.3IONS-SCNC: 9 MMOL/L (ref 3–14)
APPEARANCE UR: CLEAR
BASOPHILS # BLD AUTO: 0 10E9/L (ref 0–0.2)
BASOPHILS NFR BLD AUTO: 0.2 %
BILIRUB UR QL STRIP: NEGATIVE
BUN SERPL-MCNC: 53 MG/DL (ref 7–30)
CALCIUM SERPL-MCNC: 8.8 MG/DL (ref 8.5–10.1)
CHLORIDE SERPL-SCNC: 112 MMOL/L (ref 94–109)
CO2 SERPL-SCNC: 18 MMOL/L (ref 20–32)
COLOR UR AUTO: ABNORMAL
CREAT SERPL-MCNC: 2.6 MG/DL (ref 0.66–1.25)
DIFFERENTIAL METHOD BLD: ABNORMAL
EOSINOPHIL # BLD AUTO: 0 10E9/L (ref 0–0.7)
EOSINOPHIL NFR BLD AUTO: 0 %
ERYTHROCYTE [DISTWIDTH] IN BLOOD BY AUTOMATED COUNT: 17.4 % (ref 10–15)
GFR SERPL CREATININE-BSD FRML MDRD: 23 ML/MIN/{1.73_M2}
GLUCOSE SERPL-MCNC: 160 MG/DL (ref 70–99)
GLUCOSE UR STRIP-MCNC: NEGATIVE MG/DL
HCT VFR BLD AUTO: 35.4 % (ref 40–53)
HGB BLD-MCNC: 11.5 G/DL (ref 13.3–17.7)
HGB UR QL STRIP: ABNORMAL
IMM GRANULOCYTES # BLD: 0.1 10E9/L (ref 0–0.4)
IMM GRANULOCYTES NFR BLD: 0.5 %
KETONES UR STRIP-MCNC: NEGATIVE MG/DL
LACTATE BLD-SCNC: 1.1 MMOL/L (ref 0.7–2)
LEUKOCYTE ESTERASE UR QL STRIP: NEGATIVE
LYMPHOCYTES # BLD AUTO: 0.6 10E9/L (ref 0.8–5.3)
LYMPHOCYTES NFR BLD AUTO: 4.1 %
MCH RBC QN AUTO: 34.4 PG (ref 26.5–33)
MCHC RBC AUTO-ENTMCNC: 32.5 G/DL (ref 31.5–36.5)
MCV RBC AUTO: 106 FL (ref 78–100)
MONOCYTES # BLD AUTO: 0.8 10E9/L (ref 0–1.3)
MONOCYTES NFR BLD AUTO: 5.5 %
MUCOUS THREADS #/AREA URNS LPF: PRESENT /LPF
NEUTROPHILS # BLD AUTO: 13.2 10E9/L (ref 1.6–8.3)
NEUTROPHILS NFR BLD AUTO: 89.7 %
NITRATE UR QL: NEGATIVE
NRBC # BLD AUTO: 0 10*3/UL
NRBC BLD AUTO-RTO: 0 /100
PH UR STRIP: 5 PH (ref 5–7)
PLATELET # BLD AUTO: 170 10E9/L (ref 150–450)
POTASSIUM SERPL-SCNC: 4.8 MMOL/L (ref 3.4–5.3)
RBC # BLD AUTO: 3.34 10E12/L (ref 4.4–5.9)
RBC #/AREA URNS AUTO: 1 /HPF (ref 0–2)
SODIUM SERPL-SCNC: 139 MMOL/L (ref 133–144)
SOURCE: ABNORMAL
SP GR UR STRIP: 1.02 (ref 1–1.03)
UROBILINOGEN UR STRIP-MCNC: NORMAL MG/DL (ref 0–2)
WBC # BLD AUTO: 14.8 10E9/L (ref 4–11)
WBC #/AREA URNS AUTO: 1 /HPF (ref 0–5)

## 2020-02-06 PROCEDURE — 36415 COLL VENOUS BLD VENIPUNCTURE: CPT | Performed by: PHYSICIAN ASSISTANT

## 2020-02-06 PROCEDURE — 25000132 ZZH RX MED GY IP 250 OP 250 PS 637: Performed by: PHYSICIAN ASSISTANT

## 2020-02-06 PROCEDURE — 70450 CT HEAD/BRAIN W/O DYE: CPT

## 2020-02-06 PROCEDURE — 99233 SBSQ HOSP IP/OBS HIGH 50: CPT | Performed by: PHYSICIAN ASSISTANT

## 2020-02-06 PROCEDURE — 85004 AUTOMATED DIFF WBC COUNT: CPT | Performed by: PHYSICIAN ASSISTANT

## 2020-02-06 PROCEDURE — 25800030 ZZH RX IP 258 OP 636: Performed by: PHYSICIAN ASSISTANT

## 2020-02-06 PROCEDURE — 81001 URINALYSIS AUTO W/SCOPE: CPT | Performed by: PHYSICIAN ASSISTANT

## 2020-02-06 PROCEDURE — 85027 COMPLETE CBC AUTOMATED: CPT | Performed by: PHYSICIAN ASSISTANT

## 2020-02-06 PROCEDURE — 12000011 ZZH R&B MS OVERFLOW

## 2020-02-06 PROCEDURE — 80048 BASIC METABOLIC PNL TOTAL CA: CPT | Performed by: PHYSICIAN ASSISTANT

## 2020-02-06 PROCEDURE — 25000128 H RX IP 250 OP 636: Performed by: PHYSICIAN ASSISTANT

## 2020-02-06 PROCEDURE — 87040 BLOOD CULTURE FOR BACTERIA: CPT | Performed by: PHYSICIAN ASSISTANT

## 2020-02-06 PROCEDURE — G0378 HOSPITAL OBSERVATION PER HR: HCPCS

## 2020-02-06 PROCEDURE — 96361 HYDRATE IV INFUSION ADD-ON: CPT

## 2020-02-06 PROCEDURE — 83605 ASSAY OF LACTIC ACID: CPT | Performed by: PHYSICIAN ASSISTANT

## 2020-02-06 PROCEDURE — 71250 CT THORAX DX C-: CPT

## 2020-02-06 RX ORDER — SODIUM CHLORIDE, SODIUM LACTATE, POTASSIUM CHLORIDE, CALCIUM CHLORIDE 600; 310; 30; 20 MG/100ML; MG/100ML; MG/100ML; MG/100ML
INJECTION, SOLUTION INTRAVENOUS CONTINUOUS
Status: DISCONTINUED | OUTPATIENT
Start: 2020-02-06 | End: 2020-02-08

## 2020-02-06 RX ORDER — CIPROFLOXACIN 2 MG/ML
400 INJECTION, SOLUTION INTRAVENOUS EVERY 24 HOURS
Status: DISCONTINUED | OUTPATIENT
Start: 2020-02-06 | End: 2020-02-07

## 2020-02-06 RX ADMIN — OMEPRAZOLE 20 MG: 20 CAPSULE, DELAYED RELEASE ORAL at 20:39

## 2020-02-06 RX ADMIN — LORAZEPAM 0.5 MG: 0.5 TABLET ORAL at 02:43

## 2020-02-06 RX ADMIN — METRONIDAZOLE 500 MG: 500 INJECTION, SOLUTION INTRAVENOUS at 13:38

## 2020-02-06 RX ADMIN — VENLAFAXINE 75 MG: 75 TABLET ORAL at 08:34

## 2020-02-06 RX ADMIN — GABAPENTIN 100 MG: 100 CAPSULE ORAL at 08:34

## 2020-02-06 RX ADMIN — VENLAFAXINE 75 MG: 75 TABLET ORAL at 20:40

## 2020-02-06 RX ADMIN — LORAZEPAM 0.5 MG: 0.5 TABLET ORAL at 06:31

## 2020-02-06 RX ADMIN — ACETAMINOPHEN 650 MG: 325 TABLET, FILM COATED ORAL at 08:48

## 2020-02-06 RX ADMIN — CIPROFLOXACIN 400 MG: 2 INJECTION, SOLUTION INTRAVENOUS at 14:45

## 2020-02-06 RX ADMIN — METRONIDAZOLE 500 MG: 500 INJECTION, SOLUTION INTRAVENOUS at 20:42

## 2020-02-06 RX ADMIN — ASPIRIN 81 MG: 81 TABLET, COATED ORAL at 08:34

## 2020-02-06 RX ADMIN — SODIUM CHLORIDE, POTASSIUM CHLORIDE, SODIUM LACTATE AND CALCIUM CHLORIDE: 600; 310; 30; 20 INJECTION, SOLUTION INTRAVENOUS at 11:09

## 2020-02-06 ASSESSMENT — ACTIVITIES OF DAILY LIVING (ADL): WHICH_OF_THE_ABOVE_FUNCTIONAL_RISKS_HAD_A_RECENT_ONSET_OR_CHANGE?: AMBULATION

## 2020-02-06 ASSESSMENT — MIFFLIN-ST. JEOR: SCORE: 1422.12

## 2020-02-06 NOTE — PLAN OF CARE
PRIMARY DIAGNOSIS: SYNCOPE/TIA  OUTPATIENT/OBSERVATION GOALS TO BE MET BEFORE DISCHARGE:  1. Orthostatic performed: Yes:          Lying Orthostatic BP: 128/70         Sitting Orthostatic BP: 121/65         Standing Orthostatic BP: 68/48     2. Diagnostic testing complete & at baseline neurologic testing: No    3. Cleared by consultants (if involved): No    4. Interpretation of cardiac rhythm per telemetry tech: awaiting reading    5. Tolerating adequate PO diet and medications: No    6. Return to near baseline physical activity or neurologic status: No    Discharge Planner Nurse   Safe discharge environment identified: Yes  Barriers to discharge: Yes       Entered by: Jennifer Daley 02/05/2020 6:55 PM     Please review provider order for any additional goals.   Nurse to notify provider when observation goals have been met and patient is ready for discharge.

## 2020-02-06 NOTE — PLAN OF CARE
PRIMARY DIAGNOSIS: SYNCOPE  OUTPATIENT/OBSERVATION GOALS TO BE MET BEFORE DISCHARGE:  1. Orthostatic performed: Yes:          Lying Orthostatic BP: 128/70         Sitting Orthostatic BP: 121/65         Standing Orthostatic BP: 68/48     2. Diagnostic testing complete & at baseline neurologic testing: N/A    3. Cleared by consultants (if involved): N/A    4. Interpretation of cardiac rhythm per telemetry tech: SR 60s    5. Tolerating adequate PO diet and medications: Yes    6. Return to near baseline physical activity or neurologic status: No    Discharge Planner Nurse   Safe discharge environment identified: Yes  Barriers to discharge: Yes       Entered by: Nabil Carrillo 02/05/2020 9:05 PM           VSS.  C/O dizziness when sitting up.  IVF infusing at this time.  Stated nausea is better after antiemetic given earlier.  Up with SBA. Ortho BP to be rechecked after IV bolus.      Please review provider order for any additional goals.   Nurse to notify provider when observation goals have been met and patient is ready for discharge.

## 2020-02-06 NOTE — PLAN OF CARE
PRIMARY DIAGNOSIS: SYNCOPE  OUTPATIENT/OBSERVATION GOALS TO BE MET BEFORE DISCHARGE:  1. Orthostatic performed: Yes:          Lying Orthostatic BP: 177/90         Sitting Orthostatic BP: 150/88         Standing Orthostatic BP: 151/93     2. Diagnostic testing complete & at baseline neurologic testing: N/A    3. Cleared by consultants (if involved): No, social work consult    4. Interpretation of cardiac rhythm per telemetry tech: Sinus rhythm with rates in the low 90's    5. Tolerating adequate PO diet and medications: Yes    6. Return to near baseline physical activity or neurologic status: Yes    Discharge Planner Nurse   Safe discharge environment identified: Yes, pt lives with his wife.  Barriers to discharge: No       Entered by: Joanna Pemberton 02/06/2020 5:03 AM    Pt has slept most of the night, up to the bathroom to void x3 with stand by assist and some redirection.  Pt steady on feet, takes meds without swallowing difficulty.  Orthostatic vitals improved after fluid bolus.  Will continue to monitor.     Please review provider order for any additional goals.   Nurse to notify provider when observation goals have been met and patient is ready for discharge.

## 2020-02-06 NOTE — PLAN OF CARE
ROOM # 213-2      Living Situation (if not independent, order SW consult):na  Facility name:[facility]   : [facility]     Activity level at baseline: Indep  Activity level on admit:SBA      Patient registered to observation; given Patient Bill of Rights; given the opportunity to ask questions about observation status and their plan of care.  Patient has been oriented to the observation room, bathroom and call light is in place.    Discussed discharge goals and expectations with patient/family.

## 2020-02-06 NOTE — PROGRESS NOTES
[facility]  Medicine Progress Note - Hospitalist Service       Date of Admission:  2/5/2020  Assessment & Plan   75 yo gentleman with metastatic esophageal cancer with lung and hilar mets, cachexia of cancer, DM, HTN, recent dx of non-ischemic cardiomyopathy with EF 60>>45% since October 2019 who presented to [facility] of [facility] on 2/5/2020 from [place] [facility] with dizziness and hypotension and was found to have SAW and an elevated lactate.      Dizziness, confusion, and hypotension, concern for infectious encephalopathy vs disease progression  Etiology not yet clear, but increasing concern for systemic infection or even brain mets.  Developed fever overnight to 100.7.  WBC has climbed from 9.5 >> 14.8 overnight with left shift.  Disoriented and clumsy on exam.  Appears to be hallucinating - grabbing at things in the air.  Hypotension could be dehydration vs systemic infection vs progression of known pericardial effusion.   --Blood cultures STAT  --UA STAT  --CT CAP and also CT Head (will not lay still for MRI)  --Change to LR  --Continue to hold antihypertensives  --Discussed situation with wife; patient has as Advanced Directive on file.      SAW on CKD  Baseline Cr 1.6.  On admission, Cr up to 2.97.  Improved only to 2.6 with fluid challenge.  BUN still up so may have ongoing component of dehydration.  May have ATN in setting of prolonged dehydration and await CT CAP to eval for obstruction, less likely.       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  Received neoadjuvant chemoradiation with partial response followed by [place] esophagogastrectomy 11/7/2017.  Surveillance PET/CT 9/4/2018 revealed area in the mediastinum concerning for local recurrence.  EUS 10/22/2018 confirmed metastatic disease.  In Nov 2018, underwent chemoradiation with Xeloda followed by FOLFOX + herceptin.  Then started on maintenance Xeloda +  Herceptin.  PET/CT 1/28/2020 showed progression with increased size and FDG avidity of pulmonary, right neck, and scattered soft tissue mets in addition to mediastinal, hilar, and retroperitoneal lymph nodes.  PET also noted bilateral pleural effusions.  Underwent U/S guided thoracentesis 1/28/2020 which revealed a sterile exudative effusion with cytology positive for metastatic disease.  Stopped chemotherapy recently with plans to transition to immunomodulator.   --Will see what work-up shows.  If progressive disease, will get Onc involved for goals of care discussion.      Recently diagnosed NICM, combined CHF   HTN  Echo 1/14/2020 revealed decreased EF to 45% with G1 LVDD, mild global LV hypokinesis, and small circumferential pericardial effusion.   --Aggressively hydrating in setting of possible systemic infection and SAW.  Does not currently look volume up.    --Holding PTA lisinopril, atenolol, rosuvastatin.     DM  Hold PTA metformin.  Monitor.      Diet: Combination Diet Regular Diet Adult    DVT Prophylaxis: Low Risk/Ambulatory with no VTE prophylaxis indicated  Code Status: Full Code      DISPO:  Work-up ongoing.      BRIANNA Ortiz  Hospitalist Service  [facility]    ______________________________________________________________________    Interval History   Pleasant but impulsive and confused.  Is grabbing at the air and appears to be seeing things that are not there.  Good appetite.  Very clumsy with impairments that appears to extend beyond his baseline peripheral neuropathy.  Febrile overnight.  WBC elevated.      Data reviewed today: I reviewed all medications, new labs and imaging results over the last 24 hours. I personally reviewed no images or EKG's today.    Physical Exam   Vital Signs: Temp: 96.8  F (36  C) Temp src: Oral BP: 108/67 Pulse: 91 Heart Rate: 96 Resp: 22 SpO2: 96 % O2 Device: None (Room air)    Weight: 152 lbs 8 oz    GENERAL:  Pleasant, impulsive, appears  disoriented and confused.  Grabbing at things in the air.   HEENT: Normocephalic, atraumatic.  Extra occular mm intact.  Sclera clear. PERRL.  Mucous membranes dry.   PULMONOLOGY: Clear to auscultation bilaterally   CARDIAC: Regular    ABDOMEN: Soft, appears nontender  MUSCULOSKELETAL:  Moving x 4 spontaneously with CMS intact x4.  Normal bulk and tone. Able to boost himself up in bed.   NEURO: Alert but disoriented.  Trying to drink from beverages with the top still on.  Having trouble grabbing objects due to over-reaching and clumsiness.  Knocking objects off his tray accidentally while reaching for other objects but is able to grab a glass of juice and drink from it without spilling.           Data   Recent Labs   Lab 02/06/20  0613 02/05/20  1439 01/31/20  1409 01/31/20  1335   WBC 14.8* 9.5  --   --    HGB 11.5* 11.7*  --   --    * 108*  --   --     187  --   --    INR  --   --  1.14 Canceled, Test credited, specimen discarded    137  --   --    POTASSIUM 4.8 4.6  --   --    CHLORIDE 112* 108  --   --    CO2 18* 19*  --   --    BUN 53* 54*  --   --    CR 2.60* 2.97*  --   --    ANIONGAP 9 10  --   --    ELIANA 8.8 9.0  --   --    * 203*  --   --    TROPI  --  <0.015  --   --      Recent Results (from the past 24 hour(s))   XR Chest 2 Views    Narrative    CHEST TWO VIEWS 2/5/2020 3:08 PM     HISTORY: Chest pain.    COMPARISON: 1/28/2020    FINDINGS: Right-sided Port-A-Cath with the tip in the SVC. Heart size  is normal. No pneumothorax. There are small bilateral pleural  effusions, decreased in size compared to 1/28/2020. Minor hazy opacity  at the right lung base, likely atelectasis. Heart size normal.      Impression    IMPRESSION: Small bilateral pleural effusions with minor atelectasis  at the right lung base.    SKYE CORONADO MD     Medications     lactated ringers         aspirin  81 mg Oral QAM     gabapentin  100 mg Oral QAM     gabapentin  300 mg Oral Daily     omeprazole  20  mg Oral QPM     sodium chloride (PF)  3 mL Intracatheter Q8H     venlafaxine  75 mg Oral BID

## 2020-02-06 NOTE — PLAN OF CARE
"PRIMARY DIAGNOSIS: SYNCOPE  OUTPATIENT/OBSERVATION GOALS TO BE MET BEFORE DISCHARGE:  1. Orthostatic performed: Yes:          Lying Orthostatic BP: 118/75         Sitting Orthostatic BP: 92/57         Standing Orthostatic BP: 112/78     2. Diagnostic testing complete & at baseline neurologic testing: N/A    3. Cleared by consultants (if involved): N/A    4. Interpretation of cardiac rhythm per telemetry tech: SR, HR 80s.    5. Tolerating adequate PO diet and medications: Yes    6. Return to near baseline physical activity or neurologic status: Yes    Discharge Planner Nurse   Safe discharge environment identified: Yes  Barriers to discharge: Yes       Entered by: Melba Webb 02/06/2020  8:00 AM     /67 (BP Location: Left arm)   Pulse 91   Temp 96.8  F (36  C) (Oral)   Resp 22   Ht 1.753 m (5' 9\")   Wt 69.2 kg (152 lb 8 oz)   SpO2 96%   BMI 22.52 kg/m      A&O to self only. Forgetful and impulsive at times. Orthostatics complete. Up w/ Ax1 overnight, but patient having is difficult standing up this AM, stating he feels dizzy. C/o of RLQ abdominal pain, stated that it \"hurts\" and guarded this area with hands. Given po Tylenol. SL. Continues remote tele: SR, HR in 80s. BS active x4, tolerating regular diet, passing flatus. Voiding in adequate amt.'s. SW consulted. Will continue to monitor.     Please review provider order for any additional goals.   Nurse to notify provider when observation goals have been met and patient is ready for discharge.  "

## 2020-02-06 NOTE — PHARMACY-ADMISSION MEDICATION HISTORY
Admission medication history interview status for this patient is completed to best of my ability. See [facility] admission navigator for allergy information, prior to admission medications and immunization status.     Medication history interview source(s): patient   Medication history resources (including written lists, pill bottles, clinic record): clinic list  Primary pharmacy:[facility]     Changes made to PTA medication list:  Added: Biotene, Compazine, Lomotil, gabapentin 100mg, lorazepam, Ritalin, tramadol, dronabinol  Deleted: Ambien, glipizide   Changed: allopurinol 300mg to 100mg, lisinopril 20mg to 40mg, metformin 1000mg daily to 2000mg daily, venlafaxine ER 75 every day to venlafaxine 75 BID    Actions taken by pharmacist (provider contacted, etc): contacted [facility] pharmacy    Additional medication history information: Patient and patient family unsure when last took doses. Patient really unable to contribute much to the conversation about medications. List was updated to best of my ability using clinic notes, last fill dates and doses from [facility] pharmacy, and fill history available in Epic.     Medication reconciliation/reorder completed by provider prior to medication history?  No     Do you take OTC medications (eg tylenol, ibuprofen, fish oil, eye/ear drops, etc)? Yes      Prior to Admission medications    Medication Sig Last Dose Taking? Auth Provider   gabapentin (NEURONTIN) 100 MG capsule Take 100 mg by mouth every morning  at not started yet Yes Unknown, Entered By History   allopurinol (ZYLOPRIM) 100 MG tablet Take 100 mg by mouth daily  Unknown at Unknown time  Unknown, Entered By History   artificial saliva (BIOTENE MT) SOLN solution 1 application to affected mucosal area 4 to 8 times per day as needed for dry mouth. while awake. Unknown at Unknown time  Unknown, Entered By History   aspirin (ASPIRIN ADULT LOW STRENGTH) 81 MG EC tablet Take 81 mg by mouth every morning Unknown at Unknown time   Unknown, Entered By History   atenolol (TENORMIN) 100 MG tablet Take 1 tablet (100 mg) by mouth daily Unknown at Unknown time  Nilesh Valentino MD   diphenoxylate-atropine (LOMOTIL) 2.5-0.025 MG tablet Take 1 tablet by mouth 4 times daily as needed for diarrhea Unknown at Unknown time  Unknown, Entered By History   dronabinol (MARINOL) 10 MG capsule Take 10 mg by mouth 2 times daily Unknown at Unknown time  Unknown, Entered By History   gabapentin (NEURONTIN) 300 MG capsule Take 300 mg by mouth daily Unknown at Unknown time  Unknown, Entered By History   lisinopril (PRINIVIL/ZESTRIL) 20 MG tablet Take 20 mg by mouth every evening  Unknown at Unknown time  Unknown, Entered By History   LORazepam (ATIVAN) 0.5 MG tablet Take 0.5 mg by mouth every 4 hours Unknown at Unknown time  Unknown, Entered By History   metFORMIN (GLUCOPHAGE) 500 MG tablet Take 2,000 mg by mouth daily (with dinner)  Unknown at Unknown time  Unknown, Entered By History   methylphenidate (RITALIN) 5 MG tablet Take 5-10 mg by mouth daily as needed (in the morning for cancer related fatigue. may repeat at noon as needed) Unknown at Unknown time  Unknown, Entered By History   omeprazole (PRILOSEC) 20 MG CR capsule Take 20 mg by mouth every evening  Unknown at Unknown time  Unknown, Entered By History   prochlorperazine (COMPAZINE) 10 MG tablet Take 10 mg by mouth every 6 hours as needed for nausea Unknown at Unknown time  Unknown, Entered By History   rosuvastatin (CRESTOR) 40 MG tablet Take 40 mg by mouth every evening Unknown at Unknown time  Unknown, Entered By History   traMADol (ULTRAM) 50 MG tablet Take 50 mg by mouth every 6 hours as needed for severe pain Unknown at Unknown time  Unknown, Entered By History   venlafaxine (EFFEXOR) 75 MG tablet Take 75 mg by mouth 2 times daily Unknown at Unknown time  Unknown, Entered By History

## 2020-02-06 NOTE — PROGRESS NOTES
INTERIM NOTE    Vitals stable.  Has been sleeping much of the day.  Pleasant but still confused.  Differential revealed a left shift.  UA bland.  Lactate normal.  Blood cultures drawn, pending.  CT Head WO negative for acute process.  CT CAP WO revealed bilateral small-to-mod pleural effusions with patchy associated infiltrate vs atelectasis, chest and retroperitoneal adenopathy, unchanged indeterminate right adrenal nodule, and moderate diffuse bowel wall thickening involving the cecum and ascending colon.     Discussed with wife and patient had several episodes of loose stools in the days prior to coming in.  None in the last day.  He also has been a bit more confused at home but nothing like he is today.  He has been complaining of some abdominal pain today and on repeat exam reports pain with palpation of the RUQ and epigastrum.     Starting IV cipro/flagyl.  Continue to monitor fever trend.  Hold gabapentin as perhaps it is building up in his system with new SAW.  Continue fluids; watch for s/sx volume overload.      [facility]

## 2020-02-06 NOTE — PLAN OF CARE
"/68 (BP Location: Left arm)   Pulse 78   Temp 97.1  F (36.2  C) (Oral)   Resp 20   Ht 1.753 m (5' 9\")   Wt 69.2 kg (152 lb 8 oz)   SpO2 96%   BMI 22.52 kg/m      Orthostatics        Lying Orthostatic BP: 118/75         Sitting Orthostatic BP: 92/57         Standing Orthostatic BP: 112/78     A&O to self only, sleeping between cares. Forgetful and impulsive at times. Orthostatics complete. Up w/ Ax1 for short distances, but patient had some difficulty standing at times today due to feeling dizzy. C/o of lower abdominal pain, stated that it \"hurts\" and guarded this area with hands. Given po Tylenol. SL. Continues remote tele: SR, HR in 80s. BS active x4, regular diet w/ poor appetite, passing flatus. Straight cath'd at 11:30 AM today for urine sample, 200mL out. DTV. Started on IV Cipro and Flagyl. Gabapentin held. CT of chest, abdomen, pelvis, and head completed. IVF infusing through port (R chest wall). SW consulted. Will continue to monitor.      "

## 2020-02-06 NOTE — H&P
[facility] Outpatient / Observation Unit  History and Physical Exam     Ramon Winter MRN# [id number]   YOB: 1945 Age: 74 year old      Date of Admission:  2/5/2020    Primary care provider: [facility]          Assessment   Ramon Winter is a 74 year old male with a PMH significant for HTN, HLP, DM, GERD and hx of esophageal ca, who presented to the Emergency Room with an episode of syncope.   Work up in the ED reveals: BPs initially low, asymptomatic bradycardia noted, VS otherwise stable. BMP is significant for Cr of 2.97, BUN 54 and glucose of 203. CBC is stable with hgb of 11.7. troponin was undetectable and lactic acid was elevated at 3.2. CXR showed small bilateral pleural effusions with minor atelectasis in the right lung base. EKG sinus bradycardia. He received zofran and compazine in the ED. He also received 1L of IV fluids in the clinic prior to arrival in ED.   Patient will be registered to Observation for further work-up and evaluation.     1. Syncope - due to hypovolemia. Evidence by positive orthostatics, acute on CKD and elevated lactic acid. Has had poor appetite and poor oral intake for several weeks related to oral chemo for esophageal ca, also has continued to take antihypertensives. Will hydrate with IVFs, 1L NS bolus then maintenance fluids. Regular diet as tolerated, recheck orthostatics in AM. Hold home BP meds  2. Acute on CKD - Cr was 1.6 4 months ago, now 2.97. dehydration likely large contributor, will hydrate and recheck BMP in AM. Will hold Lisinopril  3. Lactic acidosis - 3.2 on arrival, due to dehydration, 1.4 on recheck.  4. Esophageal ca - recently on oral chemotherapy, has stopped now. Planning to start immune modulator in a couple weeks, per family. Family brought up supplemental nutrition, recommend deferring that conversation to pt's oncologist as an outpatient.   5. HTN - on Lisinopril and atenolol. Will hold both for now. Consider holding on  discharge if BPs are low/normal, given poor oral intake, he may not require as much antihypertensive.   6. DM - managed with metformin, glucose in ED is 200+. Will recheck a HgbA1c, resume metformin for now  7. GERD - resume PPI         Plan     1. Registered to Observation  2. Continue telemetry monitoring  3. Orthostatic vitals on admission (if not performed already).   4. IV hydration with Normal saline, @, 1000 ml bolus then 100 ml/hr  5. Hold antihypertensives  6. DVT prophylaxis: pt at low risk, encourage ambulation                 Chief Complaint:   Near syncopal/syncopal episode         History of Present Illness:   Ramon Winter is a 74 year old male with a PMH significant for HTN, HLP, DM, GERD and hx of esophageal ca, who presented to the Emergency Room with an episode of syncope. Pt notes feeling weak, poor appetite and near syncopal episodes in the past few weeks. He notes that when he gets up, shortly after, he begins to feel lightheaded and feeling like he's going to pass out. He notes a syncopal episode at home 2 days ago and a near syncopal episode in clinic today. He recently stopped his oral chemotherapy with plans to switch to an immune modulator in a couple weeks. He does note some chest pain and SOB but relates this to previous cancer related surgeries. He denies fever or chills. He notes intermittent nausea, vomiting and diarrhea related to oral chemo.               Past Medical History:     Past Medical History:   Diagnosis Date     Actinic keratosis      Diabetes (H)     dm 2     Elevated C-reactive protein      Esophageal cancer (H)      GERD (gastroesophageal reflux disease)      Gout      Herpes      High cholesterol      Hyperlipidemia      Hypertension      Kidney stone      Low back pain      Osteoarthritis      Osteoarthritis      Other chronic pain     In knees and back     Restless legs syndrome                Past Surgical History:     Past Surgical History:   Procedure  Laterality Date     ENT SURGERY      tonsilectomy     ESOPHAGOGASTRECTOMY N/A 11/7/2017    Procedure: ESOPHAGOGASTRECTOMY;  MARILEE BROOKS ESOPHAGOGASTRECTOMY, PLACEMENT OF FEEDING TUBE JEJUNOSOTMY, PYLOROMYOTOMY, LIGATION THORACIC DUCT;  Surgeon: Maximilian Jeter MD;  Location:  OR     ESOPHAGOSCOPY, GASTROSCOPY, DUODENOSCOPY (EGD), COMBINED N/A 8/2/2017    Procedure: COMBINED ESOPHAGOSCOPY, GASTROSCOPY, DUODENOSCOPY (EGD), BIOPSY SINGLE OR MULTIPLE;  ESOPHAGOSCOPY, GASTROSCOPY, DUODENOSCOPY (EGD) with biopsy;  Surgeon: Jeffery Keene MD;  Location:  GI     ESOPHAGOSCOPY, GASTROSCOPY, DUODENOSCOPY (EGD), COMBINED N/A 8/17/2017    Procedure: COMBINED ENDOSCOPIC ULTRASOUND, ESOPHAGOSCOPY, GASTROSCOPY, DUODENOSCOPY (EGD);  ENDOSCOPIC ULTRASOUND, ESOPHAGOSCOPY, GASTROSCOPY, DUODENOSCOPY (EGD) with fine needle aspiration;  Surgeon: Elizabeth Castro MD;  Location:  OR     ESOPHAGOSCOPY, GASTROSCOPY, DUODENOSCOPY (EGD), COMBINED N/A 10/22/2018    Procedure: ENDOSCOPIC ULTRASOUND, ESOPHAGOSCOPY, GASTROSCOPY, DUODENOSCOPY Fine needle aspiration;  Surgeon: Thai Holloway MD;  Location: [facility] OR     IR CHEST PORT PLACEMENT > 5 YRS OF AGE  12/10/2018     KERATOTOMY ARCUATE WITH FEMTOSECOND LASER/IMAGING FOR ATIOL Left 4/19/2017    Procedure: KERATOTOMY ARCUATE WITH FEMTOSECOND LASER/IMAGING FOR ATIOL;  LEFT EYE FEMTOSECOND LASER CAPSULOTOMY, LENS FRAGMENTATION, ARCUATE INCISIONS, CATARACT INCISIONS  ;  Surgeon: Riley Whatley MD;  Location: [facility]     ORTHOPEDIC SURGERY      right knee     ORTHOPEDIC SURGERY      right shoulder     PHACOEMULSIFICATION CLEAR CORNEA WITH STANDARD INTRAOCULAR LENS IMPLANT Left 4/19/2017    Procedure: PHACOEMULSIFICATION CLEAR CORNEA WITH STANDARD INTRAOCULAR LENS IMPLANT;   COMPLEX FEMTOLASER ASSISTED LEFT EYE PHACOEMULSIFICATION CLEAR CORNEA WITH STANDARD INTRAOCULAR LENS IMPLANT   ;  Surgeon: Riley Whatley MD;  Location: [facility]               Social History:      Social History     Socioeconomic History     Marital status:      Spouse name: Not on file     Number of children: Not on file     Years of education: Not on file     Highest education level: Not on file   Occupational History     Not on file   Social Needs     Financial resource strain: Not on file     Food insecurity:     Worry: Not on file     Inability: Not on file     Transportation needs:     Medical: Not on file     Non-medical: Not on file   Tobacco Use     Smoking status: Former Smoker     Types: Cigarettes     Smokeless tobacco: Never Used     Tobacco comment: quit 35-40 years ago    Substance and Sexual Activity     Alcohol use: No     Comment: quit 35-40 years ago      Drug use: No     Sexual activity: Not on file   Lifestyle     Physical activity:     Days per week: Not on file     Minutes per session: Not on file     Stress: Not on file   Relationships     Social connections:     Talks on phone: Not on file     Gets together: Not on file     Attends Worship service: Not on file     Active member of club or organization: Not on file     Attends meetings of clubs or organizations: Not on file     Relationship status: Not on file     Intimate partner violence:     Fear of current or ex partner: Not on file     Emotionally abused: Not on file     Physically abused: Not on file     Forced sexual activity: Not on file   Other Topics Concern     Parent/sibling w/ CABG, MI or angioplasty before 65F 55M? Not Asked   Social History Narrative     Not on file               Family History:   Reviewed and non contributory         Allergies:    No Known Allergies            Medications:     Prior to Admission medications    Medication Sig Last Dose Taking? Auth Provider   venlafaxine (EFFEXOR) 75 MG tablet Take 75 mg by mouth 2 times daily  Yes Unknown, Entered By History   allopurinol (ZYLOPRIM) 100 MG tablet Take 100 mg by mouth daily  Unknown at Unknown time  Unknown, Entered By History   artificial  saliva (BIOTENE MT) SOLN solution 1 application to affected mucosal area 4 to 8 times per day as needed for dry mouth. while awake.   Unknown, Entered By History   aspirin (ASPIRIN ADULT LOW STRENGTH) 81 MG EC tablet Take 81 mg by mouth every morning Unknown at Unknown time  Unknown, Entered By History   atenolol (TENORMIN) 100 MG tablet Take 1 tablet (100 mg) by mouth daily Unknown at Unknown time  Nilesh Valentino MD   lisinopril (PRINIVIL/ZESTRIL) 20 MG tablet Take 20 mg by mouth every evening  Unknown at Unknown time  Unknown, Entered By History   LORazepam (ATIVAN) 0.5 MG tablet Take 0.5 mg by mouth every 4 hours   Unknown, Entered By History   metFORMIN (GLUCOPHAGE) 500 MG tablet Take 2,000 mg by mouth daily (with dinner)  Unknown at Unknown time  Unknown, Entered By History   omeprazole (PRILOSEC) 20 MG CR capsule Take 20 mg by mouth every evening  Unknown at Unknown time  Unknown, Entered By History   prochlorperazine (COMPAZINE) 10 MG tablet Take 10 mg by mouth every 6 hours as needed for nausea   Unknown, Entered By History   rosuvastatin (CRESTOR) 40 MG tablet Take 40 mg by mouth every evening Unknown at Unknown time  Unknown, Entered By History              Review of Systems:   A Comprehensive greater than 10 system review of systems was carried out.  Pertinent positives and negatives are noted above.  Otherwise negative for contributory information.     CV: NEGATIVE for chest pain, palpitations or peripheral edema  C: NEGATIVE for fever, chills, change in weight  E/M: NEGATIVE for ear, mouth and throat problems  R: NEGATIVE for significant cough or SOB             Physical Exam:   Blood pressure 122/83, pulse (!) 48, resp. rate 17, weight 70.3 kg (155 lb), SpO2 99 %.  ORTHOSTATIC BP: positive     GENERAL:  Comfortable.  PSYCH: pleasant, oriented, No acute distress.  HEENT:  Atraumatic, normocephalic. Normal conjunctiva, normal hearing, and oropharynx is normal. Hoarse voice  NECK:  Supple, no neck  vein distention   HEART:  Normal S1, S2 with no murmur, no pericardial rub, gallops or S3 or S4.  LUNGS:  Clear to auscultation, normal Respiratory effort. No wheezing, rales or ronchi.  GI:  Soft, normal bowel sounds. Diffuse tenderness, non distended.   EXTREMITIES:  No pedal edema, +2 pulses bilateral and equal.  SKIN:  Dry to touch, No rash, wound or ulcerations.  NEUROLOGIC:  CN 2-12 intact, BL 5/5 symmetric upper and lower extremity strength, sensation is intact with no focal deficits.             Data:     EKG demonstrates:  sinus bradycardia, unchanged from previous tracings.    Recent Labs   Lab 02/05/20  1439   WBC 9.5   HGB 11.7*   HCT 35.9*   *        Recent Labs   Lab 02/05/20  1439      POTASSIUM 4.6   CHLORIDE 108   CO2 19*   ANIONGAP 10   *   BUN 54*   CR 2.97*   GFRESTIMATED 20*   GFRESTBLACK 23*   ELIANA 9.0     Recent Labs   Lab 02/05/20  1726 02/05/20  1439   LACT 1.4 3.2*     Recent Labs   Lab 02/05/20  1439   TROPI <0.015       Recent Results (from the past 48 hour(s))   XR Chest 2 Views    Narrative    CHEST TWO VIEWS 2/5/2020 3:08 PM     HISTORY: Chest pain.    COMPARISON: 1/28/2020    FINDINGS: Right-sided Port-A-Cath with the tip in the SVC. Heart size  is normal. No pneumothorax. There are small bilateral pleural  effusions, decreased in size compared to 1/28/2020. Minor hazy opacity  at the right lung base, likely atelectasis. Heart size normal.      Impression    IMPRESSION: Small bilateral pleural effusions with minor atelectasis  at the right lung base.    MD Nery LANDRY PA-C

## 2020-02-07 LAB
ALBUMIN SERPL-MCNC: 2.2 G/DL (ref 3.4–5)
ALP SERPL-CCNC: 82 U/L (ref 40–150)
ALT SERPL W P-5'-P-CCNC: 13 U/L (ref 0–70)
ANION GAP SERPL CALCULATED.3IONS-SCNC: 5 MMOL/L (ref 3–14)
AST SERPL W P-5'-P-CCNC: 12 U/L (ref 0–45)
BASOPHILS # BLD AUTO: 0 10E9/L (ref 0–0.2)
BASOPHILS NFR BLD AUTO: 0.2 %
BILIRUB SERPL-MCNC: 0.5 MG/DL (ref 0.2–1.3)
BUN SERPL-MCNC: 44 MG/DL (ref 7–30)
CALCIUM SERPL-MCNC: 8.5 MG/DL (ref 8.5–10.1)
CHLORIDE SERPL-SCNC: 108 MMOL/L (ref 94–109)
CO2 SERPL-SCNC: 24 MMOL/L (ref 20–32)
CREAT SERPL-MCNC: 1.71 MG/DL (ref 0.66–1.25)
DIFFERENTIAL METHOD BLD: ABNORMAL
EOSINOPHIL # BLD AUTO: 0.1 10E9/L (ref 0–0.7)
EOSINOPHIL NFR BLD AUTO: 1.1 %
ERYTHROCYTE [DISTWIDTH] IN BLOOD BY AUTOMATED COUNT: 17.5 % (ref 10–15)
GFR SERPL CREATININE-BSD FRML MDRD: 39 ML/MIN/{1.73_M2}
GLUCOSE SERPL-MCNC: 112 MG/DL (ref 70–99)
HCT VFR BLD AUTO: 28.8 % (ref 40–53)
HGB BLD-MCNC: 9.5 G/DL (ref 13.3–17.7)
IMM GRANULOCYTES # BLD: 0.1 10E9/L (ref 0–0.4)
IMM GRANULOCYTES NFR BLD: 0.4 %
LYMPHOCYTES # BLD AUTO: 0.7 10E9/L (ref 0.8–5.3)
LYMPHOCYTES NFR BLD AUTO: 5.8 %
MCH RBC QN AUTO: 34.7 PG (ref 26.5–33)
MCHC RBC AUTO-ENTMCNC: 33 G/DL (ref 31.5–36.5)
MCV RBC AUTO: 105 FL (ref 78–100)
MONOCYTES # BLD AUTO: 0.9 10E9/L (ref 0–1.3)
MONOCYTES NFR BLD AUTO: 7.1 %
NEUTROPHILS # BLD AUTO: 10.6 10E9/L (ref 1.6–8.3)
NEUTROPHILS NFR BLD AUTO: 85.4 %
NRBC # BLD AUTO: 0 10*3/UL
NRBC BLD AUTO-RTO: 0 /100
PLATELET # BLD AUTO: 153 10E9/L (ref 150–450)
POTASSIUM SERPL-SCNC: 4.3 MMOL/L (ref 3.4–5.3)
PROT SERPL-MCNC: 5.5 G/DL (ref 6.8–8.8)
RBC # BLD AUTO: 2.74 10E12/L (ref 4.4–5.9)
SODIUM SERPL-SCNC: 137 MMOL/L (ref 133–144)
WBC # BLD AUTO: 12.4 10E9/L (ref 4–11)

## 2020-02-07 PROCEDURE — 25000132 ZZH RX MED GY IP 250 OP 250 PS 637: Performed by: PHYSICIAN ASSISTANT

## 2020-02-07 PROCEDURE — 25000128 H RX IP 250 OP 636: Performed by: PHYSICIAN ASSISTANT

## 2020-02-07 PROCEDURE — 25000128 H RX IP 250 OP 636: Performed by: HOSPITALIST

## 2020-02-07 PROCEDURE — 85025 COMPLETE CBC W/AUTO DIFF WBC: CPT | Performed by: PHYSICIAN ASSISTANT

## 2020-02-07 PROCEDURE — 99233 SBSQ HOSP IP/OBS HIGH 50: CPT | Performed by: PHYSICIAN ASSISTANT

## 2020-02-07 PROCEDURE — 25800030 ZZH RX IP 258 OP 636: Performed by: PHYSICIAN ASSISTANT

## 2020-02-07 PROCEDURE — 12000011 ZZH R&B MS OVERFLOW

## 2020-02-07 PROCEDURE — 80053 COMPREHEN METABOLIC PANEL: CPT | Performed by: PHYSICIAN ASSISTANT

## 2020-02-07 RX ORDER — ATENOLOL 50 MG/1
100 TABLET ORAL DAILY
Status: DISCONTINUED | OUTPATIENT
Start: 2020-02-07 | End: 2020-02-09

## 2020-02-07 RX ORDER — ALLOPURINOL 100 MG/1
100 TABLET ORAL DAILY
Status: DISCONTINUED | OUTPATIENT
Start: 2020-02-07 | End: 2020-02-12 | Stop reason: HOSPADM

## 2020-02-07 RX ORDER — ROSUVASTATIN CALCIUM 20 MG/1
40 TABLET, COATED ORAL EVERY EVENING
Status: DISCONTINUED | OUTPATIENT
Start: 2020-02-07 | End: 2020-02-11

## 2020-02-07 RX ORDER — CIPROFLOXACIN 2 MG/ML
400 INJECTION, SOLUTION INTRAVENOUS EVERY 12 HOURS
Status: DISCONTINUED | OUTPATIENT
Start: 2020-02-07 | End: 2020-02-10

## 2020-02-07 RX ADMIN — SODIUM CHLORIDE, POTASSIUM CHLORIDE, SODIUM LACTATE AND CALCIUM CHLORIDE: 600; 310; 30; 20 INJECTION, SOLUTION INTRAVENOUS at 00:08

## 2020-02-07 RX ADMIN — SODIUM CHLORIDE, POTASSIUM CHLORIDE, SODIUM LACTATE AND CALCIUM CHLORIDE: 600; 310; 30; 20 INJECTION, SOLUTION INTRAVENOUS at 21:09

## 2020-02-07 RX ADMIN — ATENOLOL 100 MG: 50 TABLET ORAL at 12:45

## 2020-02-07 RX ADMIN — ALLOPURINOL 100 MG: 100 TABLET ORAL at 12:45

## 2020-02-07 RX ADMIN — CIPROFLOXACIN 400 MG: 2 INJECTION, SOLUTION INTRAVENOUS at 13:48

## 2020-02-07 RX ADMIN — VENLAFAXINE 75 MG: 75 TABLET ORAL at 08:51

## 2020-02-07 RX ADMIN — ROSUVASTATIN CALCIUM 40 MG: 20 TABLET, FILM COATED ORAL at 19:35

## 2020-02-07 RX ADMIN — OMEPRAZOLE 20 MG: 20 CAPSULE, DELAYED RELEASE ORAL at 19:35

## 2020-02-07 RX ADMIN — VENLAFAXINE 75 MG: 75 TABLET ORAL at 19:35

## 2020-02-07 RX ADMIN — METRONIDAZOLE 500 MG: 500 INJECTION, SOLUTION INTRAVENOUS at 15:05

## 2020-02-07 RX ADMIN — METRONIDAZOLE 500 MG: 500 INJECTION, SOLUTION INTRAVENOUS at 08:45

## 2020-02-07 RX ADMIN — ASPIRIN 81 MG: 81 TABLET, COATED ORAL at 08:50

## 2020-02-07 RX ADMIN — PROCHLORPERAZINE MALEATE 5 MG: 5 TABLET ORAL at 18:10

## 2020-02-07 RX ADMIN — ONDANSETRON 4 MG: 4 TABLET, ORALLY DISINTEGRATING ORAL at 13:16

## 2020-02-07 RX ADMIN — METRONIDAZOLE 500 MG: 500 INJECTION, SOLUTION INTRAVENOUS at 19:35

## 2020-02-07 RX ADMIN — METRONIDAZOLE 500 MG: 500 INJECTION, SOLUTION INTRAVENOUS at 02:05

## 2020-02-07 NOTE — PLAN OF CARE
Pre-syncopal, lower abdominal pain  Vitals:    Temp: 98.8  F (37.1  C) Temp src: Oral BP: 115/67 Pulse: 78 Heart Rate: 74 Resp: 20 SpO2: 92 % O2 Device: None (Room air)   afebrile overnigt  Neuro: A&Ox4, pt answers questions appropriately, however around 0200 pt set bed alarm off and was urinating in a denture cup when the urinal was right next to it. Pt will not use the call light even multiple reminders.   Lungs: WDL-diminished, infrequent cough  Cardiac: Tele: SR  GI: Pain to RUQ and RLQ with movement. No BMs overnight  : WDL  Skin: WDL-scattered bruising   IV: Port with LR at 100mL/hr   Labs: Creat: 2.6, WBC:14.8  Diet: Regular  Pain: 6/10 pain to abdomen, pt declined pain meds  Activity: 1ast with GB, Pt denies dizziness with standing  Plan: Monitor labs, IV flagyl and cipro

## 2020-02-07 NOTE — PROGRESS NOTES
[facility]  Medicine Progress Note - Hospitalist Service       Date of Admission:  2/5/2020  Assessment & Plan   73 yo gentleman with metastatic esophageal cancer with lung and hilar mets, cachexia of cancer, DM, HTN, recent dx of non-ischemic cardiomyopathy with EF 60>>45% since October 2019 who presented to [facility] on 2/5/2020 from [facility] with dizziness and hypotension and was found to have SAW and an elevated lactate.      Colitis with associated infectious encephalopathy   Dizziness, confusion, and hypotension   Started empirically on Cipro/Flagyl yesterday after CT CAP showed moderate diffuse bowel wall thickening involving the cecum and ascending colon.  Afebrile overnight.  WBC trending down.  Encephalopathy appears to be improving but patient is still confused.  Doesn't appear nearly as clumsy and disoriented as yesterday.   --Continue Cipro/Flagyl, day#2  --Trend fever curve and monitor for recurrent hypotension  --Continue to hold PTA gabapentin, marinol, lorazepam, ritalin, and ultram.   --Is currently walking with SBA so will defer PT.  May benefit from [facility]; will get SW involved.      SAW on CKD  Baseline Cr 1.6.  On admission, Cr up to 2.97.  Improved only to 2.6 with fluid challenge.  Fluid challenge continued due to patient's elevated BUN (53) indicating ongoing component of dehydration.  Cr down to 1.71 today.  BUN still up (44) but better. OK to DC IV fluids if patient tolerating PO.      Recently diagnosed NICM, combined CHF   HTN  Echo 1/14/2020 revealed decreased EF to 45% with G1 LVDD, mild global LV hypokinesis, and small circumferential pericardial effusion.   --Pressures improving.  Resume PTA atenolol and rosuvastatin.  Continue to hold lisinopril due to SAW.      DM  Hold PTA metformin.  Monitor.       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  Received neoadjuvant chemoradiation  with partial response followed by Juan Phillip esophagogastrectomy 11/7/2017.  Surveillance PET/CT 9/4/2018 revealed area in the mediastinum concerning for local recurrence.  EUS 10/22/2018 confirmed metastatic disease.  In Nov 2018, underwent chemoradiation with Xeloda followed by FOLFOX + herceptin.  Then started on maintenance Xeloda + Herceptin.  PET/CT 1/28/2020 showed progression with increased size and FDG avidity of pulmonary, right neck, and scattered soft tissue mets in addition to mediastinal, hilar, and retroperitoneal lymph nodes.  PET also noted bilateral pleural effusions.  Underwent U/S guided thoracentesis 1/28/2020 which revealed a sterile exudative effusion with cytology positive for metastatic disease.  Stopped chemotherapy recently with plans to transition to immunomodulator.   --Discussed situation with Dr Jean Baptiste.  Patient has been exhibiting some confusion over the last month but his confusion over the last few days is a marked decompensation from baseline.   --Will defer inpatient Oncology involvement unless patient decompensates and requires goals of care discussion  --Patient and family aware that he will no longer be receiving maintenance chemotherapy but may be placed on immune therapy if his functional status improves.   --Follows with Palliative Care at [facility]    Diet: Combination Diet Regular Diet Adult    DVT Prophylaxis: Low Risk/Ambulatory with no VTE prophylaxis indicated  Code Status: Full Code      DISPO:  Improving.  DC home +/- HHC in 2-3 days.     BRIANNA Ortiz  Hospitalist Service  [facility]    ______________________________________________________________________    Interval History   Doing better today.  Vitals improving (afebrile, BP normalizing).  Up to bathroom with nurse SBA.  Ongoing belly pain but is tolerating PO.  Mental status appears to be improving - is significantly better than yesterday but still not at baseline.         Data reviewed  today: I reviewed all medications, new labs and imaging results over the last 24 hours. I personally reviewed no images or EKG's today.    Physical Exam   Vital Signs: Temp: 97.9  F (36.6  C) Temp src: Oral BP: 129/76 Pulse: 78 Heart Rate: 76 Resp: 16 SpO2: 94 % O2 Device: None (Room air)    Weight: 152 lbs 8 oz    GENERAL:  Pleasant, still mildly impulsive but not nearly as confused or disoriented.  Doesn't appear to be hallucinating as much as yesterday.  A bit cachectic.   HEENT: Normocephalic, atraumatic.  Extra occular mm intact.  Sclera clear. PERRL.  Mucous membranes dry.   PULMONOLOGY: Clear   CARDIAC: RRR  ABDOMEN: Soft, mildly tender in RLQ and epigastrum   MUSCULOSKELETAL:  Moving x 4 spontaneously with CMS intact x4.  Normal bulk and tone.  NEURO: Alert, still a bit disoriented but much better.  Ambulating to bathroom with SBA; mildly stooped forward gate but appears steady on his feet.  Not nearly as clumsy as yesterday.   PSYCH:  Appropriate.       Data   Recent Labs   Lab 02/07/20  0520 02/06/20  0613 02/05/20  1439 01/31/20  1409 01/31/20  1335   WBC 12.4* 14.8* 9.5  --   --    HGB 9.5* 11.5* 11.7*  --   --    * 106* 108*  --   --     170 187  --   --    INR  --   --   --  1.14 Canceled, Test credited, specimen discarded    139 137  --   --    POTASSIUM 4.3 4.8 4.6  --   --    CHLORIDE 108 112* 108  --   --    CO2 24 18* 19*  --   --    BUN 44* 53* 54*  --   --    CR 1.71* 2.60* 2.97*  --   --    ANIONGAP 5 9 10  --   --    ELIANA 8.5 8.8 9.0  --   --    * 160* 203*  --   --    ALBUMIN 2.2*  --   --   --   --    PROTTOTAL 5.5*  --   --   --   --    BILITOTAL 0.5  --   --   --   --    ALKPHOS 82  --   --   --   --    ALT 13  --   --   --   --    AST 12  --   --   --   --    TROPI  --   --  <0.015  --   --      Recent Results (from the past 24 hour(s))   CT Head w/o Contrast    Narrative    CT SCAN OF THE HEAD WITHOUT CONTRAST   2/6/2020 10:55 AM     HISTORY: Dizziness. Evaluate  for brain metastasis.    TECHNIQUE:  Axial images of the head and coronal reformations without  IV contrast material. Radiation dose for this scan was reduced using  automated exposure control, adjustment of the mA and/or kV according  to patient size, or iterative reconstruction technique.    COMPARISON: None.    FINDINGS: There is no evidence of intracranial hemorrhage, mass,  extended signs of acute infarct or anomaly. No definite vasogenic  edema. No mass effect or shift/herniation. The ventricles are normal  in size, shape and configuration. Mild diffuse parenchymal volume  loss. Mild scattered patchy periventricular white matter hypodensities  which are nonspecific, but likely related to chronic microvascular  ischemic disease. Scattered vascular calcifications.    Bilateral lens replacements. Orbits otherwise normal. The visualized  paranasal sinuses are free of significant disease. Trace layering  fluid in the right mastoid tip. There is also probable trace left  mastoid tip opacification. The left mastoid is somewhat relatively  under-pneumatized as compared to the right. The middle ear cavities  are clear. The bony calvarium and bones of the skull base appear  intact.       Impression    IMPRESSION:     1. No evidence of acute intracranial hemorrhage, mass, or herniation.  2. Diffuse parenchymal volume loss and white matter changes likely due  to chronic microvascular ischemic disease.   3. Bilateral mastoid tip fluid.    If there is persistent clinical concern for intracranial metastatic  disease, MRI brain without and with contrast would be more sensitive  for the detection of small intracranial metastases.    KEANU SIMEON MD   CT Chest Abdomen Pelvis w/o Contrast    Narrative    CT CHEST, ABDOMEN AND PELVIS WITHOUT CONTRAST   2/6/2020 10:57 AM     HISTORY: Fever. Presyncope. Weakness. Recent chemotherapy. History of  esophageal cancer.    TECHNIQUE: Volumetric helical sections were acquired from the  thoracic  inlet to the ischial tuberosities without IV contrast. Coronal images  were also reconstructed. Radiation dose for this scan was reduced  using automated exposure control, adjustment of the mA and/or kV  according to patient size, or iterative reconstruction technique.    COMPARISON: PET/CT performed 1/28/2020.    FINDINGS:    Chest: Small-to-moderate bilateral pleural effusions are not  significantly changed. Mild patchy associated infiltrate or  atelectasis in both lower lungs posteriorly as well as within the left  upper lobe laterally, also unchanged. Hazy nodular opacities in both  lungs are also unchanged. Postoperative changes of esophagectomy and  gastric pull-through are again noted. Right Port-A-Cath, with tip in  the low SVC. Atherosclerotic calcification of the thoracic aorta and  coronary arteries. Mild mediastinal and bilateral hilar adenopathy is  not significantly changed. For example, an enlarged lymph node in the  left paratracheal region (series 5 image 45) measures 2.4 x 1.5 cm,  and is not significantly changed. No pericardial effusion. No  pneumothorax.     Abdomen and Pelvis: Indeterminate 1.6 cm right adrenal nodule is  unchanged. The liver, gallbladder, spleen, adrenal glands, pancreas,  and kidneys have otherwise unremarkable noncontrast appearances. No  hydronephrosis. Mild atherosclerotic aortoiliac calcification.  Moderate diffuse bowel wall thickening involving the cecum and  ascending colon is new since the previous exam, and likely represents  an infectious or inflammatory colitis. No bowel obstruction. Trace  amount of nonspecific free fluid in the pelvis. There is marked  prostatic enlargement with mild central prostatic calcification. Mild  retroperitoneal adenopathy is not significantly changed. For example,  a borderline-enlarged right para-aortic lymph node (series 5 image  175) measures 1.8 x 1 cm. Degenerative changes are noted throughout  the thoracolumbar spine.       Impression    IMPRESSION:   1. Moderate diffuse bowel wall thickening involving the cecum and  ascending colon likely represents an infectious or inflammatory  colitis. Neutropenic colitis could have this appearance.  2. Small-to-moderate bilateral pleural effusions and patchy associated  infiltrate and/or atelectasis in both lungs are unchanged.  3. Mild mediastinal, bilateral hilar, and retroperitoneal adenopathy,  as well as scattered hazy nodular opacities in both lungs, are not  significantly changed, and remain suspicious for metastatic  involvement.  4. Indeterminate right adrenal nodule is unchanged, and is also  suspicious for metastatic disease.  5. Marked prostatic enlargement.    WILLIE SUTTON MD     Medications     lactated ringers 100 mL/hr at 02/07/20 0008       aspirin  81 mg Oral QAM     ciprofloxacin  400 mg Intravenous Q24H     metroNIDAZOLE  500 mg Intravenous Q6H     omeprazole  20 mg Oral QPM     sodium chloride (PF)  3 mL Intracatheter Q8H     venlafaxine  75 mg Oral BID

## 2020-02-08 LAB
ANION GAP SERPL CALCULATED.3IONS-SCNC: 6 MMOL/L (ref 3–14)
BASOPHILS # BLD AUTO: 0 10E9/L (ref 0–0.2)
BASOPHILS NFR BLD AUTO: 0.2 %
BUN SERPL-MCNC: 30 MG/DL (ref 7–30)
CALCIUM SERPL-MCNC: 8.6 MG/DL (ref 8.5–10.1)
CHLORIDE SERPL-SCNC: 106 MMOL/L (ref 94–109)
CO2 SERPL-SCNC: 24 MMOL/L (ref 20–32)
CREAT SERPL-MCNC: 1.38 MG/DL (ref 0.66–1.25)
DIFFERENTIAL METHOD BLD: ABNORMAL
EOSINOPHIL # BLD AUTO: 0.2 10E9/L (ref 0–0.7)
EOSINOPHIL NFR BLD AUTO: 2.6 %
ERYTHROCYTE [DISTWIDTH] IN BLOOD BY AUTOMATED COUNT: 16.9 % (ref 10–15)
GFR SERPL CREATININE-BSD FRML MDRD: 50 ML/MIN/{1.73_M2}
GLUCOSE SERPL-MCNC: 100 MG/DL (ref 70–99)
HCT VFR BLD AUTO: 33.6 % (ref 40–53)
HGB BLD-MCNC: 11.4 G/DL (ref 13.3–17.7)
IMM GRANULOCYTES # BLD: 0.1 10E9/L (ref 0–0.4)
IMM GRANULOCYTES NFR BLD: 0.6 %
LYMPHOCYTES # BLD AUTO: 0.4 10E9/L (ref 0.8–5.3)
LYMPHOCYTES NFR BLD AUTO: 4.7 %
MCH RBC QN AUTO: 35.1 PG (ref 26.5–33)
MCHC RBC AUTO-ENTMCNC: 33.9 G/DL (ref 31.5–36.5)
MCV RBC AUTO: 103 FL (ref 78–100)
MONOCYTES # BLD AUTO: 0.6 10E9/L (ref 0–1.3)
MONOCYTES NFR BLD AUTO: 7.4 %
NEUTROPHILS # BLD AUTO: 6.8 10E9/L (ref 1.6–8.3)
NEUTROPHILS NFR BLD AUTO: 84.5 %
NRBC # BLD AUTO: 0 10*3/UL
NRBC BLD AUTO-RTO: 0 /100
PLATELET # BLD AUTO: 140 10E9/L (ref 150–450)
POTASSIUM SERPL-SCNC: 3.9 MMOL/L (ref 3.4–5.3)
RBC # BLD AUTO: 3.25 10E12/L (ref 4.4–5.9)
SODIUM SERPL-SCNC: 136 MMOL/L (ref 133–144)
WBC # BLD AUTO: 8.1 10E9/L (ref 4–11)

## 2020-02-08 PROCEDURE — 99233 SBSQ HOSP IP/OBS HIGH 50: CPT | Performed by: PHYSICIAN ASSISTANT

## 2020-02-08 PROCEDURE — 85025 COMPLETE CBC W/AUTO DIFF WBC: CPT | Performed by: PHYSICIAN ASSISTANT

## 2020-02-08 PROCEDURE — 25000132 ZZH RX MED GY IP 250 OP 250 PS 637: Performed by: PHYSICIAN ASSISTANT

## 2020-02-08 PROCEDURE — 12000011 ZZH R&B MS OVERFLOW

## 2020-02-08 PROCEDURE — 25800030 ZZH RX IP 258 OP 636: Performed by: PHYSICIAN ASSISTANT

## 2020-02-08 PROCEDURE — 25000128 H RX IP 250 OP 636: Performed by: PHYSICIAN ASSISTANT

## 2020-02-08 PROCEDURE — 25000128 H RX IP 250 OP 636: Performed by: HOSPITALIST

## 2020-02-08 PROCEDURE — 80048 BASIC METABOLIC PNL TOTAL CA: CPT | Performed by: PHYSICIAN ASSISTANT

## 2020-02-08 RX ORDER — HEPARIN SODIUM (PORCINE) LOCK FLUSH IV SOLN 100 UNIT/ML 100 UNIT/ML
5 SOLUTION INTRAVENOUS
Status: DISCONTINUED | OUTPATIENT
Start: 2020-02-08 | End: 2020-02-12 | Stop reason: HOSPADM

## 2020-02-08 RX ORDER — GABAPENTIN 300 MG/1
300 CAPSULE ORAL AT BEDTIME
Status: DISCONTINUED | OUTPATIENT
Start: 2020-02-08 | End: 2020-02-11

## 2020-02-08 RX ORDER — GABAPENTIN 100 MG/1
100 CAPSULE ORAL EVERY MORNING
Status: DISCONTINUED | OUTPATIENT
Start: 2020-02-09 | End: 2020-02-12 | Stop reason: HOSPADM

## 2020-02-08 RX ORDER — DRONABINOL 2.5 MG/1
10 CAPSULE ORAL
Status: DISCONTINUED | OUTPATIENT
Start: 2020-02-08 | End: 2020-02-12 | Stop reason: HOSPADM

## 2020-02-08 RX ORDER — HEPARIN SODIUM,PORCINE 10 UNIT/ML
5-10 VIAL (ML) INTRAVENOUS
Status: DISCONTINUED | OUTPATIENT
Start: 2020-02-08 | End: 2020-02-12 | Stop reason: HOSPADM

## 2020-02-08 RX ORDER — HEPARIN SODIUM,PORCINE 10 UNIT/ML
5-10 VIAL (ML) INTRAVENOUS EVERY 24 HOURS
Status: DISCONTINUED | OUTPATIENT
Start: 2020-02-08 | End: 2020-02-12 | Stop reason: HOSPADM

## 2020-02-08 RX ORDER — PROCHLORPERAZINE MALEATE 5 MG
5 TABLET ORAL 3 TIMES DAILY
Status: DISCONTINUED | OUTPATIENT
Start: 2020-02-08 | End: 2020-02-11

## 2020-02-08 RX ADMIN — VENLAFAXINE 75 MG: 75 TABLET ORAL at 19:57

## 2020-02-08 RX ADMIN — CIPROFLOXACIN 400 MG: 2 INJECTION, SOLUTION INTRAVENOUS at 01:42

## 2020-02-08 RX ADMIN — METRONIDAZOLE 500 MG: 500 INJECTION, SOLUTION INTRAVENOUS at 02:55

## 2020-02-08 RX ADMIN — METRONIDAZOLE 500 MG: 500 INJECTION, SOLUTION INTRAVENOUS at 09:42

## 2020-02-08 RX ADMIN — ATENOLOL 100 MG: 50 TABLET ORAL at 08:55

## 2020-02-08 RX ADMIN — METRONIDAZOLE 500 MG: 500 INJECTION, SOLUTION INTRAVENOUS at 16:11

## 2020-02-08 RX ADMIN — ONDANSETRON 4 MG: 4 TABLET, ORALLY DISINTEGRATING ORAL at 22:16

## 2020-02-08 RX ADMIN — HEPARIN, PORCINE (PF) 10 UNIT/ML INTRAVENOUS SYRINGE 5 ML: at 17:52

## 2020-02-08 RX ADMIN — METRONIDAZOLE 500 MG: 500 INJECTION, SOLUTION INTRAVENOUS at 22:11

## 2020-02-08 RX ADMIN — PROCHLORPERAZINE EDISYLATE 5 MG: 5 INJECTION INTRAMUSCULAR; INTRAVENOUS at 11:52

## 2020-02-08 RX ADMIN — DRONABINOL 10 MG: 2.5 CAPSULE ORAL at 16:07

## 2020-02-08 RX ADMIN — PROCHLORPERAZINE MALEATE 5 MG: 5 TABLET ORAL at 19:57

## 2020-02-08 RX ADMIN — ROSUVASTATIN CALCIUM 40 MG: 20 TABLET, FILM COATED ORAL at 19:57

## 2020-02-08 RX ADMIN — ONDANSETRON HYDROCHLORIDE 4 MG: 2 INJECTION, SOLUTION INTRAMUSCULAR; INTRAVENOUS at 13:58

## 2020-02-08 RX ADMIN — CIPROFLOXACIN 400 MG: 2 INJECTION, SOLUTION INTRAVENOUS at 13:58

## 2020-02-08 RX ADMIN — VENLAFAXINE 75 MG: 75 TABLET ORAL at 08:55

## 2020-02-08 RX ADMIN — GABAPENTIN 300 MG: 300 CAPSULE ORAL at 19:58

## 2020-02-08 RX ADMIN — ALLOPURINOL 100 MG: 100 TABLET ORAL at 08:56

## 2020-02-08 RX ADMIN — ASPIRIN 81 MG: 81 TABLET, COATED ORAL at 08:55

## 2020-02-08 RX ADMIN — OMEPRAZOLE 20 MG: 20 CAPSULE, DELAYED RELEASE ORAL at 19:57

## 2020-02-08 RX ADMIN — PROCHLORPERAZINE MALEATE 5 MG: 5 TABLET ORAL at 16:08

## 2020-02-08 RX ADMIN — SODIUM CHLORIDE, POTASSIUM CHLORIDE, SODIUM LACTATE AND CALCIUM CHLORIDE: 600; 310; 30; 20 INJECTION, SOLUTION INTRAVENOUS at 13:58

## 2020-02-08 NOTE — PLAN OF CARE
Patient alert, disoriented to time. Vitals are Temp: 97.6  F (36.4  C) Temp src: Oral BP: 125/60   Heart Rate: 67 Resp: 18 SpO2: 95 % RA. Denies pain. 1/2 NS infusing at 100 ml/hr. Tolerating regular diet. Denies dizziness. Tele SR with PVCs 70-80s. Infrequent cough. Voiding without difficulty. Plan to assist with supportive cares and symptom management.

## 2020-02-08 NOTE — PLAN OF CARE
"Vitals: /67 (BP Location: Right arm)   Pulse 78   Temp 95.9  F (35.5  C) (Oral)   Resp 16   Ht 1.753 m (5' 9\")   Wt 69.2 kg (152 lb 8 oz)   SpO2 97%   BMI 22.52 kg/m    Situation/Status:Colitis with associated infectious encephalopathy   Dizziness, confusion, and hypotension, SAW.  Neuro: A/O x 4, confused at times.  Pain: c/o abdominal pain, did not want med.  Activity: SBA w/ walker.  Diet: Reg diet  Tele/Cardiac: NSR  Lungs: [id number]  GI/: c/o of nausea/vomiting, + Flautus, BS x4. taking compazine/zofran for nausea.  Skin: CMS intact, edema  Dressings: CDI  Lines/drains:  ml/hr LR, Cipro/Flagyl,   Labs:Results for BETZAIDAGALINDO SHANE ARVIND (MRN [id number]) as of 2/8/2020 14:33   Ref. Range 2/7/2020 05:20 2/8/2020 09:45   Creatinine Latest Ref Range: 0.66 - 1.25 mg/dL 1.71 (H) 1.38 (H)     Plan: Continue to monitor per POC.    "

## 2020-02-08 NOTE — PLAN OF CARE
A&O x 4, forgetful. C/O abdominal discomfort, denies need for intervention. Zofran and compazine for nausea. Decreased appetite. VSS. NSR on tele. Port with LR at 100 ml/hr between ABX.

## 2020-02-08 NOTE — PROGRESS NOTES
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     #Colitis with associated infectious encephalopathy  #Dizziness, confusion, and hypotension: started empirically on Cipro/Flagyl on 2/7 after CT CAP showed moderate diffuse bowel wall thickening involving the cecum and ascending colon. Continues to be afebrile with normalization of WBC tonight. Encephalopathy appears to be improving and he is closer to baseline today per daughter at bedside.   - Continue Cipro/Flagyl, day#3  - Monitor for recurrent fevers   - Trial resuming gabapentin, marinol, and tramadol with monitoring for increased confusion  - Continue to hold PTA lorazepam and ritalin   - Appears near baseline for mobility at this time, no need for PT currently       #SAW on CKD: baseline Cr 1.6.  On admission, Cr up to 2.97 with improvement down to 1.38 today and normalization of BUN.   - Stop IVFs  - Trial PO challenge and encourage patient to drink water  - Recheck BMP in AM     #Recently diagnosed NICM, combined CHF  #HTN: Echo 1/14/2020 revealed decreased EF to 45% with G1 LVDD, mild global LV hypokinesis, and small circumferential pericardial effusion.   - BP stable, resume PTA atenolol and rosuvastatin.  Continue to hold lisinopril due to SAW.      #DM: blood glucose in the 100s over the last day, continue to hold PTA metformin.  Monitor.       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  Received  neoadjuvant chemoradiation with partial response followed by Juan Phillip esophagogastrectomy 11/7/2017.  Surveillance PET/CT 9/4/2018 revealed area in the mediastinum concerning for local recurrence.  EUS 10/22/2018 confirmed metastatic disease.  In Nov 2018, underwent chemoradiation with Xeloda followed by FOLFOX + herceptin.  Then started on maintenance Xeloda + Herceptin.  PET/CT 1/28/2020 showed progression with increased size and FDG avidity of pulmonary, right neck, and scattered soft tissue mets in addition to mediastinal, hilar, and retroperitoneal lymph nodes.  PET also noted bilateral pleural effusions.  Underwent U/S guided thoracentesis 1/28/2020 which revealed a sterile exudative effusion with cytology positive for metastatic disease.  Stopped chemotherapy recently with plans to transition to immunomodulator.   - Case has been discussed with Dr Jean Baptiste this admission.  Patient has been exhibiting some confusion over the last month but his confusion on admission was markedly decompensated from baseline.   - Will defer inpatient Oncology involvement unless patient decompensates and requires goals of care discussion  - Patient and family aware that he will no longer be receiving maintenance chemotherapy but may be placed on immune therapy if his functional status improves.   - Follows with Palliative Care at [facility] (will likely need to rescheduled upcoming appointment on 2/12)    DVT Prophylaxis: Pneumatic Compression Devices  Code Status: Full Code  Disposition: Expected discharge likely tomorrow if tolerating adequate PO intake and no recurrent confusion.     Interval History   Patient appears mildly disoriented today but per patient and family continuing to feel better. He is slightly dizzy when up ambulating. He reports ongoing chronic nausea with decreased appetite. He states he is just tired today. He has chronic centralized abdominal pain with acute pain that is worse in his right lower abdomen  that is still present today. He reports a normal BM this morning. Denies nausea, diarrhea, chest pain, or shortness of breath.     -Data reviewed today: I reviewed all new labs and imaging reports over the last 24 hours.    Physical Exam   Temp: 95.9  F (35.5  C) Temp src: Oral BP: 126/67   Heart Rate: 68 Resp: 16 SpO2: 97 % O2 Device: None (Room air)    Vitals:    02/05/20 1357 02/05/20 1805 02/06/20 0840   Weight: 70.3 kg (155 lb) 67.8 kg (149 lb 6.4 oz) 69.2 kg (152 lb 8 oz)     Vital Signs with Ranges  Temp:  [95.9  F (35.5  C)-98.3  F (36.8  C)] 95.9  F (35.5  C)  Heart Rate:  [59-76] 68  Resp:  [16-18] 16  BP: (108-137)/(60-83) 126/67  SpO2:  [93 %-97 %] 97 %  I/O last 3 completed shifts:  In: 1226 [I.V.:1226]  Out: 225 [Urine:225]    GEN:  Alert, oriented x 3 and mildly confused at times, appears comfortable, NAD.  HEENT:  Normocephalic/atraumatic, no scleral icterus, no nasal discharge, mouth moist.  CV:  Regular rate and rhythm, no murmur or JVD.  S1 + S2 noted, no S3 or S4.  LUNGS:  Clear to auscultation bilaterally without rales/rhonchi/wheezing/retractions.  Symmetric chest rise on inhalation noted.  ABD:  Active bowel sounds, soft, milder tenderness to RLQ and epigastrium, non-distended.  No rebound/guarding/rigidity.  EXT: Moving all extremities equally and spontaneously. No edema.  No cyanosis.  No acute joint synovitis noted.  SKIN:  Dry to touch, no exanthems noted in the visualized areas.  NEURO: mildly disoriented but continues to improve per family    Medications       allopurinol  100 mg Oral Daily     aspirin  81 mg Oral QAM     atenolol  100 mg Oral Daily     ciprofloxacin  400 mg Intravenous Q12H     dronabinol  10 mg Oral BID AC     [START ON 2/9/2020] gabapentin  100 mg Oral QAM     gabapentin  300 mg Oral At Bedtime     metroNIDAZOLE  500 mg Intravenous Q6H     omeprazole  20 mg Oral QPM     prochlorperazine  10 mg Oral TID     rosuvastatin  40 mg Oral QPM     sodium chloride (PF)  3 mL  Intracatheter Q8H     venlafaxine  75 mg Oral BID     Data   Results for orders placed or performed during the hospital encounter of 02/05/20   XR Chest 2 Views     Status: None    Narrative    CHEST TWO VIEWS 2/5/2020 3:08 PM     HISTORY: Chest pain.    COMPARISON: 1/28/2020    FINDINGS: Right-sided Port-A-Cath with the tip in the SVC. Heart size  is normal. No pneumothorax. There are small bilateral pleural  effusions, decreased in size compared to 1/28/2020. Minor hazy opacity  at the right lung base, likely atelectasis. Heart size normal.      Impression    IMPRESSION: Small bilateral pleural effusions with minor atelectasis  at the right lung base.    SKYE CORONADO MD   CT Chest Abdomen Pelvis w/o Contrast     Status: None    Narrative    CT CHEST, ABDOMEN AND PELVIS WITHOUT CONTRAST   2/6/2020 10:57 AM     HISTORY: Fever. Presyncope. Weakness. Recent chemotherapy. History of  esophageal cancer.    TECHNIQUE: Volumetric helical sections were acquired from the thoracic  inlet to the ischial tuberosities without IV contrast. Coronal images  were also reconstructed. Radiation dose for this scan was reduced  using automated exposure control, adjustment of the mA and/or kV  according to patient size, or iterative reconstruction technique.    COMPARISON: PET/CT performed 1/28/2020.    FINDINGS:    Chest: Small-to-moderate bilateral pleural effusions are not  significantly changed. Mild patchy associated infiltrate or  atelectasis in both lower lungs posteriorly as well as within the left  upper lobe laterally, also unchanged. Hazy nodular opacities in both  lungs are also unchanged. Postoperative changes of esophagectomy and  gastric pull-through are again noted. Right Port-A-Cath, with tip in  the low SVC. Atherosclerotic calcification of the thoracic aorta and  coronary arteries. Mild mediastinal and bilateral hilar adenopathy is  not significantly changed. For example, an enlarged lymph node in the  left  paratracheal region (series 5 image 45) measures 2.4 x 1.5 cm,  and is not significantly changed. No pericardial effusion. No  pneumothorax.     Abdomen and Pelvis: Indeterminate 1.6 cm right adrenal nodule is  unchanged. The liver, gallbladder, spleen, adrenal glands, pancreas,  and kidneys have otherwise unremarkable noncontrast appearances. No  hydronephrosis. Mild atherosclerotic aortoiliac calcification.  Moderate diffuse bowel wall thickening involving the cecum and  ascending colon is new since the previous exam, and likely represents  an infectious or inflammatory colitis. No bowel obstruction. Trace  amount of nonspecific free fluid in the pelvis. There is marked  prostatic enlargement with mild central prostatic calcification. Mild  retroperitoneal adenopathy is not significantly changed. For example,  a borderline-enlarged right para-aortic lymph node (series 5 image  175) measures 1.8 x 1 cm. Degenerative changes are noted throughout  the thoracolumbar spine.      Impression    IMPRESSION:   1. Moderate diffuse bowel wall thickening involving the cecum and  ascending colon likely represents an infectious or inflammatory  colitis. Neutropenic colitis could have this appearance.  2. Small-to-moderate bilateral pleural effusions and patchy associated  infiltrate and/or atelectasis in both lungs are unchanged.  3. Mild mediastinal, bilateral hilar, and retroperitoneal adenopathy,  as well as scattered hazy nodular opacities in both lungs, are not  significantly changed, and remain suspicious for metastatic  involvement.  4. Indeterminate right adrenal nodule is unchanged, and is also  suspicious for metastatic disease.  5. Marked prostatic enlargement.    WILLIE SUTTON MD   CT Head w/o Contrast     Status: None    Narrative    CT SCAN OF THE HEAD WITHOUT CONTRAST   2/6/2020 10:55 AM     HISTORY: Dizziness. Evaluate for brain metastasis.    TECHNIQUE:  Axial images of the head and coronal reformations  without  IV contrast material. Radiation dose for this scan was reduced using  automated exposure control, adjustment of the mA and/or kV according  to patient size, or iterative reconstruction technique.    COMPARISON: None.    FINDINGS: There is no evidence of intracranial hemorrhage, mass,  extended signs of acute infarct or anomaly. No definite vasogenic  edema. No mass effect or shift/herniation. The ventricles are normal  in size, shape and configuration. Mild diffuse parenchymal volume  loss. Mild scattered patchy periventricular white matter hypodensities  which are nonspecific, but likely related to chronic microvascular  ischemic disease. Scattered vascular calcifications.    Bilateral lens replacements. Orbits otherwise normal. The visualized  paranasal sinuses are free of significant disease. Trace layering  fluid in the right mastoid tip. There is also probable trace left  mastoid tip opacification. The left mastoid is somewhat relatively  under-pneumatized as compared to the right. The middle ear cavities  are clear. The bony calvarium and bones of the skull base appear  intact.       Impression    IMPRESSION:     1. No evidence of acute intracranial hemorrhage, mass, or herniation.  2. Diffuse parenchymal volume loss and white matter changes likely due  to chronic microvascular ischemic disease.   3. Bilateral mastoid tip fluid.    If there is persistent clinical concern for intracranial metastatic  disease, MRI brain without and with contrast would be more sensitive  for the detection of small intracranial metastases.    KEANU SIMEON MD   CBC with platelets differential     Status: Abnormal   Result Value Ref Range    WBC 9.5 4.0 - 11.0 10e9/L    RBC Count 3.32 (L) 4.4 - 5.9 10e12/L    Hemoglobin 11.7 (L) 13.3 - 17.7 g/dL    Hematocrit 35.9 (L) 40.0 - 53.0 %     (H) 78 - 100 fl    MCH 35.2 (H) 26.5 - 33.0 pg    MCHC 32.6 31.5 - 36.5 g/dL    RDW 17.2 (H) 10.0 - 15.0 %    Platelet Count 187  150 - 450 10e9/L    Diff Method Automated Method     % Neutrophils 77.0 %    % Lymphocytes 10.5 %    % Monocytes 9.5 %    % Eosinophils 1.9 %    % Basophils 0.6 %    % Immature Granulocytes 0.5 %    Nucleated RBCs 0 0 /100    Absolute Neutrophil 7.3 1.6 - 8.3 10e9/L    Absolute Lymphocytes 1.0 0.8 - 5.3 10e9/L    Absolute Monocytes 0.9 0.0 - 1.3 10e9/L    Absolute Eosinophils 0.2 0.0 - 0.7 10e9/L    Absolute Basophils 0.1 0.0 - 0.2 10e9/L    Abs Immature Granulocytes 0.1 0 - 0.4 10e9/L    Absolute Nucleated RBC 0.0    Basic metabolic panel     Status: Abnormal   Result Value Ref Range    Sodium 137 133 - 144 mmol/L    Potassium 4.6 3.4 - 5.3 mmol/L    Chloride 108 94 - 109 mmol/L    Carbon Dioxide 19 (L) 20 - 32 mmol/L    Anion Gap 10 3 - 14 mmol/L    Glucose 203 (H) 70 - 99 mg/dL    Urea Nitrogen 54 (H) 7 - 30 mg/dL    Creatinine 2.97 (H) 0.66 - 1.25 mg/dL    GFR Estimate 20 (L) >60 mL/min/[1.73_m2]    GFR Estimate If Black 23 (L) >60 mL/min/[1.73_m2]    Calcium 9.0 8.5 - 10.1 mg/dL   Lactic acid whole blood     Status: Abnormal   Result Value Ref Range    Lactic Acid 3.2 (H) 0.7 - 2.0 mmol/L   Troponin I     Status: None   Result Value Ref Range    Troponin I ES <0.015 0.000 - 0.045 ug/L   Lactic acid whole blood     Status: None   Result Value Ref Range    Lactic Acid 1.4 0.7 - 2.0 mmol/L   Hemoglobin A1c     Status: Abnormal   Result Value Ref Range    Hemoglobin A1C 6.4 (H) 0 - 5.6 %   Basic metabolic panel     Status: Abnormal   Result Value Ref Range    Sodium 139 133 - 144 mmol/L    Potassium 4.8 3.4 - 5.3 mmol/L    Chloride 112 (H) 94 - 109 mmol/L    Carbon Dioxide 18 (L) 20 - 32 mmol/L    Anion Gap 9 3 - 14 mmol/L    Glucose 160 (H) 70 - 99 mg/dL    Urea Nitrogen 53 (H) 7 - 30 mg/dL    Creatinine 2.60 (H) 0.66 - 1.25 mg/dL    GFR Estimate 23 (L) >60 mL/min/[1.73_m2]    GFR Estimate If Black 27 (L) >60 mL/min/[1.73_m2]    Calcium 8.8 8.5 - 10.1 mg/dL   CBC with platelets     Status: Abnormal   Result Value  Ref Range    WBC 14.8 (H) 4.0 - 11.0 10e9/L    RBC Count 3.34 (L) 4.4 - 5.9 10e12/L    Hemoglobin 11.5 (L) 13.3 - 17.7 g/dL    Hematocrit 35.4 (L) 40.0 - 53.0 %     (H) 78 - 100 fl    MCH 34.4 (H) 26.5 - 33.0 pg    MCHC 32.5 31.5 - 36.5 g/dL    RDW 17.4 (H) 10.0 - 15.0 %    Platelet Count 170 150 - 450 10e9/L   Lactic acid whole blood     Status: None   Result Value Ref Range    Lactic Acid 1.1 0.7 - 2.0 mmol/L   WBC Differential     Status: Abnormal   Result Value Ref Range    Diff Method Automated Method     % Neutrophils 89.7 %    % Lymphocytes 4.1 %    % Monocytes 5.5 %    % Eosinophils 0.0 %    % Basophils 0.2 %    % Immature Granulocytes 0.5 %    Nucleated RBCs 0 0 /100    Absolute Neutrophil 13.2 (H) 1.6 - 8.3 10e9/L    Absolute Lymphocytes 0.6 (L) 0.8 - 5.3 10e9/L    Absolute Monocytes 0.8 0.0 - 1.3 10e9/L    Absolute Eosinophils 0.0 0.0 - 0.7 10e9/L    Absolute Basophils 0.0 0.0 - 0.2 10e9/L    Abs Immature Granulocytes 0.1 0 - 0.4 10e9/L    Absolute Nucleated RBC 0.0    UA with Microscopic reflex to Culture     Status: Abnormal   Result Value Ref Range    Color Urine Light Yellow     Appearance Urine Clear     Glucose Urine Negative NEG^Negative mg/dL    Bilirubin Urine Negative NEG^Negative    Ketones Urine Negative NEG^Negative mg/dL    Specific Gravity Urine 1.020 1.003 - 1.035    Blood Urine Trace (A) NEG^Negative    pH Urine 5.0 5.0 - 7.0 pH    Protein Albumin Urine 30 (A) NEG^Negative mg/dL    Urobilinogen mg/dL Normal 0.0 - 2.0 mg/dL    Nitrite Urine Negative NEG^Negative    Leukocyte Esterase Urine Negative NEG^Negative    Source Midstream Urine     WBC Urine 1 0 - 5 /HPF    RBC Urine 1 0 - 2 /HPF    Mucous Urine Present (A) NEG^Negative /LPF   CBC with platelets differential     Status: Abnormal   Result Value Ref Range    WBC 12.4 (H) 4.0 - 11.0 10e9/L    RBC Count 2.74 (L) 4.4 - 5.9 10e12/L    Hemoglobin 9.5 (L) 13.3 - 17.7 g/dL    Hematocrit 28.8 (L) 40.0 - 53.0 %     (H) 78 - 100  fl    MCH 34.7 (H) 26.5 - 33.0 pg    MCHC 33.0 31.5 - 36.5 g/dL    RDW 17.5 (H) 10.0 - 15.0 %    Platelet Count 153 150 - 450 10e9/L    Diff Method Automated Method     % Neutrophils 85.4 %    % Lymphocytes 5.8 %    % Monocytes 7.1 %    % Eosinophils 1.1 %    % Basophils 0.2 %    % Immature Granulocytes 0.4 %    Nucleated RBCs 0 0 /100    Absolute Neutrophil 10.6 (H) 1.6 - 8.3 10e9/L    Absolute Lymphocytes 0.7 (L) 0.8 - 5.3 10e9/L    Absolute Monocytes 0.9 0.0 - 1.3 10e9/L    Absolute Eosinophils 0.1 0.0 - 0.7 10e9/L    Absolute Basophils 0.0 0.0 - 0.2 10e9/L    Abs Immature Granulocytes 0.1 0 - 0.4 10e9/L    Absolute Nucleated RBC 0.0    Comprehensive metabolic panel     Status: Abnormal   Result Value Ref Range    Sodium 137 133 - 144 mmol/L    Potassium 4.3 3.4 - 5.3 mmol/L    Chloride 108 94 - 109 mmol/L    Carbon Dioxide 24 20 - 32 mmol/L    Anion Gap 5 3 - 14 mmol/L    Glucose 112 (H) 70 - 99 mg/dL    Urea Nitrogen 44 (H) 7 - 30 mg/dL    Creatinine 1.71 (H) 0.66 - 1.25 mg/dL    GFR Estimate 39 (L) >60 mL/min/[1.73_m2]    GFR Estimate If Black 45 (L) >60 mL/min/[1.73_m2]    Calcium 8.5 8.5 - 10.1 mg/dL    Bilirubin Total 0.5 0.2 - 1.3 mg/dL    Albumin 2.2 (L) 3.4 - 5.0 g/dL    Protein Total 5.5 (L) 6.8 - 8.8 g/dL    Alkaline Phosphatase 82 40 - 150 U/L    ALT 13 0 - 70 U/L    AST 12 0 - 45 U/L   CBC with platelets differential     Status: Abnormal   Result Value Ref Range    WBC 8.1 4.0 - 11.0 10e9/L    RBC Count 3.25 (L) 4.4 - 5.9 10e12/L    Hemoglobin 11.4 (L) 13.3 - 17.7 g/dL    Hematocrit 33.6 (L) 40.0 - 53.0 %     (H) 78 - 100 fl    MCH 35.1 (H) 26.5 - 33.0 pg    MCHC 33.9 31.5 - 36.5 g/dL    RDW 16.9 (H) 10.0 - 15.0 %    Platelet Count 140 (L) 150 - 450 10e9/L    Diff Method Automated Method     % Neutrophils 84.5 %    % Lymphocytes 4.7 %    % Monocytes 7.4 %    % Eosinophils 2.6 %    % Basophils 0.2 %    % Immature Granulocytes 0.6 %    Nucleated RBCs 0 0 /100    Absolute Neutrophil 6.8 1.6 - 8.3  10e9/L    Absolute Lymphocytes 0.4 (L) 0.8 - 5.3 10e9/L    Absolute Monocytes 0.6 0.0 - 1.3 10e9/L    Absolute Eosinophils 0.2 0.0 - 0.7 10e9/L    Absolute Basophils 0.0 0.0 - 0.2 10e9/L    Abs Immature Granulocytes 0.1 0 - 0.4 10e9/L    Absolute Nucleated RBC 0.0    Basic metabolic panel     Status: Abnormal   Result Value Ref Range    Sodium 136 133 - 144 mmol/L    Potassium 3.9 3.4 - 5.3 mmol/L    Chloride 106 94 - 109 mmol/L    Carbon Dioxide 24 20 - 32 mmol/L    Anion Gap 6 3 - 14 mmol/L    Glucose 100 (H) 70 - 99 mg/dL    Urea Nitrogen 30 7 - 30 mg/dL    Creatinine 1.38 (H) 0.66 - 1.25 mg/dL    GFR Estimate 50 (L) >60 mL/min/[1.73_m2]    GFR Estimate If Black 58 (L) >60 mL/min/[1.73_m2]    Calcium 8.6 8.5 - 10.1 mg/dL   EKG 12-lead, tracing only     Status: None   Result Value Ref Range    Interpretation ECG Click View Image link to view waveform and result    Social Work IP Consult     Status: None ()    Tiana Bo BSW     2/6/2020 11:44 AM  Brief SW note:     SW consulted for discharge planning. Spoke with PA, brittni   consult as medical workup still being completed and needs unknown   at this time. Please re-consult when SW needs are identified.     MURRAY Bullock   Inpatient Care Coordination  [facility]   [phone]     Blood culture     Status: None (Preliminary result)   Result Value Ref Range    Specimen Description Blood Left Arm     Culture Micro No growth after 2 days    Blood culture     Status: None (Preliminary result)   Result Value Ref Range    Specimen Description Blood Left Hand     Culture Micro No growth after 2 days      María Ludwig PA-C

## 2020-02-09 LAB
ANION GAP SERPL CALCULATED.3IONS-SCNC: 7 MMOL/L (ref 3–14)
BUN SERPL-MCNC: 21 MG/DL (ref 7–30)
CALCIUM SERPL-MCNC: 8 MG/DL (ref 8.5–10.1)
CHLORIDE SERPL-SCNC: 106 MMOL/L (ref 94–109)
CO2 SERPL-SCNC: 24 MMOL/L (ref 20–32)
CREAT SERPL-MCNC: 1.39 MG/DL (ref 0.66–1.25)
GFR SERPL CREATININE-BSD FRML MDRD: 50 ML/MIN/{1.73_M2}
GLUCOSE SERPL-MCNC: 98 MG/DL (ref 70–99)
POTASSIUM SERPL-SCNC: 3.6 MMOL/L (ref 3.4–5.3)
SODIUM SERPL-SCNC: 137 MMOL/L (ref 133–144)

## 2020-02-09 PROCEDURE — 25000132 ZZH RX MED GY IP 250 OP 250 PS 637: Performed by: PHYSICIAN ASSISTANT

## 2020-02-09 PROCEDURE — 25000128 H RX IP 250 OP 636: Performed by: PHYSICIAN ASSISTANT

## 2020-02-09 PROCEDURE — 80048 BASIC METABOLIC PNL TOTAL CA: CPT | Performed by: PHYSICIAN ASSISTANT

## 2020-02-09 PROCEDURE — 99232 SBSQ HOSP IP/OBS MODERATE 35: CPT | Performed by: PHYSICIAN ASSISTANT

## 2020-02-09 PROCEDURE — 12000011 ZZH R&B MS OVERFLOW

## 2020-02-09 PROCEDURE — 25000128 H RX IP 250 OP 636: Performed by: HOSPITALIST

## 2020-02-09 PROCEDURE — 25800030 ZZH RX IP 258 OP 636: Performed by: PHYSICIAN ASSISTANT

## 2020-02-09 RX ORDER — METRONIDAZOLE 500 MG/1
500 TABLET ORAL 3 TIMES DAILY
Qty: 21 TABLET | Refills: 0 | Status: SHIPPED | OUTPATIENT
Start: 2020-02-09 | End: 2020-02-09

## 2020-02-09 RX ORDER — SODIUM CHLORIDE 9 MG/ML
INJECTION, SOLUTION INTRAVENOUS CONTINUOUS
Status: DISCONTINUED | OUTPATIENT
Start: 2020-02-09 | End: 2020-02-10

## 2020-02-09 RX ORDER — ATENOLOL 50 MG/1
50 TABLET ORAL DAILY
Status: DISCONTINUED | OUTPATIENT
Start: 2020-02-10 | End: 2020-02-10

## 2020-02-09 RX ORDER — PROCHLORPERAZINE MALEATE 5 MG
5 TABLET ORAL 3 TIMES DAILY
COMMUNITY
Start: 2020-02-09

## 2020-02-09 RX ORDER — CIPROFLOXACIN 500 MG/1
500 TABLET, FILM COATED ORAL 2 TIMES DAILY
Qty: 14 TABLET | Refills: 0 | Status: SHIPPED | OUTPATIENT
Start: 2020-02-09 | End: 2020-02-09

## 2020-02-09 RX ADMIN — CIPROFLOXACIN 400 MG: 2 INJECTION, SOLUTION INTRAVENOUS at 01:09

## 2020-02-09 RX ADMIN — ATENOLOL 100 MG: 50 TABLET ORAL at 08:49

## 2020-02-09 RX ADMIN — HEPARIN, PORCINE (PF) 10 UNIT/ML INTRAVENOUS SYRINGE 5 ML: at 12:04

## 2020-02-09 RX ADMIN — VENLAFAXINE 75 MG: 75 TABLET ORAL at 08:49

## 2020-02-09 RX ADMIN — METRONIDAZOLE 500 MG: 500 INJECTION, SOLUTION INTRAVENOUS at 10:46

## 2020-02-09 RX ADMIN — GABAPENTIN 300 MG: 300 CAPSULE ORAL at 19:58

## 2020-02-09 RX ADMIN — CIPROFLOXACIN 400 MG: 2 INJECTION, SOLUTION INTRAVENOUS at 14:14

## 2020-02-09 RX ADMIN — ASPIRIN 81 MG: 81 TABLET, COATED ORAL at 08:49

## 2020-02-09 RX ADMIN — PROCHLORPERAZINE MALEATE 5 MG: 5 TABLET ORAL at 08:49

## 2020-02-09 RX ADMIN — HEPARIN, PORCINE (PF) 10 UNIT/ML INTRAVENOUS SYRINGE 5 ML: at 05:33

## 2020-02-09 RX ADMIN — OMEPRAZOLE 20 MG: 20 CAPSULE, DELAYED RELEASE ORAL at 19:58

## 2020-02-09 RX ADMIN — DRONABINOL 10 MG: 2.5 CAPSULE ORAL at 16:39

## 2020-02-09 RX ADMIN — PROCHLORPERAZINE MALEATE 5 MG: 5 TABLET ORAL at 14:13

## 2020-02-09 RX ADMIN — ROSUVASTATIN CALCIUM 40 MG: 20 TABLET, FILM COATED ORAL at 19:58

## 2020-02-09 RX ADMIN — GABAPENTIN 100 MG: 100 CAPSULE ORAL at 08:50

## 2020-02-09 RX ADMIN — VENLAFAXINE 75 MG: 75 TABLET ORAL at 19:58

## 2020-02-09 RX ADMIN — ALLOPURINOL 100 MG: 100 TABLET ORAL at 08:50

## 2020-02-09 RX ADMIN — METRONIDAZOLE 500 MG: 500 INJECTION, SOLUTION INTRAVENOUS at 16:32

## 2020-02-09 RX ADMIN — METRONIDAZOLE 500 MG: 500 INJECTION, SOLUTION INTRAVENOUS at 04:24

## 2020-02-09 RX ADMIN — METRONIDAZOLE 500 MG: 500 INJECTION, SOLUTION INTRAVENOUS at 21:44

## 2020-02-09 RX ADMIN — DRONABINOL 10 MG: 2.5 CAPSULE ORAL at 08:49

## 2020-02-09 RX ADMIN — PROCHLORPERAZINE MALEATE 5 MG: 5 TABLET ORAL at 19:58

## 2020-02-09 RX ADMIN — SODIUM CHLORIDE: 9 INJECTION, SOLUTION INTRAVENOUS at 14:09

## 2020-02-09 NOTE — PLAN OF CARE
Patient alert and oriented x4 with some forgetfulness. Vitals are Temp: 96  F (35.6  C) Temp src: Oral BP: (!) 151/82   Heart Rate: 80 Resp: 16 SpO2: 95 % RA. Initially reported 7/10 abdominal pain and nausea- PRN Zofran given. Patient now denies pain. Port heparin locked. Continuing with IV Cipro and Flagyl. Tolerating regular diet, though has a low appetite. Up with standby assistance with walker and gait belt. Tele SR 80s. Plan for symptom management.

## 2020-02-09 NOTE — PROGRESS NOTES
Pt. Up with NST and 2WW in hallways. NST, Gutierrez, reported pt. Not using walker correctly and he reported he was dizzy.  Orthostatic B/P obtained     Laying 111/70 HR 63  Sitting 84/56 HR 75  Pt. Unable to tolerate standing B/P due to dizziness    Reported to PAVEL Daniel.

## 2020-02-09 NOTE — PROGRESS NOTES
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      #Colitis with associated infectious encephalopathy  #Dizziness, confusion, and hypotension: started empirically on Cipro/Flagyl on 2/7 after CT CAP showed moderate diffuse bowel wall thickening involving the cecum and ascending colon. Initially febrile up to 100.7 on 2/6 that has since resolved and currently afebrile with normalization of WBC on 2/8 after leukocytosis of 14.8 on 2/6. Encephalopathy appears to be improving with treatment of infection and he is near baseline without increased confusion after resuming Gabapentin and Marinol on 2/8. Blood cultures with NGTD. Attempted to walk patient today in anticipation he would be able to discharge this afternoon with increased dizziness. Positive orthostatics when checked after walk with patient unable to tolerate checking standing BP. Will restart fluids and reassess if these can be stopped tomorrow and trial PO fluid challenge again.  - Continue Cipro/Flagyl, day#4  - IVF hydration with NS at 100 ml/hour over next day  - Recheck orthostatics in AM  - Monitor for recurrent fevers   - Continue PTA gabapentin, marinol, and tramadol with monitoring for increased confusion  - Continue to hold PTA lorazepam and ritalin   - Appears near baseline for mobility at this time, no need for PT currently       #SAW on CKD: baseline Cr 1.6.  On admission, Cr up to 2.97 with improvement down to 1.39 today with stopping fluids and encouraging PO intake.  Unfortunately orthostatic today, refer to above.   - Follow BMP     #Recently diagnosed NICM, combined CHF  #HTN: Echo 1/14/2020 revealed decreased EF to 45% with G1 LVDD, mild global LV hypokinesis, and small circumferential pericardial effusion.   - BP stable, decrease Atenolol to 50 mg daily (start in AM)  - Continue PTA Atorvastatin  - Hold Lisinopril      #DM: blood glucose in the 100s over the last day, continue to hold PTA metformin.  Monitor.       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  Received neoadjuvant chemoradiation with partial response followed by [place] esophagogastrectomy 11/7/2017.  Surveillance PET/CT 9/4/2018 revealed area in the mediastinum concerning for local recurrence.  EUS 10/22/2018 confirmed metastatic disease.  In Nov 2018, underwent chemoradiation with Xeloda followed by FOLFOX + herceptin.  Then started on maintenance Xeloda + Herceptin.  PET/CT 1/28/2020 showed progression with increased size and FDG avidity of pulmonary, right neck, and scattered soft tissue mets in addition to mediastinal, hilar, and retroperitoneal lymph nodes.  PET also noted bilateral pleural effusions.  Underwent U/S guided thoracentesis 1/28/2020 which revealed a sterile exudative effusion with cytology positive for metastatic disease.  Stopped chemotherapy recently with plans to transition to immunomodulator.   - Case has been discussed with Dr Jean Baptiste this admission.  Patient has been exhibiting some confusion over the last month but his confusion on admission was markedly decompensated from baseline.   - Will defer inpatient Oncology involvement unless patient decompensates and requires goals of care discussion  - Patient and family aware that he will no longer be receiving maintenance chemotherapy but may be placed on immune therapy if his functional status improves.   - Follows with Palliative Care at [facility] (will likely need to rescheduled upcoming appointment on 2/12)    DVT Prophylaxis: Pneumatic Compression Devices  Code Status: Full Code  Disposition: Expected discharge in 1-2 days if orthostatic hypotension improves and patient tolerates adequate PO intake on own      Interval History   Patient appears near his cognitive baseline again today. No increased confusion overnight per nursing with restarting Gabapentin and Marinol. He reports still feeling dizzy when he gets up to the bathroom. He has no nausea today and his abdominal pain has improved expect for some ongoing pain in his right lower quadrant. He has not had a BM today.     Contacted and updated patient's wife Yessy with questions answered.    -Data reviewed today: I reviewed all new labs and imaging reports over the last 24 hours.    Physical Exam   Temp: 96.7  F (35.9  C) Temp src: Oral BP: 112/65   Heart Rate: 66 Resp: 20 SpO2: 96 % O2 Device: None (Room air)    Vitals:    02/05/20 1357 02/05/20 1805 02/06/20 0840   Weight: 70.3 kg (155 lb) 67.8 kg (149 lb 6.4 oz) 69.2 kg (152 lb 8 oz)     Vital Signs with Ranges  Temp:  [95.8  F (35.4  C)-98  F (36.7  C)] 96.7  F (35.9  C)  Heart Rate:  [66-80] 66  Resp:  [16-20] 20  BP: (105-158)/(63-95) 112/65  SpO2:  [93 %-100 %] 96 %  I/O last 3 completed shifts:  In: -   Out: 570 [Urine:570]    GEN:  Alert, oriented x 3 and mildly confused at times, appears comfortable, NAD.  HEENT:  Normocephalic/atraumatic, no scleral icterus, no nasal discharge, mouth moist.  CV:  Regular rate and rhythm, no murmur or JVD.  S1 + S2 noted, no S3 or S4.  LUNGS:  Clear to auscultation bilaterally without rales/rhonchi/wheezing/retractions.  Symmetric chest rise on inhalation noted.  ABD:  Active bowel sounds, soft, milder tenderness to RLQ, non-distended.  No rebound/guarding/rigidity.  EXT: Moving all extremities equally and spontaneously. No edema.  No cyanosis.  No acute joint synovitis noted.  SKIN:  Dry to touch,  no exanthems noted in the visualized areas.  NEURO: mildly disoriented but continues to improve per family    Medications     sodium chloride         allopurinol  100 mg Oral Daily     aspirin  81 mg Oral QAM     [START ON 2/10/2020] atenolol  50 mg Oral Daily     ciprofloxacin  400 mg Intravenous Q12H     dronabinol  10 mg Oral BID AC     gabapentin  100 mg Oral QAM     gabapentin  300 mg Oral At Bedtime     heparin  5 mL Intracatheter Q28 Days     heparin lock flush  5-10 mL Intracatheter Q24H     metroNIDAZOLE  500 mg Intravenous Q6H     omeprazole  20 mg Oral QPM     prochlorperazine  5 mg Oral TID     rosuvastatin  40 mg Oral QPM     sodium chloride (PF)  3 mL Intracatheter Q8H     venlafaxine  75 mg Oral BID     Data   Results for orders placed or performed during the hospital encounter of 02/05/20   XR Chest 2 Views     Status: None    Narrative    CHEST TWO VIEWS 2/5/2020 3:08 PM     HISTORY: Chest pain.    COMPARISON: 1/28/2020    FINDINGS: Right-sided Port-A-Cath with the tip in the SVC. Heart size  is normal. No pneumothorax. There are small bilateral pleural  effusions, decreased in size compared to 1/28/2020. Minor hazy opacity  at the right lung base, likely atelectasis. Heart size normal.      Impression    IMPRESSION: Small bilateral pleural effusions with minor atelectasis  at the right lung base.    SKYE CORONADO MD   CT Chest Abdomen Pelvis w/o Contrast     Status: None    Narrative    CT CHEST, ABDOMEN AND PELVIS WITHOUT CONTRAST   2/6/2020 10:57 AM     HISTORY: Fever. Presyncope. Weakness. Recent chemotherapy. History of  esophageal cancer.    TECHNIQUE: Volumetric helical sections were acquired from the thoracic  inlet to the ischial tuberosities without IV contrast. Coronal images  were also reconstructed. Radiation dose for this scan was reduced  using automated exposure control, adjustment of the mA and/or kV  according to patient size, or iterative reconstruction  technique.    COMPARISON: PET/CT performed 1/28/2020.    FINDINGS:    Chest: Small-to-moderate bilateral pleural effusions are not  significantly changed. Mild patchy associated infiltrate or  atelectasis in both lower lungs posteriorly as well as within the left  upper lobe laterally, also unchanged. Hazy nodular opacities in both  lungs are also unchanged. Postoperative changes of esophagectomy and  gastric pull-through are again noted. Right Port-A-Cath, with tip in  the low SVC. Atherosclerotic calcification of the thoracic aorta and  coronary arteries. Mild mediastinal and bilateral hilar adenopathy is  not significantly changed. For example, an enlarged lymph node in the  left paratracheal region (series 5 image 45) measures 2.4 x 1.5 cm,  and is not significantly changed. No pericardial effusion. No  pneumothorax.     Abdomen and Pelvis: Indeterminate 1.6 cm right adrenal nodule is  unchanged. The liver, gallbladder, spleen, adrenal glands, pancreas,  and kidneys have otherwise unremarkable noncontrast appearances. No  hydronephrosis. Mild atherosclerotic aortoiliac calcification.  Moderate diffuse bowel wall thickening involving the cecum and  ascending colon is new since the previous exam, and likely represents  an infectious or inflammatory colitis. No bowel obstruction. Trace  amount of nonspecific free fluid in the pelvis. There is marked  prostatic enlargement with mild central prostatic calcification. Mild  retroperitoneal adenopathy is not significantly changed. For example,  a borderline-enlarged right para-aortic lymph node (series 5 image  175) measures 1.8 x 1 cm. Degenerative changes are noted throughout  the thoracolumbar spine.      Impression    IMPRESSION:   1. Moderate diffuse bowel wall thickening involving the cecum and  ascending colon likely represents an infectious or inflammatory  colitis. Neutropenic colitis could have this appearance.  2. Small-to-moderate bilateral pleural effusions  and patchy associated  infiltrate and/or atelectasis in both lungs are unchanged.  3. Mild mediastinal, bilateral hilar, and retroperitoneal adenopathy,  as well as scattered hazy nodular opacities in both lungs, are not  significantly changed, and remain suspicious for metastatic  involvement.  4. Indeterminate right adrenal nodule is unchanged, and is also  suspicious for metastatic disease.  5. Marked prostatic enlargement.    WILLIE SUTTON MD   CT Head w/o Contrast     Status: None    Narrative    CT SCAN OF THE HEAD WITHOUT CONTRAST   2/6/2020 10:55 AM     HISTORY: Dizziness. Evaluate for brain metastasis.    TECHNIQUE:  Axial images of the head and coronal reformations without  IV contrast material. Radiation dose for this scan was reduced using  automated exposure control, adjustment of the mA and/or kV according  to patient size, or iterative reconstruction technique.    COMPARISON: None.    FINDINGS: There is no evidence of intracranial hemorrhage, mass,  extended signs of acute infarct or anomaly. No definite vasogenic  edema. No mass effect or shift/herniation. The ventricles are normal  in size, shape and configuration. Mild diffuse parenchymal volume  loss. Mild scattered patchy periventricular white matter hypodensities  which are nonspecific, but likely related to chronic microvascular  ischemic disease. Scattered vascular calcifications.    Bilateral lens replacements. Orbits otherwise normal. The visualized  paranasal sinuses are free of significant disease. Trace layering  fluid in the right mastoid tip. There is also probable trace left  mastoid tip opacification. The left mastoid is somewhat relatively  under-pneumatized as compared to the right. The middle ear cavities  are clear. The bony calvarium and bones of the skull base appear  intact.       Impression    IMPRESSION:     1. No evidence of acute intracranial hemorrhage, mass, or herniation.  2. Diffuse parenchymal volume loss and white  matter changes likely due  to chronic microvascular ischemic disease.   3. Bilateral mastoid tip fluid.    If there is persistent clinical concern for intracranial metastatic  disease, MRI brain without and with contrast would be more sensitive  for the detection of small intracranial metastases.    KEANU SIMEON MD   CBC with platelets differential     Status: Abnormal   Result Value Ref Range    WBC 9.5 4.0 - 11.0 10e9/L    RBC Count 3.32 (L) 4.4 - 5.9 10e12/L    Hemoglobin 11.7 (L) 13.3 - 17.7 g/dL    Hematocrit 35.9 (L) 40.0 - 53.0 %     (H) 78 - 100 fl    MCH 35.2 (H) 26.5 - 33.0 pg    MCHC 32.6 31.5 - 36.5 g/dL    RDW 17.2 (H) 10.0 - 15.0 %    Platelet Count 187 150 - 450 10e9/L    Diff Method Automated Method     % Neutrophils 77.0 %    % Lymphocytes 10.5 %    % Monocytes 9.5 %    % Eosinophils 1.9 %    % Basophils 0.6 %    % Immature Granulocytes 0.5 %    Nucleated RBCs 0 0 /100    Absolute Neutrophil 7.3 1.6 - 8.3 10e9/L    Absolute Lymphocytes 1.0 0.8 - 5.3 10e9/L    Absolute Monocytes 0.9 0.0 - 1.3 10e9/L    Absolute Eosinophils 0.2 0.0 - 0.7 10e9/L    Absolute Basophils 0.1 0.0 - 0.2 10e9/L    Abs Immature Granulocytes 0.1 0 - 0.4 10e9/L    Absolute Nucleated RBC 0.0    Basic metabolic panel     Status: Abnormal   Result Value Ref Range    Sodium 137 133 - 144 mmol/L    Potassium 4.6 3.4 - 5.3 mmol/L    Chloride 108 94 - 109 mmol/L    Carbon Dioxide 19 (L) 20 - 32 mmol/L    Anion Gap 10 3 - 14 mmol/L    Glucose 203 (H) 70 - 99 mg/dL    Urea Nitrogen 54 (H) 7 - 30 mg/dL    Creatinine 2.97 (H) 0.66 - 1.25 mg/dL    GFR Estimate 20 (L) >60 mL/min/[1.73_m2]    GFR Estimate If Black 23 (L) >60 mL/min/[1.73_m2]    Calcium 9.0 8.5 - 10.1 mg/dL   Lactic acid whole blood     Status: Abnormal   Result Value Ref Range    Lactic Acid 3.2 (H) 0.7 - 2.0 mmol/L   Troponin I     Status: None   Result Value Ref Range    Troponin I ES <0.015 0.000 - 0.045 ug/L   Lactic acid whole blood     Status: None   Result  Value Ref Range    Lactic Acid 1.4 0.7 - 2.0 mmol/L   Hemoglobin A1c     Status: Abnormal   Result Value Ref Range    Hemoglobin A1C 6.4 (H) 0 - 5.6 %   Basic metabolic panel     Status: Abnormal   Result Value Ref Range    Sodium 139 133 - 144 mmol/L    Potassium 4.8 3.4 - 5.3 mmol/L    Chloride 112 (H) 94 - 109 mmol/L    Carbon Dioxide 18 (L) 20 - 32 mmol/L    Anion Gap 9 3 - 14 mmol/L    Glucose 160 (H) 70 - 99 mg/dL    Urea Nitrogen 53 (H) 7 - 30 mg/dL    Creatinine 2.60 (H) 0.66 - 1.25 mg/dL    GFR Estimate 23 (L) >60 mL/min/[1.73_m2]    GFR Estimate If Black 27 (L) >60 mL/min/[1.73_m2]    Calcium 8.8 8.5 - 10.1 mg/dL   CBC with platelets     Status: Abnormal   Result Value Ref Range    WBC 14.8 (H) 4.0 - 11.0 10e9/L    RBC Count 3.34 (L) 4.4 - 5.9 10e12/L    Hemoglobin 11.5 (L) 13.3 - 17.7 g/dL    Hematocrit 35.4 (L) 40.0 - 53.0 %     (H) 78 - 100 fl    MCH 34.4 (H) 26.5 - 33.0 pg    MCHC 32.5 31.5 - 36.5 g/dL    RDW 17.4 (H) 10.0 - 15.0 %    Platelet Count 170 150 - 450 10e9/L   Lactic acid whole blood     Status: None   Result Value Ref Range    Lactic Acid 1.1 0.7 - 2.0 mmol/L   WBC Differential     Status: Abnormal   Result Value Ref Range    Diff Method Automated Method     % Neutrophils 89.7 %    % Lymphocytes 4.1 %    % Monocytes 5.5 %    % Eosinophils 0.0 %    % Basophils 0.2 %    % Immature Granulocytes 0.5 %    Nucleated RBCs 0 0 /100    Absolute Neutrophil 13.2 (H) 1.6 - 8.3 10e9/L    Absolute Lymphocytes 0.6 (L) 0.8 - 5.3 10e9/L    Absolute Monocytes 0.8 0.0 - 1.3 10e9/L    Absolute Eosinophils 0.0 0.0 - 0.7 10e9/L    Absolute Basophils 0.0 0.0 - 0.2 10e9/L    Abs Immature Granulocytes 0.1 0 - 0.4 10e9/L    Absolute Nucleated RBC 0.0    UA with Microscopic reflex to Culture     Status: Abnormal   Result Value Ref Range    Color Urine Light Yellow     Appearance Urine Clear     Glucose Urine Negative NEG^Negative mg/dL    Bilirubin Urine Negative NEG^Negative    Ketones Urine Negative  NEG^Negative mg/dL    Specific Gravity Urine 1.020 1.003 - 1.035    Blood Urine Trace (A) NEG^Negative    pH Urine 5.0 5.0 - 7.0 pH    Protein Albumin Urine 30 (A) NEG^Negative mg/dL    Urobilinogen mg/dL Normal 0.0 - 2.0 mg/dL    Nitrite Urine Negative NEG^Negative    Leukocyte Esterase Urine Negative NEG^Negative    Source Midstream Urine     WBC Urine 1 0 - 5 /HPF    RBC Urine 1 0 - 2 /HPF    Mucous Urine Present (A) NEG^Negative /LPF   CBC with platelets differential     Status: Abnormal   Result Value Ref Range    WBC 12.4 (H) 4.0 - 11.0 10e9/L    RBC Count 2.74 (L) 4.4 - 5.9 10e12/L    Hemoglobin 9.5 (L) 13.3 - 17.7 g/dL    Hematocrit 28.8 (L) 40.0 - 53.0 %     (H) 78 - 100 fl    MCH 34.7 (H) 26.5 - 33.0 pg    MCHC 33.0 31.5 - 36.5 g/dL    RDW 17.5 (H) 10.0 - 15.0 %    Platelet Count 153 150 - 450 10e9/L    Diff Method Automated Method     % Neutrophils 85.4 %    % Lymphocytes 5.8 %    % Monocytes 7.1 %    % Eosinophils 1.1 %    % Basophils 0.2 %    % Immature Granulocytes 0.4 %    Nucleated RBCs 0 0 /100    Absolute Neutrophil 10.6 (H) 1.6 - 8.3 10e9/L    Absolute Lymphocytes 0.7 (L) 0.8 - 5.3 10e9/L    Absolute Monocytes 0.9 0.0 - 1.3 10e9/L    Absolute Eosinophils 0.1 0.0 - 0.7 10e9/L    Absolute Basophils 0.0 0.0 - 0.2 10e9/L    Abs Immature Granulocytes 0.1 0 - 0.4 10e9/L    Absolute Nucleated RBC 0.0    Comprehensive metabolic panel     Status: Abnormal   Result Value Ref Range    Sodium 137 133 - 144 mmol/L    Potassium 4.3 3.4 - 5.3 mmol/L    Chloride 108 94 - 109 mmol/L    Carbon Dioxide 24 20 - 32 mmol/L    Anion Gap 5 3 - 14 mmol/L    Glucose 112 (H) 70 - 99 mg/dL    Urea Nitrogen 44 (H) 7 - 30 mg/dL    Creatinine 1.71 (H) 0.66 - 1.25 mg/dL    GFR Estimate 39 (L) >60 mL/min/[1.73_m2]    GFR Estimate If Black 45 (L) >60 mL/min/[1.73_m2]    Calcium 8.5 8.5 - 10.1 mg/dL    Bilirubin Total 0.5 0.2 - 1.3 mg/dL    Albumin 2.2 (L) 3.4 - 5.0 g/dL    Protein Total 5.5 (L) 6.8 - 8.8 g/dL    Alkaline  Phosphatase 82 40 - 150 U/L    ALT 13 0 - 70 U/L    AST 12 0 - 45 U/L   CBC with platelets differential     Status: Abnormal   Result Value Ref Range    WBC 8.1 4.0 - 11.0 10e9/L    RBC Count 3.25 (L) 4.4 - 5.9 10e12/L    Hemoglobin 11.4 (L) 13.3 - 17.7 g/dL    Hematocrit 33.6 (L) 40.0 - 53.0 %     (H) 78 - 100 fl    MCH 35.1 (H) 26.5 - 33.0 pg    MCHC 33.9 31.5 - 36.5 g/dL    RDW 16.9 (H) 10.0 - 15.0 %    Platelet Count 140 (L) 150 - 450 10e9/L    Diff Method Automated Method     % Neutrophils 84.5 %    % Lymphocytes 4.7 %    % Monocytes 7.4 %    % Eosinophils 2.6 %    % Basophils 0.2 %    % Immature Granulocytes 0.6 %    Nucleated RBCs 0 0 /100    Absolute Neutrophil 6.8 1.6 - 8.3 10e9/L    Absolute Lymphocytes 0.4 (L) 0.8 - 5.3 10e9/L    Absolute Monocytes 0.6 0.0 - 1.3 10e9/L    Absolute Eosinophils 0.2 0.0 - 0.7 10e9/L    Absolute Basophils 0.0 0.0 - 0.2 10e9/L    Abs Immature Granulocytes 0.1 0 - 0.4 10e9/L    Absolute Nucleated RBC 0.0    Basic metabolic panel     Status: Abnormal   Result Value Ref Range    Sodium 136 133 - 144 mmol/L    Potassium 3.9 3.4 - 5.3 mmol/L    Chloride 106 94 - 109 mmol/L    Carbon Dioxide 24 20 - 32 mmol/L    Anion Gap 6 3 - 14 mmol/L    Glucose 100 (H) 70 - 99 mg/dL    Urea Nitrogen 30 7 - 30 mg/dL    Creatinine 1.38 (H) 0.66 - 1.25 mg/dL    GFR Estimate 50 (L) >60 mL/min/[1.73_m2]    GFR Estimate If Black 58 (L) >60 mL/min/[1.73_m2]    Calcium 8.6 8.5 - 10.1 mg/dL   Basic metabolic panel     Status: Abnormal   Result Value Ref Range    Sodium 137 133 - 144 mmol/L    Potassium 3.6 3.4 - 5.3 mmol/L    Chloride 106 94 - 109 mmol/L    Carbon Dioxide 24 20 - 32 mmol/L    Anion Gap 7 3 - 14 mmol/L    Glucose 98 70 - 99 mg/dL    Urea Nitrogen 21 7 - 30 mg/dL    Creatinine 1.39 (H) 0.66 - 1.25 mg/dL    GFR Estimate 50 (L) >60 mL/min/[1.73_m2]    GFR Estimate If Black 57 (L) >60 mL/min/[1.73_m2]    Calcium 8.0 (L) 8.5 - 10.1 mg/dL   EKG 12-lead, tracing only     Status: None    Result Value Ref Range    Interpretation ECG Click View Image link to view waveform and result    Social Work IP Consult     Status: None ()    Tiana Bo BSW     2/6/2020 11:44 AM  Brief SW note:     SW consulted for discharge planning. Spoke with PA, brittni   consult as medical workup still being completed and needs unknown   at this time. Please re-consult when SW needs are identified.     MURRAY Bullock   Inpatient Care Coordination  [facility]   [phone]     Blood culture     Status: None (Preliminary result)   Result Value Ref Range    Specimen Description Blood Left Arm     Culture Micro No growth after 3 days    Blood culture     Status: None (Preliminary result)   Result Value Ref Range    Specimen Description Blood Left Hand     Culture Micro No growth after 3 days      María Ludwig PA-C

## 2020-02-09 NOTE — PROGRESS NOTES
Pt walked in the lujan with walker via gait belt with assist A1 200 ft,pt was experiencing some dizziness and some forgetfulness.Pt needs to be redirected and reminded while walking.Pt  Was unsteady.

## 2020-02-10 LAB
ANION GAP SERPL CALCULATED.3IONS-SCNC: 4 MMOL/L (ref 3–14)
BASOPHILS # BLD AUTO: 0.1 10E9/L (ref 0–0.2)
BASOPHILS NFR BLD AUTO: 0.6 %
BUN SERPL-MCNC: 16 MG/DL (ref 7–30)
CALCIUM SERPL-MCNC: 8 MG/DL (ref 8.5–10.1)
CHLORIDE SERPL-SCNC: 108 MMOL/L (ref 94–109)
CO2 SERPL-SCNC: 26 MMOL/L (ref 20–32)
CREAT SERPL-MCNC: 1.37 MG/DL (ref 0.66–1.25)
DIFFERENTIAL METHOD BLD: ABNORMAL
EOSINOPHIL # BLD AUTO: 0.4 10E9/L (ref 0–0.7)
EOSINOPHIL NFR BLD AUTO: 4.9 %
ERYTHROCYTE [DISTWIDTH] IN BLOOD BY AUTOMATED COUNT: 17.1 % (ref 10–15)
GFR SERPL CREATININE-BSD FRML MDRD: 50 ML/MIN/{1.73_M2}
GLUCOSE SERPL-MCNC: 97 MG/DL (ref 70–99)
HCT VFR BLD AUTO: 29.5 % (ref 40–53)
HGB BLD-MCNC: 10 G/DL (ref 13.3–17.7)
IMM GRANULOCYTES # BLD: 0 10E9/L (ref 0–0.4)
IMM GRANULOCYTES NFR BLD: 0.5 %
LYMPHOCYTES # BLD AUTO: 0.7 10E9/L (ref 0.8–5.3)
LYMPHOCYTES NFR BLD AUTO: 8.7 %
MCH RBC QN AUTO: 35.3 PG (ref 26.5–33)
MCHC RBC AUTO-ENTMCNC: 33.9 G/DL (ref 31.5–36.5)
MCV RBC AUTO: 104 FL (ref 78–100)
MONOCYTES # BLD AUTO: 0.9 10E9/L (ref 0–1.3)
MONOCYTES NFR BLD AUTO: 10.2 %
NEUTROPHILS # BLD AUTO: 6.3 10E9/L (ref 1.6–8.3)
NEUTROPHILS NFR BLD AUTO: 75.1 %
NRBC # BLD AUTO: 0 10*3/UL
NRBC BLD AUTO-RTO: 0 /100
PLATELET # BLD AUTO: 197 10E9/L (ref 150–450)
POTASSIUM SERPL-SCNC: 3.6 MMOL/L (ref 3.4–5.3)
RBC # BLD AUTO: 2.83 10E12/L (ref 4.4–5.9)
SODIUM SERPL-SCNC: 138 MMOL/L (ref 133–144)
WBC # BLD AUTO: 8.4 10E9/L (ref 4–11)

## 2020-02-10 PROCEDURE — 25000132 ZZH RX MED GY IP 250 OP 250 PS 637: Performed by: PHYSICIAN ASSISTANT

## 2020-02-10 PROCEDURE — 99232 SBSQ HOSP IP/OBS MODERATE 35: CPT | Performed by: HOSPITALIST

## 2020-02-10 PROCEDURE — 85025 COMPLETE CBC W/AUTO DIFF WBC: CPT | Performed by: PHYSICIAN ASSISTANT

## 2020-02-10 PROCEDURE — 25000132 ZZH RX MED GY IP 250 OP 250 PS 637: Performed by: HOSPITALIST

## 2020-02-10 PROCEDURE — 25800030 ZZH RX IP 258 OP 636: Performed by: HOSPITALIST

## 2020-02-10 PROCEDURE — 25800030 ZZH RX IP 258 OP 636: Performed by: PHYSICIAN ASSISTANT

## 2020-02-10 PROCEDURE — 99207 ZZC CDG-MDM COMPONENT: MEETS LOW - DOWN CODED: CPT | Performed by: HOSPITALIST

## 2020-02-10 PROCEDURE — 80048 BASIC METABOLIC PNL TOTAL CA: CPT | Performed by: PHYSICIAN ASSISTANT

## 2020-02-10 PROCEDURE — 25000128 H RX IP 250 OP 636: Performed by: HOSPITALIST

## 2020-02-10 PROCEDURE — 12000011 ZZH R&B MS OVERFLOW

## 2020-02-10 PROCEDURE — 25000128 H RX IP 250 OP 636: Performed by: PHYSICIAN ASSISTANT

## 2020-02-10 RX ORDER — CIPROFLOXACIN 500 MG/1
500 TABLET, FILM COATED ORAL EVERY 12 HOURS SCHEDULED
Status: DISCONTINUED | OUTPATIENT
Start: 2020-02-10 | End: 2020-02-12 | Stop reason: HOSPADM

## 2020-02-10 RX ORDER — SODIUM CHLORIDE, SODIUM LACTATE, POTASSIUM CHLORIDE, CALCIUM CHLORIDE 600; 310; 30; 20 MG/100ML; MG/100ML; MG/100ML; MG/100ML
INJECTION, SOLUTION INTRAVENOUS CONTINUOUS
Status: DISCONTINUED | OUTPATIENT
Start: 2020-02-10 | End: 2020-02-12 | Stop reason: HOSPADM

## 2020-02-10 RX ORDER — HEPARIN SODIUM 5000 [USP'U]/.5ML
5000 INJECTION, SOLUTION INTRAVENOUS; SUBCUTANEOUS EVERY 8 HOURS
Status: DISCONTINUED | OUTPATIENT
Start: 2020-02-10 | End: 2020-02-12 | Stop reason: HOSPADM

## 2020-02-10 RX ORDER — METRONIDAZOLE 250 MG/1
250 TABLET ORAL 3 TIMES DAILY
Status: DISCONTINUED | OUTPATIENT
Start: 2020-02-10 | End: 2020-02-12 | Stop reason: HOSPADM

## 2020-02-10 RX ADMIN — METRONIDAZOLE 250 MG: 250 TABLET ORAL at 14:25

## 2020-02-10 RX ADMIN — DRONABINOL 10 MG: 2.5 CAPSULE ORAL at 16:47

## 2020-02-10 RX ADMIN — GABAPENTIN 300 MG: 300 CAPSULE ORAL at 21:09

## 2020-02-10 RX ADMIN — SODIUM CHLORIDE 500 ML: 9 INJECTION, SOLUTION INTRAVENOUS at 12:08

## 2020-02-10 RX ADMIN — ALLOPURINOL 100 MG: 100 TABLET ORAL at 08:18

## 2020-02-10 RX ADMIN — GABAPENTIN 100 MG: 100 CAPSULE ORAL at 08:18

## 2020-02-10 RX ADMIN — METRONIDAZOLE 500 MG: 500 INJECTION, SOLUTION INTRAVENOUS at 04:04

## 2020-02-10 RX ADMIN — ROSUVASTATIN CALCIUM 40 MG: 20 TABLET, FILM COATED ORAL at 21:10

## 2020-02-10 RX ADMIN — OMEPRAZOLE 20 MG: 20 CAPSULE, DELAYED RELEASE ORAL at 21:09

## 2020-02-10 RX ADMIN — VENLAFAXINE 75 MG: 75 TABLET ORAL at 08:18

## 2020-02-10 RX ADMIN — HEPARIN SODIUM 5000 UNITS: 5000 INJECTION, SOLUTION INTRAVENOUS; SUBCUTANEOUS at 21:09

## 2020-02-10 RX ADMIN — PROCHLORPERAZINE MALEATE 5 MG: 5 TABLET ORAL at 14:25

## 2020-02-10 RX ADMIN — DRONABINOL 10 MG: 2.5 CAPSULE ORAL at 08:18

## 2020-02-10 RX ADMIN — SODIUM CHLORIDE, POTASSIUM CHLORIDE, SODIUM LACTATE AND CALCIUM CHLORIDE: 600; 310; 30; 20 INJECTION, SOLUTION INTRAVENOUS at 12:38

## 2020-02-10 RX ADMIN — SODIUM CHLORIDE: 9 INJECTION, SOLUTION INTRAVENOUS at 04:04

## 2020-02-10 RX ADMIN — CIPROFLOXACIN HYDROCHLORIDE 500 MG: 500 TABLET, FILM COATED ORAL at 21:10

## 2020-02-10 RX ADMIN — ASPIRIN 81 MG: 81 TABLET, COATED ORAL at 08:18

## 2020-02-10 RX ADMIN — CIPROFLOXACIN 400 MG: 2 INJECTION, SOLUTION INTRAVENOUS at 01:37

## 2020-02-10 RX ADMIN — METRONIDAZOLE 500 MG: 500 INJECTION, SOLUTION INTRAVENOUS at 10:21

## 2020-02-10 RX ADMIN — PROCHLORPERAZINE MALEATE 5 MG: 5 TABLET ORAL at 08:18

## 2020-02-10 RX ADMIN — VENLAFAXINE 75 MG: 75 TABLET ORAL at 21:10

## 2020-02-10 RX ADMIN — METRONIDAZOLE 250 MG: 250 TABLET ORAL at 21:09

## 2020-02-10 RX ADMIN — PROCHLORPERAZINE MALEATE 5 MG: 5 TABLET ORAL at 21:09

## 2020-02-10 RX ADMIN — HEPARIN SODIUM 5000 UNITS: 5000 INJECTION, SOLUTION INTRAVENOUS; SUBCUTANEOUS at 14:25

## 2020-02-10 RX ADMIN — SODIUM CHLORIDE, POTASSIUM CHLORIDE, SODIUM LACTATE AND CALCIUM CHLORIDE: 600; 310; 30; 20 INJECTION, SOLUTION INTRAVENOUS at 22:43

## 2020-02-10 NOTE — PLAN OF CARE
"Vitals:/68 (BP Location: Right arm)   Pulse 78   Temp 97.3  F (36.3  C) (Oral)   Resp 16   Ht 1.753 m (5' 9\")   Wt 69.2 kg (152 lb 8 oz)   SpO2 95%   BMI 22.52 kg/m    Labs:Creat 1.39  Cardiac:Orthostatic changes  Neuro:WDL  Resp:WDL  GI:Right sided pain improved, no diarrhea.  Pt. Denies nausea but has little appetite  :WDL  Skin:WDL  Activity:Up SBA/Assist 1 with walker.  Pt increased to assist of 1 with orthostatic changes  Diet:Regular  Pain:Improved  Plan:Flagyl/Cipro for colitis and infective encephalopathy, NS@ 100     "

## 2020-02-10 NOTE — PLAN OF CARE
Patient alert and oriented x4, some forgetfulness. Vitals are Temp: 96.9  F (36.1  C) Temp src: Oral BP: (!) 144/85   Heart Rate: 69 Resp: 18 SpO2: 95 % RA. Reports tolerable pain in lower abdomen. Denies nausea at this time. Tolerating regular diet but has a low appetite. Patient occasionally reports feeling dizzy with changes in position, especially siting to standing. Continuing to monitor vitals. Patient up with assist of 1 with walker and gait belt. Port infusing NS at 100 ml/hr. Continuing with IV Flagyl and Cipro for colitis. Mepilex applied to sacrum for blanchable redness. Lung sounds clear. Cardiac WNL. Denies urinary symptoms. Plan for continued symptom management and supportive cares.

## 2020-02-10 NOTE — PROGRESS NOTES
[facility]    Medicine Progress Note - Hospitalist Service       Date of Admission:  2/5/2020  Assessment & Plan      Summary of Stay: 73 yo gentleman with metastatic esophageal cancer with lung and hilar mets, cachexia of cancer, DM, HTN, recent dx of non-ischemic cardiomyopathy with EF 60>>45% since October 2019 who presented to [facility] on 2/5/2020 from [facility] with syncope. Found to be orthostatic and also to have a colitis    Syncope due to orthostatic hypotension    - will stop atenolol and lisinopril    - bolus and continue IVF    - if continues to be orthostatic, will try abdominal binder    Colitis with associated infectious encephalopathy    - CT scan done on 2/7 showed moderate diffuse bowel wall thickening involving the cecum and ascending colon. He had fever, elevated WBC and elevated lactate sne was started on antibiotics    - on Cipro/Flagyl Day #4, will change to oral    - resumed gabapentin, marinol, and tramadol    - encephalopathy resolved    - continue to hold PTA lorazepam and ritalin     - appears near baseline for mobility at this time, no need for PT currently      SAW on CKD    - on admission, Cr up to 2.97    - now back at baseline    - continue IVF, as above    - has poor PO intake: encourage oral intake, add boost      Non-ischemic CMP/CHF    - had ECHO 1/14/2020 revealed decreased EF to 45% with G1 LVDD, mild global LV hypokinesis, and small circumferential pericardial effusion    HTN    - has had elevated BPs, but is orthostatic    - stopped atenolol and lisinopril    - would not treat supine BPs, should check BPs sitting/standing      DM    - stopped metformin    - BS stable     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    - diagnosed 7/2017 after developing dysphagia and hiccups    - received neoadjuvant chemoradiation with partial response followed by [place] esophagogastrectomy 11/7/2017    - Recurrence in 11/2018 underwent  chemoradiation with Xeloda followed by FOLFOX + herceptin, then maintenance Xeloda + Herceptin    - PET/CT 1/28/2020 showed progression with increased size and FDG avidity of pulmonary, right neck, and scattered soft tissue mets in addition to mediastinal, hilar, and retroperitoneal lymph nodes, effusion with positive cytology    - stopped chemo with plans to transition to immunomodulator.     - Dr Jean Baptiste aware    -  Follows with Palliative Care at [facility] (will likely need to rescheduled upcoming appointment on 2/12)    Tried calling wife- no answer. Updated wife and daughter in person    DVT Prophylaxis: Pneumatic Compression Devices  Code Status: Full Code  Disposition: Expected discharge likely tomorrow if tolerating adequate PO intake and no recurrent confusion.     Diet: Combination Diet Regular Diet Adult  Diet  Snacks/Supplements Adult: Boost Shake; Between Meals    DVT Prophylaxis: Heparin SQ  Oneal Catheter: not present  Code Status: Full Code      Disposition Plan   Expected discharge: 1-2 days, recommended to prior living arrangement once once not orthostatic.  Entered: Avila Rodrigues MD 02/10/2020, 12:09 PM       The patient's care was discussed with the Bedside Nurse, Patient and Patient's Family.    Avila Rodrigues MD  Hospitalist Service  [facility]    ______________________________________________________________________    Interval History   Patient ambulating to bathroom. Became dizzy (lightheaded) and had to sit on floor. BP while sitting was in the 90's. He denies chest pain, sob, abdo pain, N/V/D    Data reviewed today: I reviewed all medications, new labs and imaging results over the last 24 hours. I personally reviewed no images or EKG's today.    Physical Exam   Vital Signs: Temp: 96.4  F (35.8  C) Temp src: Oral BP: 97/61 Pulse: 73 Heart Rate: 73 Resp: 18 SpO2: 95 % O2 Device: None (Room air)    Weight: 152 lbs 8 oz  Constitutional: awake, alert, cooperative, no apparent  distress, and appears stated age  Eyes: Lids and lashes normal, pupils equal, round and reactive to light, extra ocular muscles intact, sclera clear, conjunctiva normal  ENT: Normocephalic, without obvious abnormality, atraumatic, sinuses nontender on palpation, external ears without lesions, oral pharynx with moist mucous membranes, tonsils without erythema or exudates, gums normal and good dentition.  Respiratory: No increased work of breathing, good air exchange, clear to auscultation bilaterally, no crackles or wheezing  Cardiovascular: Normal apical impulse, regular rate and rhythm, normal S1 and S2, no S3 or S4, and no murmur noted  GI: No scars, normal bowel sounds, soft, non-distended, non-tender, no masses palpated, no hepatosplenomegally  Skin: no bruising or bleeding  Musculoskeletal: no lower extremity pitting edema present    Data   Recent Labs   Lab 02/10/20  0600 02/09/20  0539 02/08/20  0945 02/07/20  0520  02/05/20  1439   WBC 8.4  --  8.1 12.4*   < > 9.5   HGB 10.0*  --  11.4* 9.5*   < > 11.7*   *  --  103* 105*   < > 108*     --  140* 153   < > 187    137 136 137   < > 137   POTASSIUM 3.6 3.6 3.9 4.3   < > 4.6   CHLORIDE 108 106 106 108   < > 108   CO2 26 24 24 24   < > 19*   BUN 16 21 30 44*   < > 54*   CR 1.37* 1.39* 1.38* 1.71*   < > 2.97*   ANIONGAP 4 7 6 5   < > 10   ELIANA 8.0* 8.0* 8.6 8.5   < > 9.0   GLC 97 98 100* 112*   < > 203*   ALBUMIN  --   --   --  2.2*  --   --    PROTTOTAL  --   --   --  5.5*  --   --    BILITOTAL  --   --   --  0.5  --   --    ALKPHOS  --   --   --  82  --   --    ALT  --   --   --  13  --   --    AST  --   --   --  12  --   --    TROPI  --   --   --   --   --  <0.015    < > = values in this interval not displayed.     No results found for this or any previous visit (from the past 24 hour(s)).

## 2020-02-10 NOTE — PLAN OF CARE
"Orthostatic performed: Yes:          Lying Orthostatic BP: 148/91         Sitting Orthostatic BP: 138/81         Standing Orthostatic BP: 81/64     BP (!) 87/57   Pulse 71   Temp 96.5  F (35.8  C) (Oral)   Resp 18   Ht 1.753 m (5' 9\")   Wt 69.2 kg (152 lb 8 oz)   SpO2 97%   BMI 22.52 kg/m      *Please take BPs sitting on edge of bed (not supine).      A&Ox4. Forgetful at times. Up w/ Ax2, gait belt and walker due to dizziness and having to be lowered to the ground with staff at ~12:00 PM today while ambulating from bed to bathroom. Received 500mL bolus afterward. Patient is hypotensive when sitting and standing, often c/o dizziness with position changes and while ambulating. C/o of lower abdominal pain, rates 5/10. Declined Tylenol and Ultram (prn) when offered. Patient was dry-heaving this AM, continues scheduled po Compazine and Marinol. Zofran also available prn. BS active x4, tolerating regular diet, poor appetite, passing flatus. Boost supplement between meals. Patient needs encouragement with po intake. IVF infusing through port (LR at 100mL/hr). Mepilex CDI to sacrum (blanchable redness - skin intact). Transitioned from IV abx to po abx (Cipro and Flagyl) today - tolerating well. Will continue to monitor.   "

## 2020-02-10 NOTE — PLAN OF CARE
Patient was ambulating with staff to bathroom and became dizzy. Lowered to floor and assisted back to bed.

## 2020-02-11 LAB
ANION GAP SERPL CALCULATED.3IONS-SCNC: 4 MMOL/L (ref 3–14)
BASOPHILS # BLD AUTO: 0.1 10E9/L (ref 0–0.2)
BASOPHILS NFR BLD AUTO: 0.6 %
BUN SERPL-MCNC: 14 MG/DL (ref 7–30)
CALCIUM SERPL-MCNC: 8.2 MG/DL (ref 8.5–10.1)
CHLORIDE SERPL-SCNC: 109 MMOL/L (ref 94–109)
CO2 SERPL-SCNC: 26 MMOL/L (ref 20–32)
CREAT SERPL-MCNC: 1.26 MG/DL (ref 0.66–1.25)
DIFFERENTIAL METHOD BLD: ABNORMAL
EOSINOPHIL # BLD AUTO: 0.4 10E9/L (ref 0–0.7)
EOSINOPHIL NFR BLD AUTO: 5.4 %
ERYTHROCYTE [DISTWIDTH] IN BLOOD BY AUTOMATED COUNT: 17.2 % (ref 10–15)
GFR SERPL CREATININE-BSD FRML MDRD: 56 ML/MIN/{1.73_M2}
GLUCOSE SERPL-MCNC: 92 MG/DL (ref 70–99)
HCT VFR BLD AUTO: 30.5 % (ref 40–53)
HGB BLD-MCNC: 10.1 G/DL (ref 13.3–17.7)
IMM GRANULOCYTES # BLD: 0.1 10E9/L (ref 0–0.4)
IMM GRANULOCYTES NFR BLD: 0.9 %
LYMPHOCYTES # BLD AUTO: 0.8 10E9/L (ref 0.8–5.3)
LYMPHOCYTES NFR BLD AUTO: 10.6 %
MCH RBC QN AUTO: 34.4 PG (ref 26.5–33)
MCHC RBC AUTO-ENTMCNC: 33.1 G/DL (ref 31.5–36.5)
MCV RBC AUTO: 104 FL (ref 78–100)
MONOCYTES # BLD AUTO: 0.6 10E9/L (ref 0–1.3)
MONOCYTES NFR BLD AUTO: 7.8 %
NEUTROPHILS # BLD AUTO: 5.9 10E9/L (ref 1.6–8.3)
NEUTROPHILS NFR BLD AUTO: 74.7 %
NRBC # BLD AUTO: 0 10*3/UL
NRBC BLD AUTO-RTO: 0 /100
PLATELET # BLD AUTO: 205 10E9/L (ref 150–450)
POTASSIUM SERPL-SCNC: 3.4 MMOL/L (ref 3.4–5.3)
RBC # BLD AUTO: 2.94 10E12/L (ref 4.4–5.9)
SODIUM SERPL-SCNC: 139 MMOL/L (ref 133–144)
WBC # BLD AUTO: 7.9 10E9/L (ref 4–11)

## 2020-02-11 PROCEDURE — 25000132 ZZH RX MED GY IP 250 OP 250 PS 637: Performed by: PHYSICIAN ASSISTANT

## 2020-02-11 PROCEDURE — 25000128 H RX IP 250 OP 636: Performed by: HOSPITALIST

## 2020-02-11 PROCEDURE — 25000132 ZZH RX MED GY IP 250 OP 250 PS 637: Performed by: HOSPITALIST

## 2020-02-11 PROCEDURE — 25800030 ZZH RX IP 258 OP 636: Performed by: HOSPITALIST

## 2020-02-11 PROCEDURE — 12000011 ZZH R&B MS OVERFLOW

## 2020-02-11 PROCEDURE — 85025 COMPLETE CBC W/AUTO DIFF WBC: CPT | Performed by: HOSPITALIST

## 2020-02-11 PROCEDURE — 80048 BASIC METABOLIC PNL TOTAL CA: CPT | Performed by: HOSPITALIST

## 2020-02-11 PROCEDURE — 99232 SBSQ HOSP IP/OBS MODERATE 35: CPT | Performed by: HOSPITALIST

## 2020-02-11 RX ORDER — GABAPENTIN 100 MG/1
100 CAPSULE ORAL AT BEDTIME
Status: DISCONTINUED | OUTPATIENT
Start: 2020-02-11 | End: 2020-02-12 | Stop reason: HOSPADM

## 2020-02-11 RX ORDER — VENLAFAXINE 37.5 MG/1
37.5 TABLET ORAL 2 TIMES DAILY
Status: DISCONTINUED | OUTPATIENT
Start: 2020-02-11 | End: 2020-02-12 | Stop reason: HOSPADM

## 2020-02-11 RX ORDER — VENLAFAXINE 50 MG/1
50 TABLET ORAL 2 TIMES DAILY
Status: DISCONTINUED | OUTPATIENT
Start: 2020-02-11 | End: 2020-02-11

## 2020-02-11 RX ORDER — PROCHLORPERAZINE MALEATE 5 MG
5 TABLET ORAL 3 TIMES DAILY PRN
Status: DISCONTINUED | OUTPATIENT
Start: 2020-02-11 | End: 2020-02-12 | Stop reason: HOSPADM

## 2020-02-11 RX ADMIN — GABAPENTIN 100 MG: 100 CAPSULE ORAL at 08:16

## 2020-02-11 RX ADMIN — HEPARIN SODIUM 5000 UNITS: 5000 INJECTION, SOLUTION INTRAVENOUS; SUBCUTANEOUS at 05:32

## 2020-02-11 RX ADMIN — METRONIDAZOLE 250 MG: 250 TABLET ORAL at 21:01

## 2020-02-11 RX ADMIN — HEPARIN SODIUM 5000 UNITS: 5000 INJECTION, SOLUTION INTRAVENOUS; SUBCUTANEOUS at 13:51

## 2020-02-11 RX ADMIN — HEPARIN SODIUM 5000 UNITS: 5000 INJECTION, SOLUTION INTRAVENOUS; SUBCUTANEOUS at 21:01

## 2020-02-11 RX ADMIN — GABAPENTIN 100 MG: 100 CAPSULE ORAL at 21:01

## 2020-02-11 RX ADMIN — ALLOPURINOL 100 MG: 100 TABLET ORAL at 08:16

## 2020-02-11 RX ADMIN — CIPROFLOXACIN HYDROCHLORIDE 500 MG: 500 TABLET, FILM COATED ORAL at 21:01

## 2020-02-11 RX ADMIN — CIPROFLOXACIN HYDROCHLORIDE 500 MG: 500 TABLET, FILM COATED ORAL at 08:16

## 2020-02-11 RX ADMIN — OMEPRAZOLE 20 MG: 20 CAPSULE, DELAYED RELEASE ORAL at 21:01

## 2020-02-11 RX ADMIN — PROCHLORPERAZINE MALEATE 5 MG: 5 TABLET ORAL at 08:16

## 2020-02-11 RX ADMIN — DRONABINOL 10 MG: 2.5 CAPSULE ORAL at 06:33

## 2020-02-11 RX ADMIN — ASPIRIN 81 MG: 81 TABLET, COATED ORAL at 08:16

## 2020-02-11 RX ADMIN — VENLAFAXINE 75 MG: 75 TABLET ORAL at 08:15

## 2020-02-11 RX ADMIN — VENLAFAXINE 37.5 MG: 37.5 TABLET ORAL at 21:01

## 2020-02-11 RX ADMIN — DRONABINOL 10 MG: 2.5 CAPSULE ORAL at 16:35

## 2020-02-11 RX ADMIN — METRONIDAZOLE 250 MG: 250 TABLET ORAL at 13:51

## 2020-02-11 RX ADMIN — SODIUM CHLORIDE, POTASSIUM CHLORIDE, SODIUM LACTATE AND CALCIUM CHLORIDE: 600; 310; 30; 20 INJECTION, SOLUTION INTRAVENOUS at 18:39

## 2020-02-11 RX ADMIN — METRONIDAZOLE 250 MG: 250 TABLET ORAL at 08:16

## 2020-02-11 NOTE — PLAN OF CARE
Temp: 96.7  F (35.9  C) Temp src: Oral BP: 108/71   Heart Rate: 83 Resp: 20 SpO2: 94 %    Orientation: Alert and Oriented x4  Neuro: Intact   Pain status: Denies   Activity:SBA, Gait Belt and Walker  Alarms: Bed Alarm  Resp: WDL  Lungs: Clear  Cardiac: WDL  GI: WDL  : WDL  Skin: Bruised  LDA: Port  Infusions: LR running at 100 mL/hr.   Diet: Regular   Consults: None   Plan of care in Hospital: Continues to be orthostatic. Abdominal binder added. ALISSA stockings added. Continues to be orthostatic. Discontinuing medications to help with orthostatic BP. Very dizzy when out of bed. Will continue to monitor.   Discharge: Home with wife once safe.

## 2020-02-11 NOTE — PLAN OF CARE
"VS BP (!) 162/90 (BP Location: Right arm)   Pulse 73   Temp 96.7  F (35.9  C) (Oral)   Resp 18   Ht 1.753 m (5' 9\")   Wt 69.2 kg (152 lb 8 oz)   SpO2 95%   BMI 22.52 kg/m    Lung sounds Clear bilaterally  O2 Room Air  Tele None  Bowel sounds Active and audible in all quadrants  Tolerating Regular diet w/ Supplements  IVF LR @ 100 mL/hr Maintenance fluids infusing in right chest port  Dressings Mepilex on bottom   Tests None  CMS Numbness and tingling bilaterally in both lower extremities  Drains None  Activity up with Ax2 GB Walker d/t syncopal episode earlier in the day  Pain Chronic pain back and abdomen  Patient/family centered care Support given and questions answered  Discharge plan   Home with wife if Labs are WNL and Blood Pressures are not hypotensive. Denies dizziness or shortness of breath when ambulating. Blood pressures are to be performed when patient is sitting upright on side of bed or standing. Continue POC.  "

## 2020-02-11 NOTE — PROGRESS NOTES
[facility]    Medicine Progress Note - Hospitalist Service       Date of Admission:  2/5/2020  Assessment & Plan      Summary of Stay: 73 yo gentleman with metastatic esophageal cancer with lung and hilar mets, cachexia of cancer, DM, HTN, recent dx of non-ischemic cardiomyopathy with EF 60>>45% since October 2019 who presented to [facility] on 2/5/2020 from [facility] with syncope. Found to be orthostatic and also to have a colitis    Syncope due to orthostatic hypotension    - stopped atenolol and lisinopril    - bolus and continue IVF    - now has abdominal binder and stockings    - still orthostatic: he has been on Effexor for decades, which can also cause orthostasis. Will decrease form 75mg BID to 37.5mg BID. Will need to taper down over the next 2 weeks      Colitis with associated infectious encephalopathy    - CT scan done on 2/7 showed moderate diffuse bowel wall thickening involving the cecum and ascending colon. He had fever, elevated WBC and elevated lactate sne was started on antibiotics    - on Cipro/Flagyl Day #5/7, now on oral    - resumed gabapentin (lower dose), marinol, and tramadol    - encephalopathy resolved    - continue to hold PTA lorazepam and ritalin     - appears near baseline for mobility at this time, no need for PT currently      SAW on CKD    - on admission, Cr up to 2.97    - now back at baseline    - continue IVF, as above    - has poor PO intake: encourage oral intake, added boost      Non-ischemic CMP/CHF    - had ECHO 1/14/2020 revealed decreased EF to 45% with G1 LVDD, mild global LV hypokinesis, and small circumferential pericardial effusion    HTN    - has had elevated BPs, but is orthostatic    - stopped atenolol and lisinopril    - would not treat supine BPs, should check BPs sitting/standing      DM    - stopped metformin    - BS stable     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    - diagnosed 7/2017 after developing  dysphagia and hiccups    - received neoadjuvant chemoradiation with partial response followed by [place] esophagogastrectomy 11/7/2017    - Recurrence in 11/2018 underwent chemoradiation with Xeloda followed by FOLFOX + herceptin, then maintenance Xeloda + Herceptin    - PET/CT 1/28/2020 showed progression with increased size and FDG avidity of pulmonary, right neck, and scattered soft tissue mets in addition to mediastinal, hilar, and retroperitoneal lymph nodes, effusion with positive cytology    - stopped chemo with plans to transition to immunomodulator.     - Dr Jean Baptiste aware    -  Follows with Palliative Care at [facility] (will likely need to rescheduled upcoming appointment on 2/12)    - I have offered to have [facility] see them here (offered today and yesterday). They indicate they do not want them consulted and will follow-up as an outpatient    Spoke with patient and wife. They would like to stop unnecessary meds. Stopped statin and decreased gabapentin        Tried calling wife- no answer. Updated wife and daughter in person    DVT Prophylaxis: Pneumatic Compression Devices, heparin  Code Status: Full Code  Disposition: Expected discharge likely tomorrow if tolerating adequate PO intake and no recurrent confusion.     Diet: Combination Diet Regular Diet Adult  Diet  Snacks/Supplements Adult: Boost Shake; Between Meals    DVT Prophylaxis: Heparin SQ  Oneal Catheter: not present  Code Status: Full Code      Disposition Plan   Expected discharge: 1-2 days, recommended to prior living arrangement once once not orthostatic.  Entered: Avila Rodrigues MD 02/11/2020, 1:24 PM       The patient's care was discussed with the Bedside Nurse, Patient and Patient's Family.    Avila Rodrigues MD  Hospitalist Service  [facility]    ______________________________________________________________________    Interval History   Patient in bed. Wife in room. He denies any symptoms. States he did feel dizzy  when he was up and his BP was 57/41.  He denies chest pain, sob, abdo pain, N/V/D.    Data reviewed today: I reviewed all medications, new labs and imaging results over the last 24 hours. I personally reviewed no images or EKG's today.    Physical Exam   Vital Signs: Temp: 96.7  F (35.9  C) Temp src: Oral BP: 108/71 Pulse: 73 Heart Rate: 83 Resp: 20 SpO2: 94 % O2 Device: None (Room air)    Weight: 152 lbs 8 oz  Constitutional: awake, alert, cooperative, no apparent distress, and appears stated age  Eyes: Lids and lashes normal, pupils equal, round and reactive to light, extra ocular muscles intact, sclera clear, conjunctiva normal  ENT: Normocephalic, without obvious abnormality, atraumatic, sinuses nontender on palpation, external ears without lesions, oral pharynx with moist mucous membranes, tonsils without erythema or exudates, gums normal and good dentition.  Respiratory: No increased work of breathing, good air exchange, clear to auscultation bilaterally, no crackles or wheezing  Cardiovascular: Normal apical impulse, regular rate and rhythm, normal S1 and S2, no S3 or S4, and no murmur noted  GI: No scars, normal bowel sounds, soft, non-distended, non-tender, no masses palpated, no hepatosplenomegally  Skin: no bruising or bleeding  Musculoskeletal: no lower extremity pitting edema present    Data   Recent Labs   Lab 02/11/20  0535 02/10/20  0600 02/09/20  0539 02/08/20  0945 02/07/20  0520  02/05/20  1439   WBC 7.9 8.4  --  8.1 12.4*   < > 9.5   HGB 10.1* 10.0*  --  11.4* 9.5*   < > 11.7*   * 104*  --  103* 105*   < > 108*    197  --  140* 153   < > 187    138 137 136 137   < > 137   POTASSIUM 3.4 3.6 3.6 3.9 4.3   < > 4.6   CHLORIDE 109 108 106 106 108   < > 108   CO2 26 26 24 24 24   < > 19*   BUN 14 16 21 30 44*   < > 54*   CR 1.26* 1.37* 1.39* 1.38* 1.71*   < > 2.97*   ANIONGAP 4 4 7 6 5   < > 10   ELIANA 8.2* 8.0* 8.0* 8.6 8.5   < > 9.0   GLC 92 97 98 100* 112*   < > 203*   ALBUMIN  --    --   --   --  2.2*  --   --    PROTTOTAL  --   --   --   --  5.5*  --   --    BILITOTAL  --   --   --   --  0.5  --   --    ALKPHOS  --   --   --   --  82  --   --    ALT  --   --   --   --  13  --   --    AST  --   --   --   --  12  --   --    TROPI  --   --   --   --   --   --  <0.015    < > = values in this interval not displayed.     No results found for this or any previous visit (from the past 24 hour(s)).

## 2020-02-11 NOTE — PROGRESS NOTES
Abdominal binder in place to hopefully help with orthostatics. Patient up to bathroom with 2 assist for safety. No lightheadedness, or dizziness reported.

## 2020-02-12 VITALS
RESPIRATION RATE: 16 BRPM | HEIGHT: 69 IN | SYSTOLIC BLOOD PRESSURE: 135 MMHG | HEART RATE: 73 BPM | OXYGEN SATURATION: 97 % | BODY MASS INDEX: 22.59 KG/M2 | TEMPERATURE: 97.6 F | DIASTOLIC BLOOD PRESSURE: 82 MMHG | WEIGHT: 152.5 LBS

## 2020-02-12 LAB
ANION GAP SERPL CALCULATED.3IONS-SCNC: 6 MMOL/L (ref 3–14)
BACTERIA SPEC CULT: NO GROWTH
BACTERIA SPEC CULT: NO GROWTH
BASOPHILS # BLD AUTO: 0 10E9/L (ref 0–0.2)
BASOPHILS NFR BLD AUTO: 0.4 %
BUN SERPL-MCNC: 11 MG/DL (ref 7–30)
CALCIUM SERPL-MCNC: 8.1 MG/DL (ref 8.5–10.1)
CHLORIDE SERPL-SCNC: 110 MMOL/L (ref 94–109)
CO2 SERPL-SCNC: 25 MMOL/L (ref 20–32)
CREAT SERPL-MCNC: 1.26 MG/DL (ref 0.66–1.25)
DIFFERENTIAL METHOD BLD: ABNORMAL
EOSINOPHIL # BLD AUTO: 0.4 10E9/L (ref 0–0.7)
EOSINOPHIL NFR BLD AUTO: 5.8 %
ERYTHROCYTE [DISTWIDTH] IN BLOOD BY AUTOMATED COUNT: 17.2 % (ref 10–15)
GFR SERPL CREATININE-BSD FRML MDRD: 56 ML/MIN/{1.73_M2}
GLUCOSE SERPL-MCNC: 90 MG/DL (ref 70–99)
HCT VFR BLD AUTO: 28.2 % (ref 40–53)
HGB BLD-MCNC: 9.5 G/DL (ref 13.3–17.7)
IMM GRANULOCYTES # BLD: 0.1 10E9/L (ref 0–0.4)
IMM GRANULOCYTES NFR BLD: 0.8 %
LYMPHOCYTES # BLD AUTO: 0.7 10E9/L (ref 0.8–5.3)
LYMPHOCYTES NFR BLD AUTO: 9.8 %
MCH RBC QN AUTO: 34.4 PG (ref 26.5–33)
MCHC RBC AUTO-ENTMCNC: 33.7 G/DL (ref 31.5–36.5)
MCV RBC AUTO: 102 FL (ref 78–100)
MONOCYTES # BLD AUTO: 0.8 10E9/L (ref 0–1.3)
MONOCYTES NFR BLD AUTO: 10.3 %
NEUTROPHILS # BLD AUTO: 5.4 10E9/L (ref 1.6–8.3)
NEUTROPHILS NFR BLD AUTO: 72.9 %
NRBC # BLD AUTO: 0 10*3/UL
NRBC BLD AUTO-RTO: 0 /100
PLATELET # BLD AUTO: 209 10E9/L (ref 150–450)
POTASSIUM SERPL-SCNC: 3.4 MMOL/L (ref 3.4–5.3)
RBC # BLD AUTO: 2.76 10E12/L (ref 4.4–5.9)
SODIUM SERPL-SCNC: 141 MMOL/L (ref 133–144)
SPECIMEN SOURCE: NORMAL
SPECIMEN SOURCE: NORMAL
WBC # BLD AUTO: 7.4 10E9/L (ref 4–11)

## 2020-02-12 PROCEDURE — 25000132 ZZH RX MED GY IP 250 OP 250 PS 637: Performed by: PHYSICIAN ASSISTANT

## 2020-02-12 PROCEDURE — 80048 BASIC METABOLIC PNL TOTAL CA: CPT | Performed by: HOSPITALIST

## 2020-02-12 PROCEDURE — 25000132 ZZH RX MED GY IP 250 OP 250 PS 637: Performed by: HOSPITALIST

## 2020-02-12 PROCEDURE — 25000128 H RX IP 250 OP 636: Performed by: HOSPITALIST

## 2020-02-12 PROCEDURE — 25000128 H RX IP 250 OP 636: Performed by: PHYSICIAN ASSISTANT

## 2020-02-12 PROCEDURE — 85025 COMPLETE CBC W/AUTO DIFF WBC: CPT | Performed by: HOSPITALIST

## 2020-02-12 PROCEDURE — 99239 HOSP IP/OBS DSCHRG MGMT >30: CPT | Performed by: HOSPITALIST

## 2020-02-12 PROCEDURE — 25800030 ZZH RX IP 258 OP 636: Performed by: HOSPITALIST

## 2020-02-12 RX ORDER — PROCHLORPERAZINE MALEATE 5 MG
5 TABLET ORAL 3 TIMES DAILY PRN
Qty: 60 TABLET | Refills: 0 | Status: SHIPPED | OUTPATIENT
Start: 2020-02-12

## 2020-02-12 RX ORDER — VENLAFAXINE 25 MG/1
TABLET ORAL
Qty: 9 TABLET | Refills: 0 | Status: SHIPPED | OUTPATIENT
Start: 2020-02-15 | End: 2020-02-21

## 2020-02-12 RX ORDER — VENLAFAXINE 37.5 MG/1
37.5 TABLET ORAL 2 TIMES DAILY
Qty: 6 TABLET | Refills: 0 | Status: SHIPPED | OUTPATIENT
Start: 2020-02-12

## 2020-02-12 RX ORDER — METRONIDAZOLE 250 MG/1
250 TABLET ORAL 3 TIMES DAILY
Qty: 15 TABLET | Refills: 0 | Status: SHIPPED | OUTPATIENT
Start: 2020-02-12 | End: 2020-02-17

## 2020-02-12 RX ORDER — VENLAFAXINE 25 MG/1
TABLET ORAL
Qty: 21 TABLET | Refills: 0 | Status: SHIPPED | OUTPATIENT
Start: 2020-02-15 | End: 2020-02-12

## 2020-02-12 RX ORDER — VENLAFAXINE 37.5 MG/1
37.5 TABLET ORAL 2 TIMES DAILY
Qty: 6 TABLET | Refills: 0 | Status: SHIPPED | OUTPATIENT
Start: 2020-02-12 | End: 2020-02-12

## 2020-02-12 RX ORDER — CIPROFLOXACIN 500 MG/1
500 TABLET, FILM COATED ORAL EVERY 12 HOURS
Qty: 3 TABLET | Refills: 0 | Status: SHIPPED | OUTPATIENT
Start: 2020-02-12 | End: 2020-02-14

## 2020-02-12 RX ADMIN — ALLOPURINOL 100 MG: 100 TABLET ORAL at 08:04

## 2020-02-12 RX ADMIN — CIPROFLOXACIN HYDROCHLORIDE 500 MG: 500 TABLET, FILM COATED ORAL at 08:04

## 2020-02-12 RX ADMIN — VENLAFAXINE 37.5 MG: 37.5 TABLET ORAL at 08:04

## 2020-02-12 RX ADMIN — SODIUM CHLORIDE, POTASSIUM CHLORIDE, SODIUM LACTATE AND CALCIUM CHLORIDE: 600; 310; 30; 20 INJECTION, SOLUTION INTRAVENOUS at 04:15

## 2020-02-12 RX ADMIN — DRONABINOL 10 MG: 2.5 CAPSULE ORAL at 07:00

## 2020-02-12 RX ADMIN — HEPARIN 5 ML: 100 SYRINGE at 11:59

## 2020-02-12 RX ADMIN — HEPARIN SODIUM 5000 UNITS: 5000 INJECTION, SOLUTION INTRAVENOUS; SUBCUTANEOUS at 04:30

## 2020-02-12 RX ADMIN — ACETAMINOPHEN 650 MG: 325 TABLET, FILM COATED ORAL at 04:29

## 2020-02-12 RX ADMIN — GABAPENTIN 100 MG: 100 CAPSULE ORAL at 08:04

## 2020-02-12 RX ADMIN — HEPARIN, PORCINE (PF) 10 UNIT/ML INTRAVENOUS SYRINGE 5 ML: at 06:00

## 2020-02-12 RX ADMIN — ASPIRIN 81 MG: 81 TABLET, COATED ORAL at 08:04

## 2020-02-12 RX ADMIN — PROCHLORPERAZINE MALEATE 5 MG: 5 TABLET ORAL at 08:47

## 2020-02-12 RX ADMIN — METRONIDAZOLE 250 MG: 250 TABLET ORAL at 08:04

## 2020-02-12 NOTE — PROGRESS NOTES
[facility]  Met with pt to discuss plans for HC.  Pt to be discharged home today.  Met with patient to discuss benefits and recommendations of home care.  At this time, patient is refusing home care services.  Care team notified of patient refusal.  Pt has 24 hour phone number for [facility] for any questions or concerns.    Pt now agreeing to home care, met with pt and family to discuss plans for HC.  Pt to be discharged home today and has agreed to have [facility] follow with services of SN and PT.  Central Intake processing referral.  Pt and family verbalized understanding that initial visit is scheduled for 2/15/2020, per their request.  Pt has 24 hour phone number for [facility] for any questions or concerns.

## 2020-02-12 NOTE — PLAN OF CARE
Pt A&Ox4, forgetful at times. Soft bp's otherwise VSS on RA. Orthostatic BP's, Abdominal binder and allie stockings on. Up with assist x1 with walker, continues to have dizziness/weakness. IVF infusing to port. Recheck labs in AM. Will continue to monitor.     Temp: 96.4  F (35.8  C) Temp src: Oral BP: 108/71   Heart Rate: 78 Resp: 20 SpO2: 93 % O2 Device: None (Room air)

## 2020-02-12 NOTE — DISCHARGE SUMMARY
[facility]  Hospitalist Discharge Summary       Date of Admission:  2/5/2020  Date of Discharge:  2/12/2020  Discharging Provider: Avila Rodrigues MD      Discharge Diagnoses   Syncope due to orthostatic hypotension. Colitis. Metabolic encephalopathy    Follow-ups Needed After Discharge   Follow-up Appointments     Follow-up and recommended labs and tests       Follow up with primary care provider, [facility], within   7 days for hospital follow- up.  The following labs/tests are recommended:   follow-up on orthostatic hypotension.             Unresulted Labs Ordered in the Past 30 Days of this Admission     No orders found from 1/6/2020 to 2/6/2020.      These results will be followed up by NA    Discharge Disposition   Discharged to home  Condition at discharge: Stable    Hospital Course   Summary of Stay: 75 yo gentleman with metastatic esophageal cancer with lung and hilar mets, cachexia of cancer, DM, HTN, recent dx of non-ischemic cardiomyopathy with EF 60>>45% since October 2019 who presented to [facility] on 2/5/2020 from [facility] with syncope. Found to be orthostatic and also to have a colitis     Syncope due to orthostatic hypotension    - stopped atenolol and lisinopril    - received IVF    - now has abdominal binder and stockings    - he has been on Effexor for decades, which can also cause orthostasis. Will decrease form 75mg BID to 37.5mg BID. Will need to taper down over the next 2 weeks. Patient will be discharged with tapering doses of Effexor.    - today his orthostasis has improved and he is not symptomatic with ambulation in the [place]      Colitis with associated infectious encephalopathy    - CT scan done on 2/7 showed moderate diffuse bowel wall thickening involving the cecum and ascending colon. He had fever, elevated WBC and elevated lactate sne was started on antibiotics    - on Cipro/Flagyl Day #6/7, now on oral: will complete as outpt    - resumed gabapentin  (lower dose), marinol, and tramadol    - encephalopathy resolved    - continue to hold PTA lorazepam and ritalin     - appears near baseline for mobility at this time, no need for PT currently       SAW on CKD    - on admission, Cr up to 2.97    - now back at baseline    - continue IVF, as above    - has poor PO intake: encourage oral intake, added boost      Non-ischemic CMP/CHF    - had ECHO 1/14/2020 revealed decreased EF to 45% with G1 LVDD, mild global LV hypokinesis, and small circumferential pericardial effusion     HTN    - has had elevated BPs, but is orthostatic    - stopped atenolol and lisinopril    - would not treat supine BPs, should check BPs sitting/standing      DM    - stopped metformin    - BS stable      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    - diagnosed 7/2017 after developing dysphagia and hiccups    - received neoadjuvant chemoradiation with partial response followed by [facility] esophagogastrectomy 11/7/2017    - Recurrence in 11/2018 underwent chemoradiation with Xeloda followed by FOLFOX + herceptin, then maintenance Xeloda + Herceptin    - PET/CT 1/28/2020 showed progression with increased size and FDG avidity of pulmonary, right neck, and scattered soft tissue mets in addition to mediastinal, hilar, and retroperitoneal lymph nodes, effusion with positive cytology    - stopped chemo with plans to transition to immunomodulator.     - Dr Jean Baptiste aware    -  Follows with Palliative Care at [facility] (will likely need to rescheduled upcoming appointment on 2/12)    - I have offered to have Pall Care see them here (offered today and yesterday). They indicate they do not want them consulted and will follow-up as an outpatient    Today patient id doing better. He had some nausea but it improved with compazine and he ate some breakfast. He denies and chest pain, sob. He is ambulating in the halls without dizziness today. He feels ready to discharge  home.    Consultations This Hospital Stay   SOCIAL WORK IP CONSULT    Code Status   Full Code    Time Spent on this Encounter   I, Avila Rodrigues MD, personally saw the patient today and spent greater than 30 minutes discharging this patient.       Avila Rodrigues MD  [facility]  ______________________________________________________________________    Physical Exam   Vital Signs: Temp: 97.8  F (36.6  C) Temp src: Oral BP: (!) 164/89(sitting after a walk)   Heart Rate: 76 Resp: 16 SpO2: 92 % O2 Device: None (Room air)    Weight: 152 lbs 8 oz  Constitutional: awake, alert, cooperative, no apparent distress, and appears stated age  Eyes: Lids and lashes normal, pupils equal, round and reactive to light, extra ocular muscles intact, sclera clear, conjunctiva normal  ENT: Normocephalic, without obvious abnormality, atraumatic, sinuses nontender on palpation, external ears without lesions, oral pharynx with moist mucous membranes, tonsils without erythema or exudates, gums normal and good dentition.  Respiratory: No increased work of breathing, good air exchange, clear to auscultation bilaterally, no crackles or wheezing  Cardiovascular: Normal apical impulse, regular rate and rhythm, normal S1 and S2, no S3 or S4, and no murmur noted  GI: No scars, normal bowel sounds, soft, non-distended, non-tender, no masses palpated, no hepatosplenomegally  Skin: no bruising or bleeding       Primary Care Physician   [facility]    Discharge Orders      Home care nursing referral      Home Care PT Referral for Hospital Discharge      Activity    Your activity upon discharge: activity as tolerated     Monitor and record    blood glucose every morning  blood pressure daily     Follow-up and recommended labs and tests     Follow up with primary care provider, [facility], within 7 days for hospital follow- up.  The following labs/tests are recommended: follow-up on orthostatic  hypotension.     Reason for your hospital stay    Syncope (passing out) due to orthostatic hypotension (low blood pressure with standing). Colitis (gastrointestinal infection)     MD face to face encounter    Documentation of Face to Face and Certification for Home Health Services    I certify that patient: Ramon Winter is under my care and that I, or a nurse practitioner or physician's assistant working with me, had a face-to-face encounter that meets the physician face-to-face encounter requirements with this patient on: 2/12/2020.    This encounter with the patient was in whole, or in part, for the following medical condition, which is the primary reason for home health care: weakness, esophageal cancer.    I certify that, based on my findings, the following services are medically necessary home health services: Nursing and Physical Therapy.    My clinical findings support the need for the above services because: Nurse is needed: To assess vitals after changes in medications or other medical regimen.. and Physical Therapy Services are needed to assess and treat the following functional impairments: weakness, poor appetite.    Further, I certify that my clinical findings support that this patient is homebound (i.e. absences from home require considerable and taxing effort and are for medical reasons or [employer] services or infrequently or of short duration when for other reasons) because: Requires assistance of another person or specialized equipment to access medical services because patient: Requires supervision of another for safe transfer...    Based on the above findings. I certify that this patient is confined to the home and needs intermittent skilled nursing care, physical therapy and/or speech therapy.  The patient is under my care, and I have initiated the establishment of the plan of care.  This patient will be followed by a physician who will periodically review the plan of  care.  Physician/Provider to provide follow up care: [facility] physician (the Medicare certified [place] provider): Avila Rodrigues MD  Physician Signature: See electronic signature associated with these discharge orders.  Date: 2/12/2020     Full Code     Diet    Follow this diet upon discharge: Orders Placed This Encounter      Combination Diet Regular Diet Adult  Encourage increased fluid intake to maintain hydration       Significant Results and Procedures   Most Recent 3 CBC's:  Recent Labs   Lab Test 02/12/20  0550 02/11/20  0535 02/10/20  0600   WBC 7.4 7.9 8.4   HGB 9.5* 10.1* 10.0*   * 104* 104*    205 197     Most Recent 3 BMP's:  Recent Labs   Lab Test 02/12/20  0550 02/11/20  0535 02/10/20  0600    139 138   POTASSIUM 3.4 3.4 3.6   CHLORIDE 110* 109 108   CO2 25 26 26   BUN 11 14 16   CR 1.26* 1.26* 1.37*   ANIONGAP 6 4 4   ELIANA 8.1* 8.2* 8.0*   GLC 90 92 97     Most Recent 2 LFT's:  Recent Labs   Lab Test 02/07/20  0520 06/24/18  1642   AST 12 19   ALT 13 31   ALKPHOS 82 109   BILITOTAL 0.5 0.3   ,   Results for orders placed or performed during the hospital encounter of 02/05/20   XR Chest 2 Views    Narrative    CHEST TWO VIEWS 2/5/2020 3:08 PM     HISTORY: Chest pain.    COMPARISON: 1/28/2020    FINDINGS: Right-sided Port-A-Cath with the tip in the SVC. Heart size  is normal. No pneumothorax. There are small bilateral pleural  effusions, decreased in size compared to 1/28/2020. Minor hazy opacity  at the right lung base, likely atelectasis. Heart size normal.      Impression    IMPRESSION: Small bilateral pleural effusions with minor atelectasis  at the right lung base.    SKYE CORONADO MD   CT Chest Abdomen Pelvis w/o Contrast    Narrative    CT CHEST, ABDOMEN AND PELVIS WITHOUT CONTRAST   2/6/2020 10:57 AM     HISTORY: Fever. Presyncope. Weakness. Recent chemotherapy. History of  esophageal cancer.    TECHNIQUE: Volumetric helical  sections were acquired from the thoracic  inlet to the ischial tuberosities without IV contrast. Coronal images  were also reconstructed. Radiation dose for this scan was reduced  using automated exposure control, adjustment of the mA and/or kV  according to patient size, or iterative reconstruction technique.    COMPARISON: PET/CT performed 1/28/2020.    FINDINGS:    Chest: Small-to-moderate bilateral pleural effusions are not  significantly changed. Mild patchy associated infiltrate or  atelectasis in both lower lungs posteriorly as well as within the left  upper lobe laterally, also unchanged. Hazy nodular opacities in both  lungs are also unchanged. Postoperative changes of esophagectomy and  gastric pull-through are again noted. Right Port-A-Cath, with tip in  the low SVC. Atherosclerotic calcification of the thoracic aorta and  coronary arteries. Mild mediastinal and bilateral hilar adenopathy is  not significantly changed. For example, an enlarged lymph node in the  left paratracheal region (series 5 image 45) measures 2.4 x 1.5 cm,  and is not significantly changed. No pericardial effusion. No  pneumothorax.     Abdomen and Pelvis: Indeterminate 1.6 cm right adrenal nodule is  unchanged. The liver, gallbladder, spleen, adrenal glands, pancreas,  and kidneys have otherwise unremarkable noncontrast appearances. No  hydronephrosis. Mild atherosclerotic aortoiliac calcification.  Moderate diffuse bowel wall thickening involving the cecum and  ascending colon is new since the previous exam, and likely represents  an infectious or inflammatory colitis. No bowel obstruction. Trace  amount of nonspecific free fluid in the pelvis. There is marked  prostatic enlargement with mild central prostatic calcification. Mild  retroperitoneal adenopathy is not significantly changed. For example,  a borderline-enlarged right para-aortic lymph node (series 5 image  175) measures 1.8 x 1 cm. Degenerative changes are noted  throughout  the thoracolumbar spine.      Impression    IMPRESSION:   1. Moderate diffuse bowel wall thickening involving the cecum and  ascending colon likely represents an infectious or inflammatory  colitis. Neutropenic colitis could have this appearance.  2. Small-to-moderate bilateral pleural effusions and patchy associated  infiltrate and/or atelectasis in both lungs are unchanged.  3. Mild mediastinal, bilateral hilar, and retroperitoneal adenopathy,  as well as scattered hazy nodular opacities in both lungs, are not  significantly changed, and remain suspicious for metastatic  involvement.  4. Indeterminate right adrenal nodule is unchanged, and is also  suspicious for metastatic disease.  5. Marked prostatic enlargement.    WILLIE SUTTON MD   CT Head w/o Contrast    Narrative    CT SCAN OF THE HEAD WITHOUT CONTRAST   2/6/2020 10:55 AM     HISTORY: Dizziness. Evaluate for brain metastasis.    TECHNIQUE:  Axial images of the head and coronal reformations without  IV contrast material. Radiation dose for this scan was reduced using  automated exposure control, adjustment of the mA and/or kV according  to patient size, or iterative reconstruction technique.    COMPARISON: None.    FINDINGS: There is no evidence of intracranial hemorrhage, mass,  extended signs of acute infarct or anomaly. No definite vasogenic  edema. No mass effect or shift/herniation. The ventricles are normal  in size, shape and configuration. Mild diffuse parenchymal volume  loss. Mild scattered patchy periventricular white matter hypodensities  which are nonspecific, but likely related to chronic microvascular  ischemic disease. Scattered vascular calcifications.    Bilateral lens replacements. Orbits otherwise normal. The visualized  paranasal sinuses are free of significant disease. Trace layering  fluid in the right mastoid tip. There is also probable trace left  mastoid tip opacification. The left mastoid is somewhat  relatively  under-pneumatized as compared to the right. The middle ear cavities  are clear. The bony calvarium and bones of the skull base appear  intact.       Impression    IMPRESSION:     1. No evidence of acute intracranial hemorrhage, mass, or herniation.  2. Diffuse parenchymal volume loss and white matter changes likely due  to chronic microvascular ischemic disease.   3. Bilateral mastoid tip fluid.    If there is persistent clinical concern for intracranial metastatic  disease, MRI brain without and with contrast would be more sensitive  for the detection of small intracranial metastases.    KEANU SIMEON MD       Discharge Medications   Current Discharge Medication List      START taking these medications    Details   ciprofloxacin (CIPRO) 500 MG tablet Take 1 tablet (500 mg) by mouth every 12 hours for 3 doses  Qty: 3 tablet, Refills: 0    Associated Diagnoses: Colitis      metroNIDAZOLE (FLAGYL) 250 MG tablet Take 1 tablet (250 mg) by mouth 3 times daily for 5 days  Qty: 15 tablet, Refills: 0    Associated Diagnoses: Colitis      !! venlafaxine (EFFEXOR) 25 MG tablet Take 1 tablet (25 mg) by mouth 3 times daily for 3 days, THEN 1 tablet (25 mg) 3 times daily for 3 days, THEN 1 tablet (25 mg) daily for 3 days.  Qty: 21 tablet, Refills: 0    Associated Diagnoses: Depression, unspecified depression type       !! - Potential duplicate medications found. Please discuss with provider.      CONTINUE these medications which have CHANGED    Details   !! prochlorperazine (COMPAZINE) 5 MG tablet Take 1 tablet (5 mg) by mouth 3 times daily as needed for nausea or vomiting  Qty: 60 tablet, Refills: 0    Associated Diagnoses: Nausea      !! prochlorperazine (COMPAZINE) 5 MG tablet Take 1 tablet (5 mg) by mouth 3 times daily      !! venlafaxine (EFFEXOR) 37.5 MG tablet Take 1 tablet (37.5 mg) by mouth 2 times daily for 3 days tapering  Qty: 6 tablet, Refills: 0    Associated Diagnoses: Depression, unspecified  depression type       !! - Potential duplicate medications found. Please discuss with provider.      CONTINUE these medications which have NOT CHANGED    Details   gabapentin (NEURONTIN) 100 MG capsule Take 100 mg by mouth every morning      allopurinol (ZYLOPRIM) 100 MG tablet Take 100 mg by mouth daily       artificial saliva (BIOTENE MT) SOLN solution 1 application to affected mucosal area 4 to 8 times per day as needed for dry mouth. while awake.      aspirin (ASPIRIN ADULT LOW STRENGTH) 81 MG EC tablet Take 81 mg by mouth every morning      diphenoxylate-atropine (LOMOTIL) 2.5-0.025 MG tablet Take 1 tablet by mouth 4 times daily as needed for diarrhea      dronabinol (MARINOL) 10 MG capsule Take 10 mg by mouth 2 times daily      methylphenidate (RITALIN) 5 MG tablet Take 5-10 mg by mouth daily as needed (in the morning for cancer related fatigue. may repeat at noon as needed)      omeprazole (PRILOSEC) 20 MG CR capsule Take 20 mg by mouth every evening       traMADol (ULTRAM) 50 MG tablet Take 50 mg by mouth every 6 hours as needed for severe pain      !! venlafaxine (EFFEXOR) 75 MG tablet Take 75 mg by mouth 2 times daily       !! - Potential duplicate medications found. Please discuss with provider.      STOP taking these medications       atenolol (TENORMIN) 100 MG tablet Comments:   Reason for Stopping:         lisinopril (PRINIVIL/ZESTRIL) 20 MG tablet Comments:   Reason for Stopping:         LORazepam (ATIVAN) 0.5 MG tablet Comments:   Reason for Stopping:         metFORMIN (GLUCOPHAGE) 500 MG tablet Comments:   Reason for Stopping:         rosuvastatin (CRESTOR) 40 MG tablet Comments:   Reason for Stopping:             Allergies   No Known Allergies

## 2020-02-12 NOTE — PLAN OF CARE
Patient's After Visit Summary was reviewed with patient and/or Daughter and wife.   Patient verbalized understanding of After Visit Summary, recommended follow up and was given an opportunity to ask questions.   Discharge medications sent home with patient/family: No, medications sent to [facility] in [place]   Discharged with spouse and daughter via private vehicle.     OBSERVATION patient END time: 2:15 PM

## 2020-02-12 NOTE — PLAN OF CARE
Temp: 97.6  F (36.4  C) Temp src: Oral BP: 135/82   Heart Rate: 69 Resp: 16 SpO2: 97 %    Orientation: Alert and Oriented x4  Pain status: Denies   Activity:SBA, walker   Peripheral edema: None   Resp: WDL  Lungs: WDL  Cardiac: WDL  GI: WDL  : WDL  Skin: WDL   LDA: Port de-accessed.   Diet: Regular   Consults: None   Plan of care in Hospital: Orthostatics BP improved. Denies dizziness. Walked in halls, tolerated well.   Discharge: home with wife.

## 2020-02-12 NOTE — PLAN OF CARE
Temp: 97.8  F (36.6  C) Temp src: Oral BP: 114/78   Heart Rate: 76 Resp: 16 SpO2: 92 % O2 Device: None (Room air)      Admit Date: 02/05/2020    Admitting Diagnosis: Syncope    Neuro: Alert and oriented x 4    Respiratory: WDL    Cardio: WDL    GI: WDL    : WDL    Skin: WDL    IV: LR @ 100 mL/hr    Activity: Assist x 1 with walker    Diet: Regular    Pain: Denies    Plan: Control orthostatic BP and discharge home when safe    Patient has abdominal binder and ALISSA stockings on. Complains of dizziness upon standing. Vital signs stable. Labs to be rechecked this morning.  Port to chest; IV infusing fluids.

## 2020-02-20 ENCOUNTER — HOSPITAL ENCOUNTER (OUTPATIENT)
Dept: CARDIOLOGY | Facility: CLINIC | Age: 75
Discharge: HOME | End: 2020-02-20
Attending: INTERNAL MEDICINE | Admitting: INTERNAL MEDICINE
Payer: COMMERCIAL

## 2020-02-20 DIAGNOSIS — I42.9 SECONDARY CARDIOMYOPATHY (H): ICD-10-CM

## 2020-02-20 DIAGNOSIS — I10 BENIGN ESSENTIAL HYPERTENSION: ICD-10-CM

## 2020-02-20 PROCEDURE — 93325 DOPPLER ECHO COLOR FLOW MAPG: CPT | Mod: 26 | Performed by: INTERNAL MEDICINE

## 2020-02-20 PROCEDURE — 93321 DOPPLER ECHO F-UP/LMTD STD: CPT | Mod: 26 | Performed by: INTERNAL MEDICINE

## 2020-02-20 PROCEDURE — 25500064 ZZH RX 255 OP 636: Performed by: INTERNAL MEDICINE

## 2020-02-20 PROCEDURE — 93308 TTE F-UP OR LMTD: CPT | Mod: 26 | Performed by: INTERNAL MEDICINE

## 2020-02-20 RX ADMIN — HUMAN ALBUMIN MICROSPHERES AND PERFLUTREN 3 ML: 10; .22 INJECTION, SOLUTION INTRAVENOUS at 15:15

## 2020-02-26 ENCOUNTER — MEDICAL CORRESPONDENCE (OUTPATIENT)
Dept: HEALTH INFORMATION MANAGEMENT | Facility: CLINIC | Age: 75
End: 2020-02-26

## 2020-02-26 ENCOUNTER — HOSPITAL ENCOUNTER (OUTPATIENT)
Dept: CT IMAGING | Facility: CLINIC | Age: 75
Discharge: HOME | End: 2020-02-26
Attending: NURSE PRACTITIONER | Admitting: NURSE PRACTITIONER
Payer: COMMERCIAL

## 2020-02-26 ENCOUNTER — TELEPHONE (OUTPATIENT)
Dept: CARDIOLOGY | Facility: CLINIC | Age: 75
End: 2020-02-26

## 2020-02-26 DIAGNOSIS — I26.99 PULMONARY EMBOLI (H): ICD-10-CM

## 2020-02-26 LAB
CREAT BLD-MCNC: 1.3 MG/DL (ref 0.66–1.25)
GFR SERPL CREATININE-BSD FRML MDRD: 54 ML/MIN/{1.73_M2}

## 2020-02-26 PROCEDURE — 40000863 ZZH STATISTIC RADIOLOGY XRAY, US, CT, MAR, NM

## 2020-02-26 PROCEDURE — 25000125 ZZHC RX 250: Performed by: NURSE PRACTITIONER

## 2020-02-26 PROCEDURE — 82565 ASSAY OF CREATININE: CPT

## 2020-02-26 PROCEDURE — 25000128 H RX IP 250 OP 636: Performed by: NURSE PRACTITIONER

## 2020-02-26 PROCEDURE — 71275 CT ANGIOGRAPHY CHEST: CPT

## 2020-02-26 PROCEDURE — 25000128 H RX IP 250 OP 636: Performed by: PHYSICIAN ASSISTANT

## 2020-02-26 RX ORDER — HEPARIN SODIUM (PORCINE) LOCK FLUSH IV SOLN 100 UNIT/ML 100 UNIT/ML
5 SOLUTION INTRAVENOUS
Status: DISCONTINUED | OUTPATIENT
Start: 2020-02-26 | End: 2020-02-27 | Stop reason: HOSPADM

## 2020-02-26 RX ORDER — HEPARIN SODIUM,PORCINE 10 UNIT/ML
5-10 VIAL (ML) INTRAVENOUS
Status: DISCONTINUED | OUTPATIENT
Start: 2020-02-26 | End: 2020-02-27 | Stop reason: HOSPADM

## 2020-02-26 RX ORDER — IOPAMIDOL 755 MG/ML
60 INJECTION, SOLUTION INTRAVASCULAR ONCE
Status: COMPLETED | OUTPATIENT
Start: 2020-02-26 | End: 2020-02-26

## 2020-02-26 RX ORDER — HEPARIN SODIUM,PORCINE 10 UNIT/ML
5-10 VIAL (ML) INTRAVENOUS EVERY 24 HOURS
Status: DISCONTINUED | OUTPATIENT
Start: 2020-02-26 | End: 2020-02-27 | Stop reason: HOSPADM

## 2020-02-26 RX ADMIN — IOPAMIDOL 60 ML: 755 INJECTION, SOLUTION INTRAVENOUS at 17:27

## 2020-02-26 RX ADMIN — HEPARIN SODIUM (PORCINE) LOCK FLUSH IV SOLN 100 UNIT/ML 5 ML: 100 SOLUTION at 17:53

## 2020-02-26 RX ADMIN — SODIUM CHLORIDE 100 ML: 9 INJECTION, SOLUTION INTRAVENOUS at 17:27

## 2020-02-26 NOTE — TELEPHONE ENCOUNTER
Notes recorded by Nilesh Valentino MD on 2/26/2020 at 12:26 PM CST  LV function is normal. The last echo was probably underestimated.    Spoke with patient's wife regarding echocardiogram results and Dr. Valentino's comments. Patient's wife verbalized understanding and agreed with plan of care, and will pass along the results to the patient. Patient's wife also requested that his echocardiogram results be sent to [facility]. Results faxed.

## 2020-02-28 ENCOUNTER — HOSPITAL ENCOUNTER (OUTPATIENT)
Dept: ULTRASOUND IMAGING | Facility: CLINIC | Age: 75
Discharge: HOME | End: 2020-02-28
Attending: INTERNAL MEDICINE | Admitting: INTERNAL MEDICINE
Payer: COMMERCIAL

## 2020-02-28 ENCOUNTER — HOSPITAL ENCOUNTER (OUTPATIENT)
Dept: GENERAL RADIOLOGY | Facility: CLINIC | Age: 75
End: 2020-02-28
Attending: RADIOLOGY
Payer: COMMERCIAL

## 2020-02-28 VITALS
OXYGEN SATURATION: 96 % | RESPIRATION RATE: 18 BRPM | SYSTOLIC BLOOD PRESSURE: 103 MMHG | DIASTOLIC BLOOD PRESSURE: 81 MMHG

## 2020-02-28 DIAGNOSIS — C16.0: ICD-10-CM

## 2020-02-28 DIAGNOSIS — J90 BILATERAL PLEURAL EFFUSION: ICD-10-CM

## 2020-02-28 PROCEDURE — 32555 ASPIRATE PLEURA W/ IMAGING: CPT

## 2020-02-28 PROCEDURE — 25000125 ZZHC RX 250: Performed by: RADIOLOGY

## 2020-02-28 PROCEDURE — 40000986 XR CHEST 1 VW

## 2020-02-28 RX ORDER — NICOTINE POLACRILEX 4 MG
15-30 LOZENGE BUCCAL
Status: CANCELLED | OUTPATIENT
Start: 2020-02-28

## 2020-02-28 RX ORDER — DEXTROSE MONOHYDRATE 25 G/50ML
25-50 INJECTION, SOLUTION INTRAVENOUS
Status: CANCELLED | OUTPATIENT
Start: 2020-02-28

## 2020-02-28 RX ORDER — LIDOCAINE HYDROCHLORIDE 10 MG/ML
10 INJECTION, SOLUTION EPIDURAL; INFILTRATION; INTRACAUDAL; PERINEURAL ONCE
Status: COMPLETED | OUTPATIENT
Start: 2020-02-28 | End: 2020-02-28

## 2020-02-28 RX ADMIN — LIDOCAINE HYDROCHLORIDE 20 ML: 10 INJECTION, SOLUTION EPIDURAL; INFILTRATION; INTRACAUDAL; PERINEURAL at 12:05

## 2020-02-28 NOTE — PROGRESS NOTES
Bilateral Thoracentesis consent and procedure completed by Dr. Morales.  Tolerated well.  No complications.  VSS.  1,500 mLs drained from Left yellow color. 1,100 mLs drained from Right pleural yellow color.  Sites WDL.  Bandaids applied.  No specimens collected.  Post CXR complete no pneumothorax.  Reviewed discharge instructions and questions answered.   Discharge via wc with spouse.

## (undated) DEVICE — PACK MAJOR SBA15MAFSI

## (undated) DEVICE — SU SILK 3-0 TIE 24" SA74H

## (undated) DEVICE — DRSG KERLIX 4 1/2"X4YDS ROLL 6715

## (undated) DEVICE — GOWN IMPERVIOUS ZONED LG

## (undated) DEVICE — SU SILK 2-0 SH 30" K833H

## (undated) DEVICE — BLADE KNIFE SURG 10 371110

## (undated) DEVICE — ENDO NDL BALL TIP ULTRASOUND 25GA ECHO-25

## (undated) DEVICE — LINEN TOWEL PACK X5 5464

## (undated) DEVICE — GOWN LG DISP 9515

## (undated) DEVICE — ENDO FORCEP ENDOJAW BIOPSY 2.8MMX160CM FB-220K

## (undated) DEVICE — CLIP HORIZON LG ORANGE 004200

## (undated) DEVICE — SU PROLENE 2-0 V-7 36" 8977H

## (undated) DEVICE — TAPE DRSG UNIVERSAL CLOTH 3" WHITE LATEX 881-3

## (undated) DEVICE — SPONGE RAY-TEC 4X8" 7318

## (undated) DEVICE — GLOVE PROTEXIS MICRO 7.0  2D73PM70

## (undated) DEVICE — BLADE KNIFE SURG 15 371115

## (undated) DEVICE — EYE RING MALYUGIN PUPIL EXPANDER 6.25MM MAL-000-1

## (undated) DEVICE — SU VICRYL 0 CTX CR 8X18" J764D

## (undated) DEVICE — BAG CLEAR TRASH 1.3M 39X33" P4040C

## (undated) DEVICE — TUBING SUCTION MEDI-VAC SOFT 3/16"X20' N520A

## (undated) DEVICE — LIGHT HANDLE X2

## (undated) DEVICE — GLOVE PROTEXIS W/NEU-THERA 6.5  2D73TE65

## (undated) DEVICE — NDL 22GA 1.5"

## (undated) DEVICE — SU SILK 2 REEL 60" SA8H

## (undated) DEVICE — DRAPE POUCH INSTRUMENT 1018

## (undated) DEVICE — DRSG STERI STRIP 1/2X4" R1547

## (undated) DEVICE — SU NDL CUT REV MED 3/8 209014

## (undated) DEVICE — POSITIONER ASSIST ESSTECH 3S T401210S

## (undated) DEVICE — EYE SOL BSS 500ML

## (undated) DEVICE — DRAIN PENROSE 0.75"X18" LATEX FREE GR205

## (undated) DEVICE — GOWN XLG DISP 9545

## (undated) DEVICE — EYE KNIFE SLIT XSTAR VISITEC 2.6MM 45DEG 373726

## (undated) DEVICE — CLIP APPLIER 11.5" PREMIUM II 134053

## (undated) DEVICE — DRAIN PENROSE 0.50"X18" LATEX FREE GR203

## (undated) DEVICE — SU SILK 0 24" TIE SA76G

## (undated) DEVICE — CONNECTOR BLAKE DRAIN SGL BCC1

## (undated) DEVICE — Device

## (undated) DEVICE — ESU PENCIL W/HOLSTER E2350H

## (undated) DEVICE — SU VICRYL 1 CT 36" J959H

## (undated) DEVICE — CATH ON-Q PAIN SILVER SKR 2.5" PM010-A

## (undated) DEVICE — TUBE JEJUNOSTOMY 12FR  0200-12LV

## (undated) DEVICE — SOL WATER IRRIG 1000ML BOTTLE 2F7114

## (undated) DEVICE — ESU ELEC BLADE 6" COATED E1450-6

## (undated) DEVICE — SU PROLENE 3-0 V-7DA 36" 8976H

## (undated) DEVICE — SUCTION CANISTER MEDIVAC LINER 3000ML W/LID 65651-530

## (undated) DEVICE — SU SILK 3-0 SH CR 8X30" C017D

## (undated) DEVICE — EYE SHIELD PLASTIC

## (undated) DEVICE — SU ETHILON 2-0 FS 18" 664H

## (undated) DEVICE — EYE KNIFE VISITEC 1.1MM ANG 45DEG SIDEPORT 373710

## (undated) DEVICE — PREP PAD ALCOHOL 6818

## (undated) DEVICE — PACK CATARACT CUSTOM SO DALE SEY32CTFCX

## (undated) DEVICE — EYE PACK BVI READYPAK KIT #1

## (undated) DEVICE — KIT ENDO TURNOVER/PROCEDURE W/CLEAN A SCOPE LINERS 103888

## (undated) DEVICE — DRSG GAUZE 4X4" 3033

## (undated) DEVICE — ENDO PROBE COVER ULTRASOUND BALLOON LATEX  MAJ-249

## (undated) DEVICE — DRAPE BREAST/CHEST 29420

## (undated) DEVICE — SU VICRYL 2-0 CT-1 27" J339H

## (undated) DEVICE — SYR 50ML SLIP TIP W/O NDL 309654

## (undated) DEVICE — TUBING SUCTION 12"X1/4" N612

## (undated) DEVICE — GLOVE PROTEXIS W/NEU-THERA 7.5  2D73TE75

## (undated) DEVICE — SYR 05ML LL W/O NDL

## (undated) DEVICE — PREP CHLORAPREP 26ML TINTED ORANGE  260815

## (undated) DEVICE — STPL CIRCULAR DST 25MM W/TILT TIP EEA25

## (undated) DEVICE — DRAPE IOBAN INCISE 23X17" 6650EZ

## (undated) DEVICE — APPLICATOR COTTON TIP 6"X2 STERILE LF 6012

## (undated) DEVICE — TAPE SILICONE KIND REMOVAL 2" 2770S-2

## (undated) DEVICE — STPL RELOAD REG/THK TISSUE ECHELON 60 X 3.8MM GOLD GST60D

## (undated) DEVICE — SU PROLENE 4-0 V-7DA 36" 8975H

## (undated) DEVICE — ESU HOLDER LAP INST DISP PURPLE LONG 330MM H-PRO-330

## (undated) DEVICE — SOL NACL 0.9% IRRIG 1000ML BOTTLE 07138-09

## (undated) DEVICE — COVER TABLE POLY 65X90" 8186

## (undated) DEVICE — STPL ENDO ARTICULATING 60MM EC60A

## (undated) DEVICE — GLOVE PROTEXIS MICRO 8.0  2D73PM80

## (undated) DEVICE — STPL SKIN SUBCUTICULAR INSORB  2030

## (undated) DEVICE — EYE PACK CUSTOM ANTERIOR 30DEG TIP CENTURION PPK6682-04

## (undated) DEVICE — SU SILK 3-0 SH CR 8X18" C013D

## (undated) DEVICE — ESU GROUND PAD UNIVERSAL W/O CORD

## (undated) DEVICE — SU SILK 2-0 TIE 24" SA75H

## (undated) DEVICE — SU PDS II 0 CTX 60" Z990G

## (undated) RX ORDER — BUPIVACAINE HYDROCHLORIDE 5 MG/ML
INJECTION, SOLUTION EPIDURAL; INTRACAUDAL
Status: DISPENSED
Start: 2017-11-07

## (undated) RX ORDER — NEOSTIGMINE METHYLSULFATE 1 MG/ML
VIAL (ML) INJECTION
Status: DISPENSED
Start: 2018-10-22

## (undated) RX ORDER — DEXAMETHASONE SODIUM PHOSPHATE 4 MG/ML
INJECTION, SOLUTION INTRA-ARTICULAR; INTRALESIONAL; INTRAMUSCULAR; INTRAVENOUS; SOFT TISSUE
Status: DISPENSED
Start: 2018-10-22

## (undated) RX ORDER — PROPOFOL 10 MG/ML
INJECTION, EMULSION INTRAVENOUS
Status: DISPENSED
Start: 2018-10-22

## (undated) RX ORDER — EPHEDRINE SULFATE 50 MG/ML
INJECTION, SOLUTION INTRAMUSCULAR; INTRAVENOUS; SUBCUTANEOUS
Status: DISPENSED
Start: 2018-10-22

## (undated) RX ORDER — PHENYLEPHRINE HCL IN 0.9% NACL 1 MG/10 ML
SYRINGE (ML) INTRAVENOUS
Status: DISPENSED
Start: 2018-10-22

## (undated) RX ORDER — ONDANSETRON 2 MG/ML
INJECTION INTRAMUSCULAR; INTRAVENOUS
Status: DISPENSED
Start: 2018-10-22

## (undated) RX ORDER — GLYCOPYRROLATE 0.2 MG/ML
INJECTION INTRAMUSCULAR; INTRAVENOUS
Status: DISPENSED
Start: 2018-10-22

## (undated) RX ORDER — FENTANYL CITRATE 50 UG/ML
INJECTION, SOLUTION INTRAMUSCULAR; INTRAVENOUS
Status: DISPENSED
Start: 2017-11-07

## (undated) RX ORDER — HYDROMORPHONE HYDROCHLORIDE 1 MG/ML
INJECTION, SOLUTION INTRAMUSCULAR; INTRAVENOUS; SUBCUTANEOUS
Status: DISPENSED
Start: 2017-11-07

## (undated) RX ORDER — CEFAZOLIN SODIUM 2 G/100ML
INJECTION, SOLUTION INTRAVENOUS
Status: DISPENSED
Start: 2017-11-07

## (undated) RX ORDER — FENTANYL CITRATE 50 UG/ML
INJECTION, SOLUTION INTRAMUSCULAR; INTRAVENOUS
Status: DISPENSED
Start: 2017-08-17

## (undated) RX ORDER — ONDANSETRON 2 MG/ML
INJECTION INTRAMUSCULAR; INTRAVENOUS
Status: DISPENSED
Start: 2017-04-19

## (undated) RX ORDER — KETAMINE HCL IN 0.9 % NACL 50 MG/5 ML
SYRINGE (ML) INTRAVENOUS
Status: DISPENSED
Start: 2018-10-22

## (undated) RX ORDER — PROPOFOL 10 MG/ML
INJECTION, EMULSION INTRAVENOUS
Status: DISPENSED
Start: 2017-08-17

## (undated) RX ORDER — LIDOCAINE HYDROCHLORIDE 10 MG/ML
INJECTION, SOLUTION EPIDURAL; INFILTRATION; INTRACAUDAL; PERINEURAL
Status: DISPENSED
Start: 2017-04-19

## (undated) RX ORDER — MOXIFLOXACIN 5 MG/ML
SOLUTION/ DROPS OPHTHALMIC
Status: DISPENSED
Start: 2017-04-19

## (undated) RX ORDER — FENTANYL CITRATE 50 UG/ML
INJECTION, SOLUTION INTRAMUSCULAR; INTRAVENOUS
Status: DISPENSED
Start: 2018-10-22

## (undated) RX ORDER — FENTANYL CITRATE 50 UG/ML
INJECTION, SOLUTION INTRAMUSCULAR; INTRAVENOUS
Status: DISPENSED
Start: 2017-08-02